# Patient Record
Sex: FEMALE | Race: WHITE | NOT HISPANIC OR LATINO | Employment: OTHER | URBAN - METROPOLITAN AREA
[De-identification: names, ages, dates, MRNs, and addresses within clinical notes are randomized per-mention and may not be internally consistent; named-entity substitution may affect disease eponyms.]

---

## 2018-08-12 PROBLEM — I10 HTN (HYPERTENSION): Status: ACTIVE | Noted: 2018-08-12

## 2018-08-12 PROBLEM — R55 SYNCOPE: Status: ACTIVE | Noted: 2018-08-12

## 2018-08-12 PROBLEM — E78.5 HYPERLIPIDEMIA: Status: ACTIVE | Noted: 2018-08-12

## 2019-04-05 ENCOUNTER — OFFICE VISIT (OUTPATIENT)
Dept: INTERNAL MEDICINE | Facility: CLINIC | Age: 84
End: 2019-04-05
Payer: MEDICARE

## 2019-04-05 VITALS
HEIGHT: 60 IN | RESPIRATION RATE: 16 BRPM | HEART RATE: 58 BPM | BODY MASS INDEX: 23.85 KG/M2 | SYSTOLIC BLOOD PRESSURE: 100 MMHG | DIASTOLIC BLOOD PRESSURE: 70 MMHG | WEIGHT: 121.5 LBS

## 2019-04-05 DIAGNOSIS — Z23 NEED FOR VACCINATION: ICD-10-CM

## 2019-04-05 DIAGNOSIS — F32.0 CURRENT MILD EPISODE OF MAJOR DEPRESSIVE DISORDER WITHOUT PRIOR EPISODE: ICD-10-CM

## 2019-04-05 DIAGNOSIS — E78.5 HYPERLIPIDEMIA, UNSPECIFIED HYPERLIPIDEMIA TYPE: ICD-10-CM

## 2019-04-05 DIAGNOSIS — I10 ESSENTIAL HYPERTENSION: Primary | ICD-10-CM

## 2019-04-05 DIAGNOSIS — F41.9 ANXIETY: ICD-10-CM

## 2019-04-05 DIAGNOSIS — I25.10 CORONARY ARTERY DISEASE INVOLVING NATIVE CORONARY ARTERY OF NATIVE HEART WITHOUT ANGINA PECTORIS: ICD-10-CM

## 2019-04-05 PROBLEM — R55 SYNCOPE: Status: RESOLVED | Noted: 2018-08-12 | Resolved: 2019-04-05

## 2019-04-05 PROCEDURE — 99213 OFFICE O/P EST LOW 20 MIN: CPT | Mod: PBBFAC | Performed by: INTERNAL MEDICINE

## 2019-04-05 PROCEDURE — 90714 TD VACC NO PRESV 7 YRS+ IM: CPT | Mod: PBBFAC

## 2019-04-05 PROCEDURE — 99999 TD VACCINE GREATER THAN OR EQUAL TO 7YO PRESERVATIVE FREE IM: CPT | Mod: PBBFAC,,,

## 2019-04-05 PROCEDURE — 99999 PR STA SHADOW: CPT | Mod: PBBFAC,,, | Performed by: INTERNAL MEDICINE

## 2019-04-05 PROCEDURE — 99999 PR PBB SHADOW E&M-EST. PATIENT-LVL III: ICD-10-PCS | Mod: PBBFAC,,, | Performed by: INTERNAL MEDICINE

## 2019-04-05 PROCEDURE — 99999 PNEUMOCOCCAL CONJUGATE VACCINE 13-VALENT LESS THAN 5YO & GREATER THAN: ICD-10-PCS | Mod: PBBFAC,,,

## 2019-04-05 PROCEDURE — G0009 ADMIN PNEUMOCOCCAL VACCINE: HCPCS | Mod: PBBFAC

## 2019-04-05 PROCEDURE — 99203 OFFICE O/P NEW LOW 30 MIN: CPT | Mod: S$PBB | Performed by: INTERNAL MEDICINE

## 2019-04-05 PROCEDURE — 99999 PNEUMOCOCCAL CONJUGATE VACCINE 13-VALENT LESS THAN 5YO & GREATER THAN: CPT | Mod: PBBFAC,,,

## 2019-04-05 PROCEDURE — 99999 PR PBB SHADOW E&M-EST. PATIENT-LVL III: CPT | Mod: PBBFAC,,, | Performed by: INTERNAL MEDICINE

## 2019-04-05 RX ORDER — FLUOXETINE 10 MG/1
10 CAPSULE ORAL DAILY
Qty: 30 CAPSULE | Refills: 11 | Status: SHIPPED | OUTPATIENT
Start: 2019-04-05 | End: 2019-05-06

## 2019-04-05 RX ORDER — LISINOPRIL AND HYDROCHLOROTHIAZIDE 10; 12.5 MG/1; MG/1
1 TABLET ORAL EVERY MORNING
Refills: 2 | COMMUNITY
Start: 2019-03-08 | End: 2023-05-16

## 2019-04-05 NOTE — PROGRESS NOTES
"`Subjective:       Patient ID: Alexandra Gleason is a 84 y.o. female.    Chief Complaint: Anxiety and Establish Care      HPI:  Patient is new to clinic and presents to establish care. She has CAD, HTN, HLD.     HTN: on lisinopril-HCTZ. On for years. Does not check BP At home. Denies chest pains, SOB, LE edema.     CAD: follows with Dr. Bhakta. No stents or h/o MI, found on angiogram. Denies angian sx. On statin. HR 58 today--not on BB.    HLD: on atorvastatin 20mg. Denies medication side effects. Does blood work with Dr. Bhakta every 6 months--due in August.     Today she reports issues with anxiety. She feels nervous. + crying spells " for now reason". Has had sx for several months, maybe years. Dealing with her sick  (dementia) and caring for him. Has never been on medicine for her mood. Does not sleep well, sleeps may 4 hours a night. Difficulty getting to sleep. Granddaughter in on Prozac and interested in trying this.       Past Medical History:   Diagnosis Date    High cholesterol     Hypertension        History reviewed. No pertinent family history.    Social History     Socioeconomic History    Marital status:      Spouse name: Not on file    Number of children: Not on file    Years of education: Not on file    Highest education level: Not on file   Occupational History    Not on file   Social Needs    Financial resource strain: Not on file    Food insecurity:     Worry: Not on file     Inability: Not on file    Transportation needs:     Medical: Not on file     Non-medical: Not on file   Tobacco Use    Smoking status: Never Smoker   Substance and Sexual Activity    Alcohol use: No    Drug use: No    Sexual activity: Not on file   Lifestyle    Physical activity:     Days per week: Not on file     Minutes per session: Not on file    Stress: Not on file   Relationships    Social connections:     Talks on phone: Not on file     Gets together: Not on file     Attends Christian service: " Not on file     Active member of club or organization: Not on file     Attends meetings of clubs or organizations: Not on file     Relationship status: Not on file   Other Topics Concern    Not on file   Social History Narrative    Not on file       Review of Systems   Constitutional: Negative for activity change, fatigue, fever and unexpected weight change.   HENT: Negative for congestion, ear pain, hearing loss, rhinorrhea and sore throat.    Eyes: Negative for redness and visual disturbance.   Respiratory: Negative for cough, shortness of breath and wheezing.    Cardiovascular: Negative for chest pain, palpitations and leg swelling.   Gastrointestinal: Negative for abdominal pain, constipation, diarrhea, nausea and vomiting.   Genitourinary: Negative for dysuria, frequency and urgency.   Musculoskeletal: Negative for back pain, joint swelling and neck pain.   Skin: Negative for color change, rash and wound.   Neurological: Negative for dizziness, tremors, weakness, light-headedness and headaches.   Psychiatric/Behavioral: Positive for dysphoric mood and sleep disturbance. The patient is nervous/anxious.          Objective:      Physical Exam   Constitutional: She is oriented to person, place, and time. She appears well-developed and well-nourished. No distress.   HENT:   Head: Normocephalic and atraumatic.   Right Ear: External ear normal.   Left Ear: External ear normal.   Eyes: Pupils are equal, round, and reactive to light. Conjunctivae and EOM are normal. Right eye exhibits no discharge. Left eye exhibits no discharge.   Neck: Neck supple. No tracheal deviation present.   Cardiovascular: Normal rate and regular rhythm.   No murmur heard.  Pulmonary/Chest: Effort normal and breath sounds normal. No respiratory distress. She has no wheezes. She has no rales.   Abdominal: Soft. Bowel sounds are normal. She exhibits no distension. There is no tenderness.   Neurological: She is alert and oriented to person,  place, and time. No cranial nerve deficit.   Skin: Skin is warm and dry.   Psychiatric: She has a normal mood and affect. Her behavior is normal.   Vitals reviewed.      Assessment:       1. Essential hypertension    2. Hyperlipidemia, unspecified hyperlipidemia type    3. Need for vaccination    4. Anxiety    5. Current mild episode of major depressive disorder without prior episode        Plan:       Alexandra was seen today for anxiety and establish care.    Diagnoses and all orders for this visit:    Essential hypertension  Chronic controlled  Continue medications at same dose  Low Na diet  Exercise, weight loss  Check BP and keep log for next visit    Hyperlipidemia, unspecified hyperlipidemia type  Chronic controlled  Cont meds same dose  Labs done with Dr. Bhakta  Will get records    Need for vaccination  -     (In Office Administered) Pneumococcal Conjugate Vaccine (13 Valent) (IM)  -     (In Office Administered) Td Vaccine - Preservative Free    Anxiety  -     FLUoxetine 10 MG capsule; Take 1 capsule (10 mg total) by mouth once daily.  New problem  Worsening over several months  Trial of Prozac (family member on this with good results)  Side effects of SSRI discussed today in detail including risk of SI. Voiced understanding  Understands may take 4-8 weeks for full effect    Current mild episode of major depressive disorder without prior episode  -     FLUoxetine 10 MG capsule; Take 1 capsule (10 mg total) by mouth once daily.  New problem  Worsening over several months  Trial of Prozac (family member on this with good results)  Side effects of SSRI discussed today in detail including risk of SI. Voiced understanding  Understands may take 4-8 weeks for full effect    Coronary artery disease involving native coronary artery of native heart without angina pectoris  Follows with Dr. Bhakta  Cont statin therapy  Not on BB--HR 58 today  Labs with Dr. Bhakta    Health Maintenance:  -CRC: over age for screening  -PAP/MMG:   Over age for screening  -Bone Density:  Discuss more next visit  -tobacco: does not smoke  -Vaccines  Flu- did not get 2018 vaccine  Zoster- update next visit  Pneumonia-  prevnar 13 done 4/2019; pneumovax due 4/2010  Tetanus- done 4/2019       RTC 4 weeks and PRN

## 2019-04-05 NOTE — PROGRESS NOTES
No record of TD or Pneumococcal 13 in LINKS. Patient denies receiving vaccines in the past. TD given right deltoid per order. Pneumococcal 13 given left deltoid per order. No reaction noted. Patient instructed to wait 15 minutes after injection administration. Patient verbalized understanding.

## 2019-04-16 ENCOUNTER — TELEPHONE (OUTPATIENT)
Dept: INTERNAL MEDICINE | Facility: CLINIC | Age: 84
End: 2019-04-16

## 2019-04-16 NOTE — TELEPHONE ENCOUNTER
It can be but also may not be. Can be from anxiousness. Stop prozac for 1 week then can try and restart

## 2019-04-16 NOTE — TELEPHONE ENCOUNTER
----- Message from Trisha Fitch sent at 2019 10:29 AM CDT -----  Contact: Ana (grand mother)  Alexandra Gleason  MRN: [id number]  : 1934  PCP: Carla Butler  Home Phone      [phone]  Work Phone      Not on file.  Mobile          [phone]    MESSAGE:   The pt has questions regarding her medication. - Prozac 10 mg 1 otero   Pt c/o shaky more than usual to body, weakness to both legs.     Phone # [phone]    [facility] [place] [address]

## 2019-04-16 NOTE — TELEPHONE ENCOUNTER
"Dr Butler pt. Pt was started on prozac 10mg on 4/5. Pt is c/o increased "shakiness" and weakened legs. Is this a side effect of prozac?  "

## 2019-04-22 ENCOUNTER — HOSPITAL ENCOUNTER (EMERGENCY)
Facility: HOSPITAL | Age: 84
Discharge: HOME | End: 2019-04-22
Attending: SURGERY
Payer: MEDICARE

## 2019-04-22 VITALS
TEMPERATURE: 98 F | HEIGHT: 60 IN | DIASTOLIC BLOOD PRESSURE: 64 MMHG | SYSTOLIC BLOOD PRESSURE: 134 MMHG | RESPIRATION RATE: 18 BRPM | HEART RATE: 61 BPM | OXYGEN SATURATION: 100 % | WEIGHT: 120 LBS | BODY MASS INDEX: 23.56 KG/M2

## 2019-04-22 DIAGNOSIS — M54.50 MIDLINE LOW BACK PAIN WITHOUT SCIATICA, UNSPECIFIED CHRONICITY: Primary | ICD-10-CM

## 2019-04-22 PROCEDURE — 96372 THER/PROPH/DIAG INJ SC/IM: CPT | Mod: 59

## 2019-04-22 PROCEDURE — 63600175 PHARM REV CODE 636 W HCPCS: Performed by: SURGERY

## 2019-04-22 PROCEDURE — 99284 EMERGENCY DEPT VISIT MOD MDM: CPT | Mod: 25

## 2019-04-22 RX ORDER — CYCLOBENZAPRINE HCL 10 MG
10 TABLET ORAL 3 TIMES DAILY PRN
Qty: 10 TABLET | Refills: 0 | Status: SHIPPED | OUTPATIENT
Start: 2019-04-22 | End: 2019-04-22 | Stop reason: SDUPTHER

## 2019-04-22 RX ORDER — HYDROCODONE BITARTRATE AND ACETAMINOPHEN 5; 325 MG/1; MG/1
1 TABLET ORAL EVERY 4 HOURS PRN
Qty: 10 TABLET | Refills: 0 | Status: SHIPPED | OUTPATIENT
Start: 2019-04-22 | End: 2019-04-22 | Stop reason: SDUPTHER

## 2019-04-22 RX ORDER — MORPHINE SULFATE 2 MG/ML
1 INJECTION, SOLUTION INTRAMUSCULAR; INTRAVENOUS
Status: COMPLETED | OUTPATIENT
Start: 2019-04-22 | End: 2019-04-22

## 2019-04-22 RX ORDER — ONDANSETRON 2 MG/ML
4 INJECTION INTRAMUSCULAR; INTRAVENOUS
Status: COMPLETED | OUTPATIENT
Start: 2019-04-22 | End: 2019-04-22

## 2019-04-22 RX ORDER — CYCLOBENZAPRINE HCL 10 MG
10 TABLET ORAL 3 TIMES DAILY PRN
Qty: 10 TABLET | Refills: 0 | Status: SHIPPED | OUTPATIENT
Start: 2019-04-22 | End: 2019-04-27

## 2019-04-22 RX ORDER — HYDROCODONE BITARTRATE AND ACETAMINOPHEN 5; 325 MG/1; MG/1
1 TABLET ORAL EVERY 4 HOURS PRN
Qty: 10 TABLET | Refills: 0 | Status: SHIPPED | OUTPATIENT
Start: 2019-04-22 | End: 2019-05-02

## 2019-04-22 RX ADMIN — ONDANSETRON 4 MG: 2 INJECTION INTRAMUSCULAR; INTRAVENOUS at 01:04

## 2019-04-22 RX ADMIN — MORPHINE SULFATE 1 MG: 2 INJECTION, SOLUTION INTRAMUSCULAR; INTRAVENOUS at 01:04

## 2019-04-22 NOTE — ED TRIAGE NOTES
Pt presents with C/O lower back pain and feeling a pop in her back when she tried to pick her  up off the floor about 1030 this morning

## 2019-04-22 NOTE — ED NOTES
The patient is awake, alert and cooperative with a calm affect, patient is aware of environment, family members at bedside. Normal respiratory effort and rate noted, no apparent distress noted, resting comfortably. No change from previous assessment. Bed in low, locked position, SR x1. Will continue to monitor.

## 2019-04-22 NOTE — ED PROVIDER NOTES
[facility]                                                 Chief Complaint  84 y.o. female with Back Pain (felt a pop in lower back)    History of Present Illness  Alexandra Gleason presents to the emergency room with low back pain  Patient has low back pain, was lifting up on her  yesterday  Patient felt a pop in her back after lifting up on her  at home  Patient states she has any pain with any walking, no leg weakness  Pt has no signs of cauda equina syndrome, denies previous trauma  Pt on exam has a normal lumbar spine without step-off or deformity    The history is provided by the patient   device was not used during this ER visit  Medical history: Hypertension and high cholesterol  Surgeries: Back surgery, cholecystectomy and hysterectomy, neck surgery  Allergies: Iodine    Review of Systems and Physical Exam      Review of Systems  -- Constitution - no fever, denies fatigue, no weakness, no chills  -- Eyes - no tearing or redness, no visual disturbance  -- Ear, Nose - no tinnitus or earache, no nasal congestion or discharge  -- Mouth,Throat - no sore throat, no toothache, normal voice, normal swallowing  -- Respiratory - denies cough and congestion, no shortness of breath, no CARRERO  -- Cardiovascular - denies chest pain, no palpitations, denies claudication  -- Gastrointestinal - denies abdominal pain, nausea, vomiting, or diarrhea  -- Genitourinary - no dysuria, no hematuria, no flank pain, no bladder pain  -- Musculoskeletal - back pain  -- Neurological - no headache, denies weakness or seizure; no LOC  -- Skin - denies pallor, rash, or changes in skin. no hives or welts noted    Vital Signs  Her tympanic temperature is 97.6 °F (36.4 °C).   Her blood pressure is 134/64 and her pulse is 61.   Her respiration is 18 and oxygen saturation is 100%.     Physical Exam  -- Nursing note and vitals reviewed  -- Constitutional: Appears well-developed and  well-nourished  -- Head: Atraumatic. Normocephalic. No obvious abnormality  -- Eyes: Pupils are equal and reactive to light. Normal conjunctiva and lids  -- Cardiac: Normal rate, regular rhythm and normal heart sounds  -- Pulmonary: Normal respiratory effort, breath sounds clear to auscultation  -- Abdominal: Soft, no tenderness. Normal bowel sounds. Normal liver edge  -- Musculoskeletal: Normal range of motion, no effusions. Joints stable   -- Neurological: No focal deficits. Showed good interaction with staff  -- Skin: Warm and dry. No evidence of rash or cellulitis    Emergency Room Course      Treatment and Evaluation  -- L-spine x-rays showed no evidence of acute fracture or dislocation   -- IM 1 mg Morphine given in the ER  -- IM 4 mg Zofran given today in the ER     Diagnosis  -- Midline low back pain without sciatica    Disposition and Plan  -- Disposition: home  -- Condition: stable  -- Follow-up: Patient to follow up with Carla Butler MD in 1-2 days.  -- I advised the patient that we have found no life threatening condition today  -- At this time, I believe the patient is clinically stable for discharge.   -- The patient acknowledges that close follow up with a MD is required   -- Patient agrees to comply with all instruction and direction given in the ER    This note is dictated on M*Modal word recognition program.  There are word recognition mistakes that are occasionally missed on review.          Ar Shrestha MD  04/22/19 0139

## 2019-04-29 ENCOUNTER — TELEPHONE (OUTPATIENT)
Dept: INTERNAL MEDICINE | Facility: CLINIC | Age: 84
End: 2019-04-29

## 2019-04-29 RX ORDER — CYCLOBENZAPRINE HCL 5 MG
5 TABLET ORAL 3 TIMES DAILY PRN
Qty: 30 TABLET | Refills: 0 | Status: SHIPPED | OUTPATIENT
Start: 2019-04-29 | End: 2019-05-09

## 2019-05-06 ENCOUNTER — OFFICE VISIT (OUTPATIENT)
Dept: INTERNAL MEDICINE | Facility: CLINIC | Age: 84
End: 2019-05-06
Payer: MEDICARE

## 2019-05-06 VITALS
BODY MASS INDEX: 23.46 KG/M2 | WEIGHT: 119.5 LBS | DIASTOLIC BLOOD PRESSURE: 86 MMHG | HEIGHT: 60 IN | SYSTOLIC BLOOD PRESSURE: 140 MMHG | HEART RATE: 72 BPM | RESPIRATION RATE: 16 BRPM

## 2019-05-06 DIAGNOSIS — I10 ESSENTIAL HYPERTENSION: ICD-10-CM

## 2019-05-06 DIAGNOSIS — M54.50 ACUTE RIGHT-SIDED LOW BACK PAIN WITHOUT SCIATICA: Primary | ICD-10-CM

## 2019-05-06 DIAGNOSIS — F41.9 ANXIETY: ICD-10-CM

## 2019-05-06 DIAGNOSIS — E78.5 HYPERLIPIDEMIA, UNSPECIFIED HYPERLIPIDEMIA TYPE: ICD-10-CM

## 2019-05-06 DIAGNOSIS — M81.0 AGE-RELATED OSTEOPOROSIS WITHOUT CURRENT PATHOLOGICAL FRACTURE: ICD-10-CM

## 2019-05-06 DIAGNOSIS — I25.10 CORONARY ARTERY DISEASE INVOLVING NATIVE CORONARY ARTERY OF NATIVE HEART WITHOUT ANGINA PECTORIS: ICD-10-CM

## 2019-05-06 DIAGNOSIS — M85.80 OSTEOPENIA, UNSPECIFIED LOCATION: ICD-10-CM

## 2019-05-06 PROCEDURE — 99999 PR STA SHADOW: ICD-10-PCS | Mod: PBBFAC,,, | Performed by: INTERNAL MEDICINE

## 2019-05-06 PROCEDURE — 99999 PR PBB SHADOW E&M-EST. PATIENT-LVL III: CPT | Mod: PBBFAC,,, | Performed by: INTERNAL MEDICINE

## 2019-05-06 PROCEDURE — 99214 OFFICE O/P EST MOD 30 MIN: CPT | Mod: S$PBB | Performed by: INTERNAL MEDICINE

## 2019-05-06 PROCEDURE — 99999 PR STA SHADOW: CPT | Mod: PBBFAC,,, | Performed by: INTERNAL MEDICINE

## 2019-05-06 PROCEDURE — 99213 OFFICE O/P EST LOW 20 MIN: CPT | Mod: PBBFAC | Performed by: INTERNAL MEDICINE

## 2019-05-06 NOTE — PROGRESS NOTES
"Subjective:       Patient ID: Alexandra Gleason is a 85 y.o. female.    Chief Complaint: Follow-up and Back Pain      HPI:  Patient is known to me and presents for follow up CAD, HTN, HLD and anxiety.      HTN: on lisinopril-HCTZ. On for years. Does not check BP at home. Denies chest pains, SOB, LE edema.      CAD: follows with Dr. Bhakta. No stents or h/o MI, found on angiogram. Denies angian sx. On statin.      HLD: on atorvastatin 20mg. Denies medication side effects. Does blood work with Dr. Bhakta every 6 months--due in August.      Anxiety. She feels nervous. + crying spells " for no reason". Has had sx for several months, maybe years. Dealing with her sick  (dementia) and caring for him. Has never been on medicine for her mood. We did a trial of Prozac last visit but she thinks the medications caused a panic attack. Family states she has been having panic attacks even prior to Prozac but she does not want to take anymore. Today she does not feel like she needs medication and will let me know if her sx get worse.     Back pain: she has lifted her elderly  and tried to catch him and prevent him from falling. She was in ER. On flexeril and tylenol. Her pain is worsening and she has lifted on her  several more times since her xray in ER--x ray was normal. She is having difficulty lifting her right leg and bending forward. Pain is right sided low back.       Past Medical History:   Diagnosis Date    High cholesterol     Hypertension        History reviewed. No pertinent family history.    Social History     Socioeconomic History    Marital status:      Spouse name: Not on file    Number of children: Not on file    Years of education: Not on file    Highest education level: Not on file   Occupational History    Not on file   Social Needs    Financial resource strain: Not on file    Food insecurity:     Worry: Not on file     Inability: Not on file    Transportation needs:     " Medical: Not on file     Non-medical: Not on file   Tobacco Use    Smoking status: Never Smoker   Substance and Sexual Activity    Alcohol use: No    Drug use: No    Sexual activity: Not on file   Lifestyle    Physical activity:     Days per week: Not on file     Minutes per session: Not on file    Stress: Not on file   Relationships    Social connections:     Talks on phone: Not on file     Gets together: Not on file     Attends Jehovah's witness service: Not on file     Active member of club or organization: Not on file     Attends meetings of clubs or organizations: Not on file     Relationship status: Not on file   Other Topics Concern    Not on file   Social History Narrative    Not on file       Review of Systems   Constitutional: Negative for activity change, fatigue, fever and unexpected weight change.   HENT: Negative for congestion, ear pain, hearing loss, rhinorrhea and sore throat.    Eyes: Negative for pain, redness and visual disturbance.   Respiratory: Negative for cough, shortness of breath and wheezing.    Cardiovascular: Negative for chest pain, palpitations and leg swelling.   Gastrointestinal: Negative for abdominal pain, constipation, diarrhea, nausea and vomiting.   Genitourinary: Negative for dysuria, frequency, pelvic pain and urgency.   Musculoskeletal: Positive for back pain (b/l but worse right sided). Negative for joint swelling and neck pain.   Skin: Negative for color change, rash and wound.   Neurological: Negative for dizziness, tremors, weakness, light-headedness and headaches.         Objective:      Physical Exam   Constitutional: She is oriented to person, place, and time. She appears well-developed and well-nourished. No distress.   HENT:   Head: Normocephalic and atraumatic.   Right Ear: External ear normal.   Left Ear: External ear normal.   Eyes: Pupils are equal, round, and reactive to light. Conjunctivae and EOM are normal. Right eye exhibits no discharge. Left eye exhibits  no discharge.   Neck: Neck supple. No tracheal deviation present.   Cardiovascular: Normal rate and regular rhythm.   No murmur heard.  Pulmonary/Chest: Effort normal and breath sounds normal. No respiratory distress. She has no wheezes.   Abdominal: Soft. Bowel sounds are normal. She exhibits no distension. There is no tenderness.   Musculoskeletal: She exhibits tenderness (vertebral point tenderness around L4).   Neurological: She is alert and oriented to person, place, and time. No cranial nerve deficit.   Skin: Skin is warm and dry.   Psychiatric: She has a normal mood and affect. Her behavior is normal.   Vitals reviewed.      Assessment:       1. Acute right-sided low back pain without sciatica    2. Anxiety    3. Coronary artery disease involving native coronary artery of native heart without angina pectoris    4. Essential hypertension    5. Hyperlipidemia, unspecified hyperlipidemia type    6. Osteopenia, unspecified location    7. Age-related osteoporosis without current pathological fracture         Plan:       Alexandra was seen today for follow-up and back pain.    Diagnoses and all orders for this visit:    Acute right-sided low back pain without sciatica  -     CT Lumbar Spine Without Contrast; Future  S/p lifting her elerly   Some point tenderness around L4  Osteopenia on imaging  Concern for compression fracture  Cont tyelnol and flexeril pending CT results    Anxiety  Chronic stable  Denies need for SSRI  Did not like Prozac    Coronary artery disease involving native coronary artery of native heart without angina pectoris  Chronic controlled  Cont meds same dose    Essential hypertension  Chronic controlled  Continue medications at same dose  Low Na diet  Exercise, weight loss  Check BP and keep log for next visit    Hyperlipidemia, unspecified hyperlipidemia type  Chronic controlled  Cont meds same dose    Osteopenia, unspecified location  -     DXA Bone Density Spine And Hip;  Future    Age-related osteoporosis without current pathological fracture   -     DXA Bone Density Spine And Hip; Future         Health Maintenance:  -CRC: over age for screening  -PAP/MMG:  Over age for screening  -Bone Density: orderedvisit  -tobacco: does not smoke  -Vaccines  Flu- did not get 2018 vaccine  Zoster- update next visit  Pneumonia-  prevnar 13 done 4/2019; pneumovax due 4/2010  Tetanus- done 4/2019    RTC as scheudled and PRN pending CT results

## 2019-05-09 ENCOUNTER — HOSPITAL ENCOUNTER (OUTPATIENT)
Dept: RADIOLOGY | Facility: HOSPITAL | Age: 84
Discharge: HOME | End: 2019-05-09
Attending: INTERNAL MEDICINE
Payer: MEDICARE

## 2019-05-09 DIAGNOSIS — M85.80 OSTEOPENIA, UNSPECIFIED LOCATION: ICD-10-CM

## 2019-05-09 DIAGNOSIS — M81.0 AGE-RELATED OSTEOPOROSIS WITHOUT CURRENT PATHOLOGICAL FRACTURE: ICD-10-CM

## 2019-05-09 DIAGNOSIS — M54.50 ACUTE RIGHT-SIDED LOW BACK PAIN WITHOUT SCIATICA: ICD-10-CM

## 2019-05-09 PROCEDURE — 72131 CT LUMBAR SPINE W/O DYE: CPT | Mod: TC

## 2019-05-09 PROCEDURE — 77080 DXA BONE DENSITY AXIAL: CPT | Mod: TC

## 2019-05-14 ENCOUNTER — PATIENT MESSAGE (OUTPATIENT)
Dept: INTERNAL MEDICINE | Facility: CLINIC | Age: 84
End: 2019-05-14

## 2019-05-14 RX ORDER — ALENDRONATE SODIUM 70 MG/1
70 TABLET ORAL
Qty: 4 TABLET | Refills: 11 | Status: SHIPPED | OUTPATIENT
Start: 2019-05-14 | End: 2019-12-06 | Stop reason: SDUPTHER

## 2019-10-22 ENCOUNTER — PATIENT OUTREACH (OUTPATIENT)
Dept: ADMINISTRATIVE | Facility: HOSPITAL | Age: 84
End: 2019-10-22

## 2019-11-05 ENCOUNTER — OFFICE VISIT (OUTPATIENT)
Dept: INTERNAL MEDICINE | Facility: CLINIC | Age: 84
End: 2019-11-05
Payer: MEDICARE

## 2019-11-05 VITALS
DIASTOLIC BLOOD PRESSURE: 72 MMHG | OXYGEN SATURATION: 99 % | HEIGHT: 60 IN | RESPIRATION RATE: 18 BRPM | WEIGHT: 118.63 LBS | SYSTOLIC BLOOD PRESSURE: 128 MMHG | HEART RATE: 56 BPM | BODY MASS INDEX: 23.29 KG/M2

## 2019-11-05 DIAGNOSIS — I25.10 CORONARY ARTERY DISEASE INVOLVING NATIVE CORONARY ARTERY OF NATIVE HEART WITHOUT ANGINA PECTORIS: ICD-10-CM

## 2019-11-05 DIAGNOSIS — E78.5 HYPERLIPIDEMIA, UNSPECIFIED HYPERLIPIDEMIA TYPE: ICD-10-CM

## 2019-11-05 DIAGNOSIS — F41.9 ANXIETY: ICD-10-CM

## 2019-11-05 DIAGNOSIS — I10 ESSENTIAL HYPERTENSION: Primary | ICD-10-CM

## 2019-11-05 DIAGNOSIS — R25.1 TREMOR: ICD-10-CM

## 2019-11-05 DIAGNOSIS — M81.0 AGE-RELATED OSTEOPOROSIS WITHOUT CURRENT PATHOLOGICAL FRACTURE: ICD-10-CM

## 2019-11-05 PROCEDURE — 99214 OFFICE O/P EST MOD 30 MIN: CPT | Mod: S$PBB | Performed by: INTERNAL MEDICINE

## 2019-11-05 PROCEDURE — 99999 PR STA SHADOW: CPT | Mod: PBBFAC,,, | Performed by: INTERNAL MEDICINE

## 2019-11-05 PROCEDURE — 99999 PR PBB SHADOW E&M-EST. PATIENT-LVL IV: CPT | Mod: PBBFAC,,, | Performed by: INTERNAL MEDICINE

## 2019-11-05 PROCEDURE — 99999 FLU VACCINE - HIGH DOSE (65+) PRESERVATIVE FREE IM: CPT | Mod: PBBFAC,,,

## 2019-11-05 PROCEDURE — 99999 FLU VACCINE - HIGH DOSE (65+) PRESERVATIVE FREE IM: ICD-10-PCS | Mod: PBBFAC,,,

## 2019-11-05 PROCEDURE — 99999 PR STA SHADOW: ICD-10-PCS | Mod: PBBFAC,,, | Performed by: INTERNAL MEDICINE

## 2019-11-05 PROCEDURE — 99214 OFFICE O/P EST MOD 30 MIN: CPT | Mod: PBBFAC,25 | Performed by: INTERNAL MEDICINE

## 2019-11-05 PROCEDURE — 90662 IIV NO PRSV INCREASED AG IM: CPT | Mod: PBBFAC

## 2019-11-05 NOTE — PROGRESS NOTES
"Subjective:       Patient ID: Alexandra Gleason is a 85 y.o. female.    Chief Complaint: Follow-up (6 mo)      HPI:  Patient is known to me and presents for follow up CAD, HTN, HLD and anxiety. No new labs prior to today visit. Will do labs with [facility] soon and sen me results.     She is still caring for her  with dementia. He is bedridden. On [facility]. She is caring for him. She reports worsening tremor of b/l hands. She has had "for years" but difficult caring for  now. She can't write her name. She can't feed him (or herself) without spilling food. Denies difficulty walking or feeling like she "gets stuck" when she walks.     HTN: on lisinopril-HCTZ. On for years. Does not check BP at home. Denies chest pains, SOB, LE edema.      CAD: follows with Dr. Bhakta. No stents or h/o MI, found on angiogram. Denies angian sx. On statin. Tells me will be having stress test 12/2019.      HLD: on atorvastatin 20mg. Denies medication side effects. Does blood work with Dr. Bhakta every 6 months--due in August.      Anxiety. She feels nervous. + crying spells " for no reason". Has had sx for several months, maybe years. Dealing with her sick  (dementia) and caring for him. Has never been on medicine for her mood. We did a trial of Prozac last visit but she thinks the medications caused a panic attack. Family states she has been having panic attacks even prior to Prozac but she does not want to take anymore. Today she does not feel like she needs medication and will let me know if her sx get worse.      Osteoporosis: DEXA 5/2019. On fosamax, Ca and Vit D.     Back pain: chronic. CT shows chronic degenerative changes from 5/2019. She is lifting on her elderly  which is not helping. OTC tyelnol and Ibuprofen not helpful. Discussed PT nad pain management but she is not interested right now "I don't have time for that. I can't leave him alone"       Past Medical History:   Diagnosis Date    High " cholesterol     Hypertension        History reviewed. No pertinent family history.    Social History     Socioeconomic History    Marital status:      Spouse name: Not on file    Number of children: Not on file    Years of education: Not on file    Highest education level: Not on file   Occupational History    Not on file   Social Needs    Financial resource strain: Not on file    Food insecurity:     Worry: Not on file     Inability: Not on file    Transportation needs:     Medical: Not on file     Non-medical: Not on file   Tobacco Use    Smoking status: Never Smoker   Substance and Sexual Activity    Alcohol use: No    Drug use: No    Sexual activity: Not on file   Lifestyle    Physical activity:     Days per week: Not on file     Minutes per session: Not on file    Stress: Not on file   Relationships    Social connections:     Talks on phone: Not on file     Gets together: Not on file     Attends [employer] service: Not on file     Active member of club or organization: Not on file     Attends meetings of clubs or organizations: Not on file     Relationship status: Not on file   Other Topics Concern    Not on file   Social History Narrative    Not on file       Review of Systems   Constitutional: Negative for activity change, fatigue, fever and unexpected weight change.   HENT: Negative for congestion, ear pain, hearing loss, rhinorrhea and sore throat.    Eyes: Negative for pain, redness and visual disturbance.   Respiratory: Negative for cough, shortness of breath and wheezing.    Cardiovascular: Negative for chest pain, palpitations and leg swelling.   Gastrointestinal: Negative for abdominal pain, constipation, diarrhea, nausea and vomiting.   Genitourinary: Negative for dysuria, frequency, pelvic pain and urgency.   Musculoskeletal: Positive for back pain. Negative for joint swelling and neck pain.   Skin: Negative for color change, rash and wound.   Neurological: Positive for  tremors. Negative for dizziness, weakness, light-headedness and headaches.         Objective:      Physical Exam   Constitutional: She is oriented to person, place, and time. She appears well-developed and well-nourished. No distress.   HENT:   Head: Normocephalic and atraumatic.   Right Ear: External ear normal.   Left Ear: External ear normal.   Eyes: Pupils are equal, round, and reactive to light. Conjunctivae and EOM are normal. Right eye exhibits no discharge. Left eye exhibits no discharge.   Neck: Neck supple. No tracheal deviation present.   Cardiovascular: Normal rate and regular rhythm.   No murmur heard.  Pulmonary/Chest: Effort normal and breath sounds normal. No respiratory distress. She has no wheezes. She has no rales.   Abdominal: Soft. Bowel sounds are normal. She exhibits no distension. There is no tenderness.   Neurological: She is alert and oriented to person, place, and time. No cranial nerve deficit.   + tremor b/l hands with finger to nose testing  ? cogwheeling b/l but no rigidity appreciated  Gait normal   Skin: Skin is warm and dry.   Psychiatric: She has a normal mood and affect. Her behavior is normal.   Vitals reviewed.      Assessment:       1. Essential hypertension    2. Hyperlipidemia, unspecified hyperlipidemia type    3. Coronary artery disease involving native coronary artery of native heart without angina pectoris    4. Anxiety    5. Age-related osteoporosis without current pathological fracture    6. Tremor        Plan:       Alexandra was seen today for follow-up.    Diagnoses and all orders for this visit:    Essential hypertension  -     CBC auto differential; Future  -     Comprehensive metabolic panel; Future  -     TSH; Future  -     Lipid panel; Future  -     T4, free; Future  -     Vitamin D; Future  Chronic stable  Continue medications at same dose  Low Na diet  Exercise, weight loss  Check BP and keep log for next visit    Hyperlipidemia, unspecified hyperlipidemia type  -  "    CBC auto differential; Future  -     Comprehensive metabolic panel; Future  -     TSH; Future  -     Lipid panel; Future  -     T4, free; Future  -     Vitamin D; Future  Chronic controlled  Cont statin same dose pending labs    Coronary artery disease involving native coronary artery of native heart without angina pectoris  -     CBC auto differential; Future  -     Comprehensive metabolic panel; Future  -     TSH; Future  -     Lipid panel; Future  -     T4, free; Future  -     Vitamin D; Future  Chronic stable  Denies angina sx  Sees [facility]; tells me planning repeat stress test soon    Anxiety  -     CBC auto differential; Future  -     Comprehensive metabolic panel; Future  -     TSH; Future  -     Lipid panel; Future  -     T4, free; Future  -     Vitamin D; Future  Chronic controlled  Not needs meds right now  Offered support     Age-related osteoporosis without current pathological fracture  -     CBC auto differential; Future  -     Comprehensive metabolic panel; Future  -     TSH; Future  -     Lipid panel; Future  -     T4, free; Future  -     Vitamin D; Future  Chronic controlled  Cont fosamax, Ca and Vit D    Tremor  -     Ambulatory Referral to Neurology  New problem  Has had "for years" but worsening and just mentioning to me today  I don't appreciate any resting tremor but I think there is some cogwheeling on exam. Otherwise I don't see sx or PD  I think this is intention tremor  No BB due to HR 50s  Consider mirapex  She will see Dr. Medina to discuss diagnosis and further treatment. She was hesitant for appointment but encouraged her to keep to discuss since it is now affecting her daily life and ability to care for her , Mr. Hewitt       Health Maintenance:  -CRC: over age for screening  -PAP/MMG:  Over age for screening  -Bone Density: orderedvisit  -tobacco: does not smoke  -Vaccines  Flu- 11/2019  Zoster- discussed  Pneumonia-  prevnar 13 done 4/2019; pneumovax due 4/2010  Tetanus- " done 4/2019    RTC 6 months and PRN

## 2019-12-06 RX ORDER — ALENDRONATE SODIUM 70 MG/1
70 TABLET ORAL
Qty: 12 TABLET | Refills: 3 | Status: SHIPPED | OUTPATIENT
Start: 2019-12-06 | End: 2020-09-28

## 2020-05-26 ENCOUNTER — OFFICE VISIT (OUTPATIENT)
Dept: INTERNAL MEDICINE | Facility: CLINIC | Age: 85
End: 2020-05-26
Payer: MEDICARE

## 2020-05-26 VITALS
OXYGEN SATURATION: 97 % | WEIGHT: 119.25 LBS | RESPIRATION RATE: 17 BRPM | TEMPERATURE: 98 F | SYSTOLIC BLOOD PRESSURE: 142 MMHG | BODY MASS INDEX: 23.41 KG/M2 | DIASTOLIC BLOOD PRESSURE: 80 MMHG | HEIGHT: 60 IN | HEART RATE: 65 BPM

## 2020-05-26 DIAGNOSIS — I25.10 CORONARY ARTERY DISEASE INVOLVING NATIVE CORONARY ARTERY OF NATIVE HEART WITHOUT ANGINA PECTORIS: Primary | ICD-10-CM

## 2020-05-26 DIAGNOSIS — E78.5 HYPERLIPIDEMIA, UNSPECIFIED HYPERLIPIDEMIA TYPE: ICD-10-CM

## 2020-05-26 DIAGNOSIS — I10 ESSENTIAL HYPERTENSION: ICD-10-CM

## 2020-05-26 PROCEDURE — 99999 PR PBB SHADOW E&M-EST. PATIENT-LVL IV: ICD-10-PCS | Mod: PBBFAC,,, | Performed by: INTERNAL MEDICINE

## 2020-05-26 PROCEDURE — 99999 PR PBB SHADOW E&M-EST. PATIENT-LVL IV: CPT | Mod: PBBFAC,,, | Performed by: INTERNAL MEDICINE

## 2020-05-26 PROCEDURE — 99999 PR STA SHADOW: CPT | Mod: PBBFAC,,, | Performed by: INTERNAL MEDICINE

## 2020-05-26 PROCEDURE — 99214 OFFICE O/P EST MOD 30 MIN: CPT | Mod: S$PBB | Performed by: INTERNAL MEDICINE

## 2020-05-26 PROCEDURE — 99214 OFFICE O/P EST MOD 30 MIN: CPT | Mod: PBBFAC | Performed by: INTERNAL MEDICINE

## 2020-05-26 NOTE — PROGRESS NOTES
"Subjective:       Patient ID: Alexandra Gleason is a 86 y.o. female.    Chief Complaint: Follow-up (6 mo)      HPI:  Patient is known to me and presents for follow up CAD, HTN, HLD and anxiety. No new labs prior to today visit. Done with [facility] 12/2019, will review     Mr. Hewitt () passed away recently from advanced dementia. On [facility]. Her mood if "good". She misses him but she is much less "stressed" with not having to care for him everyday.      HTN: on lisinopril-HCTZ. On for years. Does not check BP at home. Denies chest pains, SOB, LE edema. A bit elevated today.     CAD: follows with Dr. Bhakta. No stents or h/o MI, found on angiogram. Denies angian sx. On statin. Tells me had stress test 12/2019.      HLD: on atorvastatin 20mg. Denies medication side effects. Does blood work with Dr. Bhakta every 6 months--done 12/2019, will review       Osteoporosis: DEXA 5/2019. On fosamax, Ca and Vit D.      Back pain: chronic. CT shows chronic degenerative changes from 5/2019. She is feeling better as she is not lifting her  everyday. No complaints today.     Past Medical History:   Diagnosis Date    High cholesterol     Hypertension        History reviewed. No pertinent family history.    Social History     Socioeconomic History    Marital status:      Spouse name: Not on file    Number of children: Not on file    Years of education: Not on file    Highest education level: Not on file   Occupational History    Not on file   Social Needs    Financial resource strain: Not on file    Food insecurity:     Worry: Not on file     Inability: Not on file    Transportation needs:     Medical: Not on file     Non-medical: Not on file   Tobacco Use    Smoking status: Never Smoker   Substance and Sexual Activity    Alcohol use: No    Drug use: No    Sexual activity: Not on file   Lifestyle    Physical activity:     Days per week: Not on file     Minutes per session: Not on file    Stress: " Not on file   Relationships    Social connections:     Talks on phone: Not on file     Gets together: Not on file     Attends [employer] service: Not on file     Active member of club or organization: Not on file     Attends meetings of clubs or organizations: Not on file     Relationship status: Not on file   Other Topics Concern    Not on file   Social History Narrative    Not on file       Review of Systems      Objective:      Physical Exam    Assessment:       1. Coronary artery disease involving native coronary artery of native heart without angina pectoris    2. Essential hypertension    3. Hyperlipidemia, unspecified hyperlipidemia type        Plan:       Alexandra was seen today for follow-up.    Diagnoses and all orders for this visit:    Coronary artery disease involving native coronary artery of native heart without angina pectoris  -     CBC auto differential; Future  -     Comprehensive metabolic panel; Future  -     TSH; Future  -     Lipid Panel; Future  Chronic stable  Follows with Dr. Bhakta  Cont meds same dose    Essential hypertension  -     CBC auto differential; Future  -     Comprehensive metabolic panel; Future  -     TSH; Future  -     Lipid Panel; Future  Chronic, borderline control  For her age this is not bad control  Continue medications at same dose  Low Na diet  Exercise, weight loss  Check BP and keep log for next visit    Hyperlipidemia, unspecified hyperlipidemia type  -     CBC auto differential; Future  -     Comprehensive metabolic panel; Future  -     TSH; Future  -     Lipid Panel; Future  Chronic controlled  Cont statin same dose       Health Maintenance:  -CRC: over age for screening  -PAP/MMG:  Over age for screening  -Bone Density: ordered  -tobacco: does not smoke  -Vaccines  Flu- 11/2019  Zoster- discussed  Pneumonia-  prevnar 13 done 4/2019; pneumovax due 4/2010  Tetanus- done 4/2019    RTC 6 months with labs and PRN. If BP trending up again adjust meds

## 2020-07-17 DIAGNOSIS — Z71.89 COMPLEX CARE COORDINATION: ICD-10-CM

## 2020-10-01 ENCOUNTER — PATIENT MESSAGE (OUTPATIENT)
Dept: OTHER | Facility: OTHER | Age: 85
End: 2020-10-01

## 2020-11-11 ENCOUNTER — LAB VISIT (OUTPATIENT)
Dept: LAB | Facility: HOSPITAL | Age: 85
End: 2020-11-11
Attending: INTERNAL MEDICINE
Payer: MEDICARE

## 2020-11-11 DIAGNOSIS — I25.10 CORONARY ARTERY DISEASE INVOLVING NATIVE CORONARY ARTERY OF NATIVE HEART WITHOUT ANGINA PECTORIS: ICD-10-CM

## 2020-11-11 DIAGNOSIS — I10 ESSENTIAL HYPERTENSION: ICD-10-CM

## 2020-11-11 DIAGNOSIS — E78.5 HYPERLIPIDEMIA, UNSPECIFIED HYPERLIPIDEMIA TYPE: ICD-10-CM

## 2020-11-11 DIAGNOSIS — F41.9 ANXIETY: ICD-10-CM

## 2020-11-11 DIAGNOSIS — M81.0 AGE-RELATED OSTEOPOROSIS WITHOUT CURRENT PATHOLOGICAL FRACTURE: ICD-10-CM

## 2020-11-11 LAB
25(OH)D3+25(OH)D2 SERPL-MCNC: 26 NG/ML (ref 30–96)
ALBUMIN SERPL BCP-MCNC: 4.2 G/DL (ref 3.5–5.2)
ALP SERPL-CCNC: 48 U/L (ref 55–135)
ALT SERPL W/O P-5'-P-CCNC: 19 U/L (ref 10–44)
ANION GAP SERPL CALC-SCNC: 10 MMOL/L (ref 8–16)
AST SERPL-CCNC: 21 U/L (ref 10–40)
BASOPHILS # BLD AUTO: 0.09 K/UL (ref 0–0.2)
BASOPHILS NFR BLD: 1.2 % (ref 0–1.9)
BILIRUB SERPL-MCNC: 0.6 MG/DL (ref 0.1–1)
BUN SERPL-MCNC: 15 MG/DL (ref 8–23)
CALCIUM SERPL-MCNC: 10 MG/DL (ref 8.7–10.5)
CHLORIDE SERPL-SCNC: 105 MMOL/L (ref 95–110)
CHOLEST SERPL-MCNC: 151 MG/DL (ref 120–199)
CHOLEST/HDLC SERPL: 2.5 {RATIO} (ref 2–5)
CO2 SERPL-SCNC: 26 MMOL/L (ref 23–29)
CREAT SERPL-MCNC: 1 MG/DL (ref 0.5–1.4)
DIFFERENTIAL METHOD: NORMAL
EOSINOPHIL # BLD AUTO: 0.3 K/UL (ref 0–0.5)
EOSINOPHIL NFR BLD: 3.7 % (ref 0–8)
ERYTHROCYTE [DISTWIDTH] IN BLOOD BY AUTOMATED COUNT: 13 % (ref 11.5–14.5)
EST. GFR  (AFRICAN AMERICAN): 59 ML/MIN/1.73 M^2
EST. GFR  (NON AFRICAN AMERICAN): 51 ML/MIN/1.73 M^2
GLUCOSE SERPL-MCNC: 97 MG/DL (ref 70–110)
HCT VFR BLD AUTO: 42.2 % (ref 37–48.5)
HDLC SERPL-MCNC: 61 MG/DL (ref 40–75)
HDLC SERPL: 40.4 % (ref 20–50)
HGB BLD-MCNC: 13.7 G/DL (ref 12–16)
IMM GRANULOCYTES # BLD AUTO: 0.02 K/UL (ref 0–0.04)
IMM GRANULOCYTES NFR BLD AUTO: 0.3 % (ref 0–0.5)
LDLC SERPL CALC-MCNC: 74 MG/DL (ref 63–159)
LYMPHOCYTES # BLD AUTO: 2 K/UL (ref 1–4.8)
LYMPHOCYTES NFR BLD: 26.9 % (ref 18–48)
MCH RBC QN AUTO: 29.3 PG (ref 27–31)
MCHC RBC AUTO-ENTMCNC: 32.5 G/DL (ref 32–36)
MCV RBC AUTO: 90 FL (ref 82–98)
MONOCYTES # BLD AUTO: 0.6 K/UL (ref 0.3–1)
MONOCYTES NFR BLD: 8.1 % (ref 4–15)
NEUTROPHILS # BLD AUTO: 4.4 K/UL (ref 1.8–7.7)
NEUTROPHILS NFR BLD: 59.8 % (ref 38–73)
NONHDLC SERPL-MCNC: 90 MG/DL
NRBC BLD-RTO: 0 /100 WBC
PLATELET # BLD AUTO: 275 K/UL (ref 150–350)
PMV BLD AUTO: 10.4 FL (ref 9.2–12.9)
POTASSIUM SERPL-SCNC: 5.1 MMOL/L (ref 3.5–5.1)
PROT SERPL-MCNC: 7 G/DL (ref 6–8.4)
RBC # BLD AUTO: 4.68 M/UL (ref 4–5.4)
SODIUM SERPL-SCNC: 141 MMOL/L (ref 136–145)
TRIGL SERPL-MCNC: 80 MG/DL (ref 30–150)
TSH SERPL DL<=0.005 MIU/L-ACNC: 2.47 UIU/ML (ref 0.4–4)
WBC # BLD AUTO: 7.37 K/UL (ref 3.9–12.7)

## 2020-11-11 PROCEDURE — 80061 LIPID PANEL: CPT

## 2020-11-11 PROCEDURE — 80053 COMPREHEN METABOLIC PANEL: CPT

## 2020-11-11 PROCEDURE — 84443 ASSAY THYROID STIM HORMONE: CPT

## 2020-11-11 PROCEDURE — 82306 VITAMIN D 25 HYDROXY: CPT

## 2020-11-11 PROCEDURE — 36415 COLL VENOUS BLD VENIPUNCTURE: CPT

## 2020-11-11 PROCEDURE — 85025 COMPLETE CBC W/AUTO DIFF WBC: CPT

## 2020-11-18 ENCOUNTER — OFFICE VISIT (OUTPATIENT)
Dept: INTERNAL MEDICINE | Facility: CLINIC | Age: 85
End: 2020-11-18
Payer: MEDICARE

## 2020-11-18 VITALS
DIASTOLIC BLOOD PRESSURE: 64 MMHG | TEMPERATURE: 96 F | BODY MASS INDEX: 23.37 KG/M2 | SYSTOLIC BLOOD PRESSURE: 124 MMHG | HEIGHT: 60 IN | HEART RATE: 63 BPM | RESPIRATION RATE: 16 BRPM | WEIGHT: 119.06 LBS | OXYGEN SATURATION: 99 %

## 2020-11-18 DIAGNOSIS — E78.5 HYPERLIPIDEMIA, UNSPECIFIED HYPERLIPIDEMIA TYPE: ICD-10-CM

## 2020-11-18 DIAGNOSIS — G47.00 INSOMNIA, UNSPECIFIED TYPE: ICD-10-CM

## 2020-11-18 DIAGNOSIS — I25.10 CORONARY ARTERY DISEASE INVOLVING NATIVE CORONARY ARTERY OF NATIVE HEART WITHOUT ANGINA PECTORIS: ICD-10-CM

## 2020-11-18 DIAGNOSIS — I10 ESSENTIAL HYPERTENSION: Primary | ICD-10-CM

## 2020-11-18 DIAGNOSIS — Z23 NEED FOR VACCINATION: ICD-10-CM

## 2020-11-18 DIAGNOSIS — M81.0 AGE-RELATED OSTEOPOROSIS WITHOUT CURRENT PATHOLOGICAL FRACTURE: ICD-10-CM

## 2020-11-18 PROCEDURE — 90694 VACC AIIV4 NO PRSRV 0.5ML IM: CPT | Mod: PBBFAC

## 2020-11-18 PROCEDURE — 99999 PNEUMOCOCCAL POLYSACCHARIDE VACCINE 23-VALENT =>2YO SQ IM: CPT | Mod: PBBFAC,,,

## 2020-11-18 PROCEDURE — 99999 PNEUMOCOCCAL POLYSACCHARIDE VACCINE 23-VALENT =>2YO SQ IM: ICD-10-PCS | Mod: PBBFAC,,,

## 2020-11-18 PROCEDURE — G0009 ADMIN PNEUMOCOCCAL VACCINE: HCPCS | Mod: PBBFAC

## 2020-11-18 PROCEDURE — 99999 FLU VACCINE - QUADRIVALENT - ADJUVANTED: CPT | Mod: PBBFAC,,,

## 2020-11-18 PROCEDURE — 99999 PR STA SHADOW: CPT | Mod: PBBFAC,,, | Performed by: INTERNAL MEDICINE

## 2020-11-18 PROCEDURE — 99214 OFFICE O/P EST MOD 30 MIN: CPT | Mod: PBBFAC | Performed by: INTERNAL MEDICINE

## 2020-11-18 PROCEDURE — 99999 PR PBB SHADOW E&M-EST. PATIENT-LVL IV: ICD-10-PCS | Mod: PBBFAC,,, | Performed by: INTERNAL MEDICINE

## 2020-11-18 PROCEDURE — G0008 ADMIN INFLUENZA VIRUS VAC: HCPCS | Mod: PBBFAC

## 2020-11-18 PROCEDURE — 99999 PR PBB SHADOW E&M-EST. PATIENT-LVL IV: CPT | Mod: PBBFAC,,, | Performed by: INTERNAL MEDICINE

## 2020-11-18 PROCEDURE — 99214 OFFICE O/P EST MOD 30 MIN: CPT | Mod: S$PBB | Performed by: INTERNAL MEDICINE

## 2020-11-18 RX ORDER — VIT C/E/ZN/COPPR/LUTEIN/ZEAXAN 250MG-90MG
1000 CAPSULE ORAL DAILY
Qty: 90 CAPSULE | Refills: 3 | Status: SHIPPED | OUTPATIENT
Start: 2020-11-18 | End: 2021-02-03 | Stop reason: SDUPTHER

## 2020-11-18 RX ORDER — TRAZODONE HYDROCHLORIDE 50 MG/1
50 TABLET ORAL NIGHTLY
Qty: 90 TABLET | Refills: 3 | Status: SHIPPED | OUTPATIENT
Start: 2020-11-18 | End: 2023-05-16

## 2020-11-18 NOTE — PROGRESS NOTES
"Subjective:       Patient ID: Alexandra Gleason is a 86 y.o. female.    Chief Complaint: Follow-up (6 mo)      HPI:  Patient is known to me and presents for follow up CAD, HTN, HLD and anxiety. Labs from 11/11/2020 personally reviewed and discussed with the patient today.      Mr. Hewitt () passed away recently from advanced dementia. On [facility]. Her mood if "good". She misses him but she is much less "stressed" with not having to care for him everyday.   She is not sleeping well however, she is falling asleep but not staying asleep longer than 2-4 hours. She doesn't feel very tired during the day and not taking daytime naps but in bed by 7pm. Melatonin not helpful.     HTN: on lisinopril-HCTZ. On for years. Does not check BP at home. Denies chest pains, SOB, LE edema. A bit elevated today.     CAD: follows with Dr. Bhakta. No stents or h/o MI, found on angiogram. Denies angian sx. On statin. Tells me had stress test 12/2019.      HLD: on atorvastatin 20mg. Denies medication side effects. LDl at goal      Osteoporosis: DEXA 5/2019. On fosamax but not taking Ca and vit D right now; vit D level < 30.      Back pain: chronic. CT shows chronic degenerative changes from 5/2019. She is feeling better as she is not lifting her  everyday. No complaints today.      Past Medical History:   Diagnosis Date    High cholesterol     Hypertension        History reviewed. No pertinent family history.    Social History     Socioeconomic History    Marital status:      Spouse name: Not on file    Number of children: Not on file    Years of education: Not on file    Highest education level: Not on file   Occupational History    Not on file   Social Needs    Financial resource strain: Not on file    Food insecurity     Worry: Not on file     Inability: Not on file    Transportation needs     Medical: Not on file     Non-medical: Not on file   Tobacco Use    Smoking status: Never Smoker "   Substance and Sexual Activity    Alcohol use: No    Drug use: No    Sexual activity: Not on file   Lifestyle    Physical activity     Days per week: Not on file     Minutes per session: Not on file    Stress: Not on file   Relationships    Social connections     Talks on phone: Not on file     Gets together: Not on file     Attends [employer] service: Not on file     Active member of club or organization: Not on file     Attends meetings of clubs or organizations: Not on file     Relationship status: Not on file   Other Topics Concern    Not on file   Social History Narrative    Not on file       Review of Systems   Constitutional: Negative for activity change, fatigue, fever and unexpected weight change.   HENT: Negative for congestion, ear pain, hearing loss, rhinorrhea, sore throat and tinnitus.    Eyes: Negative for pain, redness and visual disturbance.   Respiratory: Negative for cough, shortness of breath and wheezing.    Cardiovascular: Negative for chest pain, palpitations and leg swelling.   Gastrointestinal: Negative for abdominal pain, blood in stool, constipation, diarrhea, nausea and vomiting.   Genitourinary: Negative for dysuria, frequency, pelvic pain and urgency.   Musculoskeletal: Negative for back pain, joint swelling and neck pain.   Skin: Negative for color change, rash and wound.   Neurological: Negative for dizziness, tremors, weakness, light-headedness and headaches.   Psychiatric/Behavioral: Positive for sleep disturbance.         Objective:      Physical Exam  Vitals signs reviewed.   Constitutional:       General: She is not in acute distress.     Appearance: She is well-developed.   HENT:      Head: Normocephalic and atraumatic.      Right Ear: External ear normal.      Left Ear: External ear normal.      Nose: Nose normal.      Mouth/Throat:      Mouth: Mucous membranes are moist.      Pharynx: Oropharynx is clear.   Eyes:      General:         Right eye: No discharge.          Left eye: No discharge.      Extraocular Movements: Extraocular movements intact.      Conjunctiva/sclera: Conjunctivae normal.      Pupils: Pupils are equal, round, and reactive to light.   Neck:      Musculoskeletal: Neck supple.      Thyroid: No thyromegaly.   Cardiovascular:      Rate and Rhythm: Normal rate and regular rhythm.      Heart sounds: No murmur.   Pulmonary:      Effort: Pulmonary effort is normal. No respiratory distress.      Breath sounds: Normal breath sounds. No wheezing or rales.   Abdominal:      General: Bowel sounds are normal. There is no distension.      Palpations: Abdomen is soft.      Tenderness: There is no abdominal tenderness.   Lymphadenopathy:      Cervical: No cervical adenopathy.   Skin:     General: Skin is warm and dry.   Neurological:      Mental Status: She is alert and oriented to person, place, and time.      Cranial Nerves: No cranial nerve deficit.   Psychiatric:         Behavior: Behavior normal.         Assessment:       1. Essential hypertension    2. Hyperlipidemia, unspecified hyperlipidemia type    3. Coronary artery disease involving native coronary artery of native heart without angina pectoris    4. Age-related osteoporosis without current pathological fracture    5. Insomnia, unspecified type    6. Need for vaccination        Plan:       Alexandra was seen today for follow-up.    Diagnoses and all orders for this visit:    Essential hypertension  -     CBC Auto Differential; Future  -     Comprehensive Metabolic Panel; Future  -     TSH; Future  -     Lipid Panel; Future  -     Vitamin D; Future  Chronic controlled  Continue medications at same dose  Low Na diet  Exercise, weight loss  Check BP and keep log for next visit    Hyperlipidemia, unspecified hyperlipidemia type  -     CBC Auto Differential; Future  -     Comprehensive Metabolic Panel; Future  -     TSH; Future  -     Lipid Panel; Future  -     Vitamin D; Future  Chronic controlled  Cont statin same  dose    Coronary artery disease involving native coronary artery of native heart without angina pectoris  -     CBC Auto Differential; Future  -     Comprehensive Metabolic Panel; Future  -     TSH; Future  -     Lipid Panel; Future  -     Vitamin D; Future  Chronic stable  Denies angina sx, remains very active  Cont meds same dose  Cont cardiology follow up    Age-related osteoporosis without current pathological fracture  -     cholecalciferol, vitamin D3, (VITAMIN D3) 25 mcg (1,000 unit) capsule; Take 1 capsule (1,000 Units total) by mouth once daily.  -     CBC Auto Differential; Future  -     Comprehensive Metabolic Panel; Future  -     TSH; Future  -     Lipid Panel; Future  -     Vitamin D; Future  Chronic stable  Vit D < 30, resume Vit D replacement  Cont fosamax and Ca supplementations  Exercise, walking    Insomnia, unspecified type  -     traZODone (DESYREL) 50 MG tablet; Take 1 tablet (50 mg total) by mouth every evening.  New problem  Melatonin not helpful  Add trazodone, side effects discussed  Call in 2-4 weeks if not effective    Need for vaccination  -     (In Office Administered) Pneumococcal Polysaccharide Vaccine (23 Valent) (SQ/IM)  Flu vaccine today       Health Maintenance:  -CRC: over age for screening  -PAP/MMG:  Over age for screening  -Bone Density: ordered  -tobacco: does not smoke  -Vaccines  Flu- 11/2020  Zoster- discussed  Pneumonia-  prevnar 13 done 4/2019; pneumovax 11/2020  Tetanus- done 4/2019    RTC 6 months with labs and PRN

## 2020-12-11 ENCOUNTER — PATIENT MESSAGE (OUTPATIENT)
Dept: OTHER | Facility: OTHER | Age: 85
End: 2020-12-11

## 2021-01-31 ENCOUNTER — EXTERNAL CHRONIC CARE MANAGEMENT (OUTPATIENT)
Dept: PRIMARY CARE CLINIC | Facility: CLINIC | Age: 86
End: 2021-01-31
Payer: MEDICARE

## 2021-01-31 PROCEDURE — 99490 CHRNC CARE MGMT STAFF 1ST 20: CPT | Mod: PBBFAC | Performed by: INTERNAL MEDICINE

## 2021-01-31 PROCEDURE — 99999 PR STA SHADOW: ICD-10-PCS | Mod: PBBFAC,,, | Performed by: INTERNAL MEDICINE

## 2021-01-31 PROCEDURE — 99490 CHRNC CARE MGMT STAFF 1ST 20: CPT | Mod: S$PBB,,, | Performed by: INTERNAL MEDICINE

## 2021-01-31 PROCEDURE — 99490 PR CHRONIC CARE MGMT, 1ST 20 MIN: ICD-10-PCS | Mod: S$PBB,,, | Performed by: INTERNAL MEDICINE

## 2021-01-31 PROCEDURE — 99999 PR STA SHADOW: CPT | Mod: PBBFAC,,, | Performed by: INTERNAL MEDICINE

## 2021-02-03 DIAGNOSIS — M81.0 AGE-RELATED OSTEOPOROSIS WITHOUT CURRENT PATHOLOGICAL FRACTURE: ICD-10-CM

## 2021-02-03 RX ORDER — VIT C/E/ZN/COPPR/LUTEIN/ZEAXAN 250MG-90MG
1000 CAPSULE ORAL DAILY
Qty: 90 CAPSULE | Refills: 3 | Status: SHIPPED | OUTPATIENT
Start: 2021-02-03 | End: 2021-05-18 | Stop reason: SDUPTHER

## 2021-02-28 ENCOUNTER — EXTERNAL CHRONIC CARE MANAGEMENT (OUTPATIENT)
Dept: PRIMARY CARE CLINIC | Facility: CLINIC | Age: 86
End: 2021-02-28
Payer: MEDICARE

## 2021-02-28 PROCEDURE — 99490 CHRNC CARE MGMT STAFF 1ST 20: CPT | Mod: PBBFAC | Performed by: INTERNAL MEDICINE

## 2021-02-28 PROCEDURE — 99490 CHRNC CARE MGMT STAFF 1ST 20: CPT | Mod: S$PBB,,, | Performed by: INTERNAL MEDICINE

## 2021-02-28 PROCEDURE — 99999 PR STA SHADOW: CPT | Mod: PBBFAC,,, | Performed by: INTERNAL MEDICINE

## 2021-02-28 PROCEDURE — 99490 PR CHRONIC CARE MGMT, 1ST 20 MIN: ICD-10-PCS | Mod: S$PBB,,, | Performed by: INTERNAL MEDICINE

## 2021-02-28 PROCEDURE — 99999 PR STA SHADOW: ICD-10-PCS | Mod: PBBFAC,,, | Performed by: INTERNAL MEDICINE

## 2021-03-31 ENCOUNTER — EXTERNAL CHRONIC CARE MANAGEMENT (OUTPATIENT)
Dept: PRIMARY CARE CLINIC | Facility: CLINIC | Age: 86
End: 2021-03-31
Payer: MEDICARE

## 2021-03-31 PROCEDURE — 99999 PR STA SHADOW: ICD-10-PCS | Mod: PBBFAC,,, | Performed by: INTERNAL MEDICINE

## 2021-03-31 PROCEDURE — 99490 CHRNC CARE MGMT STAFF 1ST 20: CPT | Mod: S$PBB,,, | Performed by: INTERNAL MEDICINE

## 2021-03-31 PROCEDURE — 99490 PR CHRONIC CARE MGMT, 1ST 20 MIN: ICD-10-PCS | Mod: S$PBB,,, | Performed by: INTERNAL MEDICINE

## 2021-03-31 PROCEDURE — 99999 PR STA SHADOW: CPT | Mod: PBBFAC,,, | Performed by: INTERNAL MEDICINE

## 2021-03-31 PROCEDURE — 99490 CHRNC CARE MGMT STAFF 1ST 20: CPT | Mod: PBBFAC | Performed by: INTERNAL MEDICINE

## 2021-04-19 ENCOUNTER — PES CALL (OUTPATIENT)
Dept: ADMINISTRATIVE | Facility: CLINIC | Age: 86
End: 2021-04-19

## 2021-04-30 ENCOUNTER — EXTERNAL CHRONIC CARE MANAGEMENT (OUTPATIENT)
Dept: PRIMARY CARE CLINIC | Facility: CLINIC | Age: 86
End: 2021-04-30
Payer: MEDICARE

## 2021-04-30 PROCEDURE — 99999 PR STA SHADOW: ICD-10-PCS | Mod: PBBFAC,,, | Performed by: INTERNAL MEDICINE

## 2021-04-30 PROCEDURE — 99490 CHRNC CARE MGMT STAFF 1ST 20: CPT | Mod: S$PBB,,, | Performed by: INTERNAL MEDICINE

## 2021-04-30 PROCEDURE — 99999 PR STA SHADOW: CPT | Mod: PBBFAC,,, | Performed by: INTERNAL MEDICINE

## 2021-04-30 PROCEDURE — 99490 CHRNC CARE MGMT STAFF 1ST 20: CPT | Mod: PBBFAC | Performed by: INTERNAL MEDICINE

## 2021-04-30 PROCEDURE — 99490 PR CHRONIC CARE MGMT, 1ST 20 MIN: ICD-10-PCS | Mod: S$PBB,,, | Performed by: INTERNAL MEDICINE

## 2021-05-06 ENCOUNTER — PATIENT MESSAGE (OUTPATIENT)
Dept: RESEARCH | Facility: HOSPITAL | Age: 86
End: 2021-05-06

## 2021-05-17 ENCOUNTER — LAB VISIT (OUTPATIENT)
Dept: INTERNAL MEDICINE | Facility: CLINIC | Age: 86
End: 2021-05-17
Payer: MEDICARE

## 2021-05-17 DIAGNOSIS — M81.0 AGE-RELATED OSTEOPOROSIS WITHOUT CURRENT PATHOLOGICAL FRACTURE: ICD-10-CM

## 2021-05-17 DIAGNOSIS — I10 ESSENTIAL HYPERTENSION: ICD-10-CM

## 2021-05-17 DIAGNOSIS — E78.5 HYPERLIPIDEMIA, UNSPECIFIED HYPERLIPIDEMIA TYPE: ICD-10-CM

## 2021-05-17 DIAGNOSIS — I25.10 CORONARY ARTERY DISEASE INVOLVING NATIVE CORONARY ARTERY OF NATIVE HEART WITHOUT ANGINA PECTORIS: ICD-10-CM

## 2021-05-17 LAB
25(OH)D3+25(OH)D2 SERPL-MCNC: 34 NG/ML (ref 30–96)
ALBUMIN SERPL BCP-MCNC: 4.1 G/DL (ref 3.5–5.2)
ALP SERPL-CCNC: 51 U/L (ref 55–135)
ALT SERPL W/O P-5'-P-CCNC: 20 U/L (ref 10–44)
ANION GAP SERPL CALC-SCNC: 9 MMOL/L (ref 8–16)
AST SERPL-CCNC: 21 U/L (ref 10–40)
BASOPHILS # BLD AUTO: 0.1 K/UL (ref 0–0.2)
BASOPHILS NFR BLD: 1.2 % (ref 0–1.9)
BILIRUB SERPL-MCNC: 0.6 MG/DL (ref 0.1–1)
BUN SERPL-MCNC: 16 MG/DL (ref 8–23)
CALCIUM SERPL-MCNC: 9.8 MG/DL (ref 8.7–10.5)
CHLORIDE SERPL-SCNC: 104 MMOL/L (ref 95–110)
CHOLEST SERPL-MCNC: 158 MG/DL (ref 120–199)
CHOLEST/HDLC SERPL: 2.3 {RATIO} (ref 2–5)
CO2 SERPL-SCNC: 25 MMOL/L (ref 23–29)
CREAT SERPL-MCNC: 0.9 MG/DL (ref 0.5–1.4)
DIFFERENTIAL METHOD: NORMAL
EOSINOPHIL # BLD AUTO: 0.2 K/UL (ref 0–0.5)
EOSINOPHIL NFR BLD: 2.1 % (ref 0–8)
ERYTHROCYTE [DISTWIDTH] IN BLOOD BY AUTOMATED COUNT: 13.2 % (ref 11.5–14.5)
EST. GFR  (AFRICAN AMERICAN): >60 ML/MIN/1.73 M^2
EST. GFR  (NON AFRICAN AMERICAN): 58 ML/MIN/1.73 M^2
GLUCOSE SERPL-MCNC: 96 MG/DL (ref 70–110)
HCT VFR BLD AUTO: 41.9 % (ref 37–48.5)
HDLC SERPL-MCNC: 68 MG/DL (ref 40–75)
HDLC SERPL: 43 % (ref 20–50)
HGB BLD-MCNC: 13.8 G/DL (ref 12–16)
IMM GRANULOCYTES # BLD AUTO: 0.02 K/UL (ref 0–0.04)
IMM GRANULOCYTES NFR BLD AUTO: 0.2 % (ref 0–0.5)
LDLC SERPL CALC-MCNC: 75.8 MG/DL (ref 63–159)
LYMPHOCYTES # BLD AUTO: 2 K/UL (ref 1–4.8)
LYMPHOCYTES NFR BLD: 23.1 % (ref 18–48)
MCH RBC QN AUTO: 29.4 PG (ref 27–31)
MCHC RBC AUTO-ENTMCNC: 32.9 G/DL (ref 32–36)
MCV RBC AUTO: 89 FL (ref 82–98)
MONOCYTES # BLD AUTO: 0.7 K/UL (ref 0.3–1)
MONOCYTES NFR BLD: 7.6 % (ref 4–15)
NEUTROPHILS # BLD AUTO: 5.7 K/UL (ref 1.8–7.7)
NEUTROPHILS NFR BLD: 65.8 % (ref 38–73)
NONHDLC SERPL-MCNC: 90 MG/DL
NRBC BLD-RTO: 0 /100 WBC
PLATELET # BLD AUTO: 280 K/UL (ref 150–450)
PMV BLD AUTO: 10.9 FL (ref 9.2–12.9)
POTASSIUM SERPL-SCNC: 4.3 MMOL/L (ref 3.5–5.1)
PROT SERPL-MCNC: 7.3 G/DL (ref 6–8.4)
RBC # BLD AUTO: 4.69 M/UL (ref 4–5.4)
SODIUM SERPL-SCNC: 138 MMOL/L (ref 136–145)
TRIGL SERPL-MCNC: 71 MG/DL (ref 30–150)
TSH SERPL DL<=0.005 MIU/L-ACNC: 1.69 UIU/ML (ref 0.4–4)
WBC # BLD AUTO: 8.65 K/UL (ref 3.9–12.7)

## 2021-05-17 PROCEDURE — 84443 ASSAY THYROID STIM HORMONE: CPT | Performed by: INTERNAL MEDICINE

## 2021-05-17 PROCEDURE — 36415 COLL VENOUS BLD VENIPUNCTURE: CPT | Mod: PBBFAC

## 2021-05-17 PROCEDURE — 99999 PR STA SHADOW: CPT | Mod: PBBFAC,,,

## 2021-05-17 PROCEDURE — 99999 PR STA SHADOW: ICD-10-PCS | Mod: PBBFAC,,,

## 2021-05-17 PROCEDURE — 80061 LIPID PANEL: CPT | Performed by: INTERNAL MEDICINE

## 2021-05-17 PROCEDURE — 85025 COMPLETE CBC W/AUTO DIFF WBC: CPT | Performed by: INTERNAL MEDICINE

## 2021-05-17 PROCEDURE — 82306 VITAMIN D 25 HYDROXY: CPT | Performed by: INTERNAL MEDICINE

## 2021-05-17 PROCEDURE — 80053 COMPREHEN METABOLIC PANEL: CPT | Performed by: INTERNAL MEDICINE

## 2021-05-18 ENCOUNTER — OFFICE VISIT (OUTPATIENT)
Dept: INTERNAL MEDICINE | Facility: CLINIC | Age: 86
End: 2021-05-18
Payer: MEDICARE

## 2021-05-18 VITALS
HEIGHT: 60 IN | WEIGHT: 120.81 LBS | BODY MASS INDEX: 23.72 KG/M2 | OXYGEN SATURATION: 98 % | HEART RATE: 57 BPM | DIASTOLIC BLOOD PRESSURE: 61 MMHG | SYSTOLIC BLOOD PRESSURE: 125 MMHG | RESPIRATION RATE: 18 BRPM

## 2021-05-18 DIAGNOSIS — I25.10 CORONARY ARTERY DISEASE INVOLVING NATIVE CORONARY ARTERY OF NATIVE HEART WITHOUT ANGINA PECTORIS: ICD-10-CM

## 2021-05-18 DIAGNOSIS — E78.5 HYPERLIPIDEMIA, UNSPECIFIED HYPERLIPIDEMIA TYPE: ICD-10-CM

## 2021-05-18 DIAGNOSIS — N18.31 STAGE 3A CHRONIC KIDNEY DISEASE: ICD-10-CM

## 2021-05-18 DIAGNOSIS — M81.0 AGE-RELATED OSTEOPOROSIS WITHOUT CURRENT PATHOLOGICAL FRACTURE: ICD-10-CM

## 2021-05-18 DIAGNOSIS — I10 ESSENTIAL HYPERTENSION: Primary | ICD-10-CM

## 2021-05-18 DIAGNOSIS — F32.0 CURRENT MILD EPISODE OF MAJOR DEPRESSIVE DISORDER WITHOUT PRIOR EPISODE: ICD-10-CM

## 2021-05-18 PROCEDURE — 99999 PR PBB SHADOW E&M-EST. PATIENT-LVL IV: CPT | Mod: PBBFAC,,, | Performed by: INTERNAL MEDICINE

## 2021-05-18 PROCEDURE — 99999 PR PBB SHADOW E&M-EST. PATIENT-LVL IV: ICD-10-PCS | Mod: PBBFAC,,, | Performed by: INTERNAL MEDICINE

## 2021-05-18 PROCEDURE — 99999 PR STA SHADOW: CPT | Mod: PBBFAC,,, | Performed by: INTERNAL MEDICINE

## 2021-05-18 PROCEDURE — 99214 OFFICE O/P EST MOD 30 MIN: CPT | Mod: S$PBB | Performed by: INTERNAL MEDICINE

## 2021-05-18 PROCEDURE — 99214 OFFICE O/P EST MOD 30 MIN: CPT | Mod: PBBFAC | Performed by: INTERNAL MEDICINE

## 2021-05-18 RX ORDER — VIT C/E/ZN/COPPR/LUTEIN/ZEAXAN 250MG-90MG
1000 CAPSULE ORAL DAILY
Qty: 90 CAPSULE | Refills: 3 | Status: SHIPPED | OUTPATIENT
Start: 2021-05-18

## 2021-05-31 ENCOUNTER — EXTERNAL CHRONIC CARE MANAGEMENT (OUTPATIENT)
Dept: PRIMARY CARE CLINIC | Facility: CLINIC | Age: 86
End: 2021-05-31
Payer: MEDICARE

## 2021-05-31 PROCEDURE — 99490 CHRNC CARE MGMT STAFF 1ST 20: CPT | Mod: S$PBB,,, | Performed by: INTERNAL MEDICINE

## 2021-05-31 PROCEDURE — 99490 PR CHRONIC CARE MGMT, 1ST 20 MIN: ICD-10-PCS | Mod: S$PBB,,, | Performed by: INTERNAL MEDICINE

## 2021-05-31 PROCEDURE — 99999 PR STA SHADOW: ICD-10-PCS | Mod: PBBFAC,,, | Performed by: INTERNAL MEDICINE

## 2021-05-31 PROCEDURE — 99490 CHRNC CARE MGMT STAFF 1ST 20: CPT | Mod: PBBFAC | Performed by: INTERNAL MEDICINE

## 2021-05-31 PROCEDURE — 99999 PR STA SHADOW: CPT | Mod: PBBFAC,,, | Performed by: INTERNAL MEDICINE

## 2021-06-30 ENCOUNTER — EXTERNAL CHRONIC CARE MANAGEMENT (OUTPATIENT)
Dept: PRIMARY CARE CLINIC | Facility: CLINIC | Age: 86
End: 2021-06-30
Payer: MEDICARE

## 2021-06-30 PROCEDURE — 99490 CHRNC CARE MGMT STAFF 1ST 20: CPT | Mod: S$PBB,,, | Performed by: INTERNAL MEDICINE

## 2021-06-30 PROCEDURE — 99999 PR STA SHADOW: ICD-10-PCS | Mod: PBBFAC,,, | Performed by: INTERNAL MEDICINE

## 2021-06-30 PROCEDURE — 99490 PR CHRONIC CARE MGMT, 1ST 20 MIN: ICD-10-PCS | Mod: S$PBB,,, | Performed by: INTERNAL MEDICINE

## 2021-06-30 PROCEDURE — 99490 CHRNC CARE MGMT STAFF 1ST 20: CPT | Mod: PBBFAC | Performed by: INTERNAL MEDICINE

## 2021-06-30 PROCEDURE — 99999 PR STA SHADOW: CPT | Mod: PBBFAC,,, | Performed by: INTERNAL MEDICINE

## 2021-07-02 ENCOUNTER — PES CALL (OUTPATIENT)
Dept: ADMINISTRATIVE | Facility: CLINIC | Age: 86
End: 2021-07-02

## 2021-07-31 ENCOUNTER — EXTERNAL CHRONIC CARE MANAGEMENT (OUTPATIENT)
Dept: PRIMARY CARE CLINIC | Facility: CLINIC | Age: 86
End: 2021-07-31
Payer: MEDICARE

## 2021-07-31 PROCEDURE — 99490 CHRNC CARE MGMT STAFF 1ST 20: CPT | Mod: S$PBB,,, | Performed by: INTERNAL MEDICINE

## 2021-07-31 PROCEDURE — 99999 PR STA SHADOW: ICD-10-PCS | Mod: PBBFAC,,, | Performed by: INTERNAL MEDICINE

## 2021-07-31 PROCEDURE — 99490 PR CHRONIC CARE MGMT, 1ST 20 MIN: ICD-10-PCS | Mod: S$PBB,,, | Performed by: INTERNAL MEDICINE

## 2021-07-31 PROCEDURE — 99999 PR STA SHADOW: CPT | Mod: PBBFAC,,, | Performed by: INTERNAL MEDICINE

## 2021-07-31 PROCEDURE — 99490 CHRNC CARE MGMT STAFF 1ST 20: CPT | Mod: PBBFAC | Performed by: INTERNAL MEDICINE

## 2021-08-31 ENCOUNTER — EXTERNAL CHRONIC CARE MANAGEMENT (OUTPATIENT)
Dept: PRIMARY CARE CLINIC | Facility: CLINIC | Age: 86
End: 2021-08-31
Payer: MEDICARE

## 2021-08-31 PROCEDURE — 99999 PR STA SHADOW: ICD-10-PCS | Mod: PBBFAC,,, | Performed by: INTERNAL MEDICINE

## 2021-08-31 PROCEDURE — 99999 PR STA SHADOW: CPT | Mod: PBBFAC,,, | Performed by: INTERNAL MEDICINE

## 2021-08-31 PROCEDURE — 99490 CHRNC CARE MGMT STAFF 1ST 20: CPT | Mod: S$PBB,,, | Performed by: INTERNAL MEDICINE

## 2021-08-31 PROCEDURE — 99490 CHRNC CARE MGMT STAFF 1ST 20: CPT | Mod: PBBFAC | Performed by: INTERNAL MEDICINE

## 2021-08-31 PROCEDURE — 99490 PR CHRONIC CARE MGMT, 1ST 20 MIN: ICD-10-PCS | Mod: S$PBB,,, | Performed by: INTERNAL MEDICINE

## 2021-09-30 ENCOUNTER — EXTERNAL CHRONIC CARE MANAGEMENT (OUTPATIENT)
Dept: PRIMARY CARE CLINIC | Facility: CLINIC | Age: 86
End: 2021-09-30
Payer: MEDICARE

## 2021-09-30 PROCEDURE — 99999 PR STA SHADOW: CPT | Mod: PBBFAC,,, | Performed by: INTERNAL MEDICINE

## 2021-09-30 PROCEDURE — 99490 PR CHRONIC CARE MGMT, 1ST 20 MIN: ICD-10-PCS | Mod: S$PBB,,, | Performed by: INTERNAL MEDICINE

## 2021-09-30 PROCEDURE — 99490 CHRNC CARE MGMT STAFF 1ST 20: CPT | Mod: S$PBB,,, | Performed by: INTERNAL MEDICINE

## 2021-09-30 PROCEDURE — 99490 CHRNC CARE MGMT STAFF 1ST 20: CPT | Mod: PBBFAC | Performed by: INTERNAL MEDICINE

## 2021-09-30 PROCEDURE — 99999 PR STA SHADOW: ICD-10-PCS | Mod: PBBFAC,,, | Performed by: INTERNAL MEDICINE

## 2021-10-31 ENCOUNTER — EXTERNAL CHRONIC CARE MANAGEMENT (OUTPATIENT)
Dept: PRIMARY CARE CLINIC | Facility: CLINIC | Age: 86
End: 2021-10-31
Payer: MEDICARE

## 2021-10-31 PROCEDURE — 99490 PR CHRONIC CARE MGMT, 1ST 20 MIN: ICD-10-PCS | Mod: S$PBB,,, | Performed by: INTERNAL MEDICINE

## 2021-10-31 PROCEDURE — 99999 PR STA SHADOW: ICD-10-PCS | Mod: PBBFAC,,, | Performed by: INTERNAL MEDICINE

## 2021-10-31 PROCEDURE — 99490 CHRNC CARE MGMT STAFF 1ST 20: CPT | Mod: PBBFAC | Performed by: INTERNAL MEDICINE

## 2021-10-31 PROCEDURE — 99490 CHRNC CARE MGMT STAFF 1ST 20: CPT | Mod: S$PBB,,, | Performed by: INTERNAL MEDICINE

## 2021-10-31 PROCEDURE — 99999 PR STA SHADOW: CPT | Mod: PBBFAC,,, | Performed by: INTERNAL MEDICINE

## 2021-11-01 RX ORDER — ALENDRONATE SODIUM 70 MG/1
70 TABLET ORAL
Qty: 12 TABLET | Refills: 3 | Status: SHIPPED | OUTPATIENT
Start: 2021-11-01 | End: 2022-04-12 | Stop reason: SDUPTHER

## 2021-11-30 ENCOUNTER — EXTERNAL CHRONIC CARE MANAGEMENT (OUTPATIENT)
Dept: PRIMARY CARE CLINIC | Facility: CLINIC | Age: 86
End: 2021-11-30
Payer: MEDICARE

## 2021-11-30 PROCEDURE — 99439 PR CHRONIC CARE MGMT, EA ADDTL 20 MIN: ICD-10-PCS | Mod: S$PBB,,, | Performed by: INTERNAL MEDICINE

## 2021-11-30 PROCEDURE — 99490 CHRNC CARE MGMT STAFF 1ST 20: CPT | Mod: PBBFAC | Performed by: INTERNAL MEDICINE

## 2021-11-30 PROCEDURE — 99439 CHRNC CARE MGMT STAF EA ADDL: CPT | Mod: S$PBB,,, | Performed by: INTERNAL MEDICINE

## 2021-11-30 PROCEDURE — 99999 PR STA SHADOW: CPT | Mod: PBBFAC,,, | Performed by: INTERNAL MEDICINE

## 2021-11-30 PROCEDURE — 99439 CHRNC CARE MGMT STAF EA ADDL: CPT | Mod: PBBFAC | Performed by: INTERNAL MEDICINE

## 2021-11-30 PROCEDURE — 99490 PR CHRONIC CARE MGMT, 1ST 20 MIN: ICD-10-PCS | Mod: S$PBB,,, | Performed by: INTERNAL MEDICINE

## 2021-11-30 PROCEDURE — 99999 PR STA SHADOW: ICD-10-PCS | Mod: PBBFAC,,, | Performed by: INTERNAL MEDICINE

## 2021-11-30 PROCEDURE — 99490 CHRNC CARE MGMT STAFF 1ST 20: CPT | Mod: S$PBB,,, | Performed by: INTERNAL MEDICINE

## 2021-12-13 ENCOUNTER — LAB VISIT (OUTPATIENT)
Dept: INTERNAL MEDICINE | Facility: CLINIC | Age: 86
End: 2021-12-13
Payer: MEDICARE

## 2021-12-13 DIAGNOSIS — E78.5 HYPERLIPIDEMIA, UNSPECIFIED HYPERLIPIDEMIA TYPE: ICD-10-CM

## 2021-12-13 DIAGNOSIS — F32.0 CURRENT MILD EPISODE OF MAJOR DEPRESSIVE DISORDER WITHOUT PRIOR EPISODE: ICD-10-CM

## 2021-12-13 DIAGNOSIS — M81.0 AGE-RELATED OSTEOPOROSIS WITHOUT CURRENT PATHOLOGICAL FRACTURE: ICD-10-CM

## 2021-12-13 DIAGNOSIS — I10 ESSENTIAL HYPERTENSION: ICD-10-CM

## 2021-12-13 DIAGNOSIS — N18.31 STAGE 3A CHRONIC KIDNEY DISEASE: ICD-10-CM

## 2021-12-13 DIAGNOSIS — I25.10 CORONARY ARTERY DISEASE INVOLVING NATIVE CORONARY ARTERY OF NATIVE HEART WITHOUT ANGINA PECTORIS: ICD-10-CM

## 2021-12-13 LAB
25(OH)D3+25(OH)D2 SERPL-MCNC: 33 NG/ML (ref 30–96)
ALBUMIN SERPL BCP-MCNC: 4.3 G/DL (ref 3.5–5.2)
ALP SERPL-CCNC: 68 U/L (ref 55–135)
ALT SERPL W/O P-5'-P-CCNC: 15 U/L (ref 10–44)
ANION GAP SERPL CALC-SCNC: 9 MMOL/L (ref 8–16)
AST SERPL-CCNC: 21 U/L (ref 10–40)
BASOPHILS # BLD AUTO: 0.08 K/UL (ref 0–0.2)
BASOPHILS NFR BLD: 1.1 % (ref 0–1.9)
BILIRUB SERPL-MCNC: 1 MG/DL (ref 0.1–1)
BUN SERPL-MCNC: 18 MG/DL (ref 8–23)
CALCIUM SERPL-MCNC: 9.7 MG/DL (ref 8.7–10.5)
CHLORIDE SERPL-SCNC: 104 MMOL/L (ref 95–110)
CHOLEST SERPL-MCNC: 156 MG/DL (ref 120–199)
CHOLEST/HDLC SERPL: 2.8 {RATIO} (ref 2–5)
CO2 SERPL-SCNC: 26 MMOL/L (ref 23–29)
CREAT SERPL-MCNC: 1 MG/DL (ref 0.5–1.4)
DIFFERENTIAL METHOD: NORMAL
EOSINOPHIL # BLD AUTO: 0.2 K/UL (ref 0–0.5)
EOSINOPHIL NFR BLD: 3.1 % (ref 0–8)
ERYTHROCYTE [DISTWIDTH] IN BLOOD BY AUTOMATED COUNT: 13.3 % (ref 11.5–14.5)
EST. GFR  (AFRICAN AMERICAN): 59 ML/MIN/1.73 M^2
EST. GFR  (NON AFRICAN AMERICAN): 51 ML/MIN/1.73 M^2
GLUCOSE SERPL-MCNC: 101 MG/DL (ref 70–110)
HCT VFR BLD AUTO: 42.3 % (ref 37–48.5)
HDLC SERPL-MCNC: 56 MG/DL (ref 40–75)
HDLC SERPL: 35.9 % (ref 20–50)
HGB BLD-MCNC: 14 G/DL (ref 12–16)
IMM GRANULOCYTES # BLD AUTO: 0.03 K/UL (ref 0–0.04)
IMM GRANULOCYTES NFR BLD AUTO: 0.4 % (ref 0–0.5)
LDLC SERPL CALC-MCNC: 85.6 MG/DL (ref 63–159)
LYMPHOCYTES # BLD AUTO: 1.7 K/UL (ref 1–4.8)
LYMPHOCYTES NFR BLD: 23.2 % (ref 18–48)
MCH RBC QN AUTO: 29.5 PG (ref 27–31)
MCHC RBC AUTO-ENTMCNC: 33.1 G/DL (ref 32–36)
MCV RBC AUTO: 89 FL (ref 82–98)
MONOCYTES # BLD AUTO: 0.7 K/UL (ref 0.3–1)
MONOCYTES NFR BLD: 9.4 % (ref 4–15)
NEUTROPHILS # BLD AUTO: 4.7 K/UL (ref 1.8–7.7)
NEUTROPHILS NFR BLD: 62.8 % (ref 38–73)
NONHDLC SERPL-MCNC: 100 MG/DL
NRBC BLD-RTO: 0 /100 WBC
PLATELET # BLD AUTO: 284 K/UL (ref 150–450)
PMV BLD AUTO: 10.9 FL (ref 9.2–12.9)
POTASSIUM SERPL-SCNC: 4.6 MMOL/L (ref 3.5–5.1)
PROT SERPL-MCNC: 6.9 G/DL (ref 6–8.4)
RBC # BLD AUTO: 4.75 M/UL (ref 4–5.4)
SODIUM SERPL-SCNC: 139 MMOL/L (ref 136–145)
TRIGL SERPL-MCNC: 72 MG/DL (ref 30–150)
TSH SERPL DL<=0.005 MIU/L-ACNC: 2.71 UIU/ML (ref 0.4–4)
WBC # BLD AUTO: 7.46 K/UL (ref 3.9–12.7)

## 2021-12-13 PROCEDURE — 80061 LIPID PANEL: CPT | Performed by: INTERNAL MEDICINE

## 2021-12-13 PROCEDURE — 82306 VITAMIN D 25 HYDROXY: CPT | Performed by: INTERNAL MEDICINE

## 2021-12-13 PROCEDURE — 85025 COMPLETE CBC W/AUTO DIFF WBC: CPT | Performed by: INTERNAL MEDICINE

## 2021-12-13 PROCEDURE — 36415 COLL VENOUS BLD VENIPUNCTURE: CPT | Mod: PBBFAC

## 2021-12-13 PROCEDURE — 80053 COMPREHEN METABOLIC PANEL: CPT | Performed by: INTERNAL MEDICINE

## 2021-12-13 PROCEDURE — 84443 ASSAY THYROID STIM HORMONE: CPT | Performed by: INTERNAL MEDICINE

## 2021-12-13 PROCEDURE — 99999 PR STA SHADOW: CPT | Mod: PBBFAC,,,

## 2021-12-13 PROCEDURE — 99999 PR STA SHADOW: ICD-10-PCS | Mod: PBBFAC,,,

## 2021-12-17 ENCOUNTER — OFFICE VISIT (OUTPATIENT)
Dept: INTERNAL MEDICINE | Facility: CLINIC | Age: 86
End: 2021-12-17
Payer: MEDICARE

## 2021-12-17 VITALS
WEIGHT: 116.63 LBS | HEIGHT: 60 IN | SYSTOLIC BLOOD PRESSURE: 132 MMHG | BODY MASS INDEX: 22.9 KG/M2 | HEART RATE: 57 BPM | DIASTOLIC BLOOD PRESSURE: 68 MMHG | OXYGEN SATURATION: 99 % | RESPIRATION RATE: 16 BRPM

## 2021-12-17 DIAGNOSIS — E78.5 HYPERLIPIDEMIA, UNSPECIFIED HYPERLIPIDEMIA TYPE: ICD-10-CM

## 2021-12-17 DIAGNOSIS — M81.0 AGE-RELATED OSTEOPOROSIS WITHOUT CURRENT PATHOLOGICAL FRACTURE: ICD-10-CM

## 2021-12-17 DIAGNOSIS — Z23 NEED FOR VACCINATION: ICD-10-CM

## 2021-12-17 DIAGNOSIS — I10 PRIMARY HYPERTENSION: ICD-10-CM

## 2021-12-17 DIAGNOSIS — I25.10 CORONARY ARTERY DISEASE INVOLVING NATIVE CORONARY ARTERY OF NATIVE HEART WITHOUT ANGINA PECTORIS: Primary | ICD-10-CM

## 2021-12-17 PROCEDURE — 99999 PR PBB SHADOW E&M-EST. PATIENT-LVL IV: ICD-10-PCS | Mod: PBBFAC,,, | Performed by: INTERNAL MEDICINE

## 2021-12-17 PROCEDURE — 99214 OFFICE O/P EST MOD 30 MIN: CPT | Mod: PBBFAC,25 | Performed by: INTERNAL MEDICINE

## 2021-12-17 PROCEDURE — 99999 PR STA SHADOW: CPT | Mod: PBBFAC,,, | Performed by: INTERNAL MEDICINE

## 2021-12-17 PROCEDURE — 99999 FLU VACCINE - QUADRIVALENT - ADJUVANTED: ICD-10-PCS | Mod: PBBFAC,,,

## 2021-12-17 PROCEDURE — G0008 ADMIN INFLUENZA VIRUS VAC: HCPCS | Mod: PBBFAC

## 2021-12-17 PROCEDURE — 99214 OFFICE O/P EST MOD 30 MIN: CPT | Mod: S$PBB | Performed by: INTERNAL MEDICINE

## 2021-12-17 PROCEDURE — 90694 VACC AIIV4 NO PRSRV 0.5ML IM: CPT | Mod: PBBFAC

## 2021-12-17 PROCEDURE — 99999 FLU VACCINE - QUADRIVALENT - ADJUVANTED: CPT | Mod: PBBFAC,,,

## 2021-12-17 PROCEDURE — 99999 PR PBB SHADOW E&M-EST. PATIENT-LVL IV: CPT | Mod: PBBFAC,,, | Performed by: INTERNAL MEDICINE

## 2022-04-12 RX ORDER — ALENDRONATE SODIUM 70 MG/1
70 TABLET ORAL
Qty: 12 TABLET | Refills: 3 | Status: SHIPPED | OUTPATIENT
Start: 2022-04-12 | End: 2023-05-16

## 2022-04-12 NOTE — TELEPHONE ENCOUNTER
LOV 12/17/2021  Scheduled visit 6/17/2022    Requested Prescriptions     Pending Prescriptions Disp Refills    alendronate (FOSAMAX) 70 MG tablet 12 tablet 3     Sig: Take 1 tablet (70 mg total) by mouth every 7 days.

## 2022-05-31 ENCOUNTER — EXTERNAL CHRONIC CARE MANAGEMENT (OUTPATIENT)
Dept: PRIMARY CARE CLINIC | Facility: CLINIC | Age: 87
End: 2022-05-31
Payer: MEDICARE

## 2022-05-31 PROCEDURE — 99999 PR STA SHADOW: ICD-10-PCS | Mod: PBBFAC,,, | Performed by: INTERNAL MEDICINE

## 2022-05-31 PROCEDURE — 99490 CHRNC CARE MGMT STAFF 1ST 20: CPT | Mod: PBBFAC | Performed by: INTERNAL MEDICINE

## 2022-05-31 PROCEDURE — 99490 CHRNC CARE MGMT STAFF 1ST 20: CPT | Mod: S$PBB,,, | Performed by: INTERNAL MEDICINE

## 2022-05-31 PROCEDURE — 99490 PR CHRONIC CARE MGMT, 1ST 20 MIN: ICD-10-PCS | Mod: S$PBB,,, | Performed by: INTERNAL MEDICINE

## 2022-05-31 PROCEDURE — 99999 PR STA SHADOW: CPT | Mod: PBBFAC,,, | Performed by: INTERNAL MEDICINE

## 2022-06-10 ENCOUNTER — LAB VISIT (OUTPATIENT)
Dept: LAB | Facility: HOSPITAL | Age: 87
End: 2022-06-10
Attending: INTERNAL MEDICINE
Payer: MEDICARE

## 2022-06-10 DIAGNOSIS — M81.0 AGE-RELATED OSTEOPOROSIS WITHOUT CURRENT PATHOLOGICAL FRACTURE: ICD-10-CM

## 2022-06-10 DIAGNOSIS — I10 PRIMARY HYPERTENSION: ICD-10-CM

## 2022-06-10 DIAGNOSIS — E78.5 HYPERLIPIDEMIA, UNSPECIFIED HYPERLIPIDEMIA TYPE: ICD-10-CM

## 2022-06-10 DIAGNOSIS — I25.10 CORONARY ARTERY DISEASE INVOLVING NATIVE CORONARY ARTERY OF NATIVE HEART WITHOUT ANGINA PECTORIS: ICD-10-CM

## 2022-06-10 LAB
25(OH)D3+25(OH)D2 SERPL-MCNC: 31 NG/ML (ref 30–96)
ALBUMIN SERPL BCP-MCNC: 3.9 G/DL (ref 3.5–5.2)
ALP SERPL-CCNC: 51 U/L (ref 55–135)
ALT SERPL W/O P-5'-P-CCNC: 17 U/L (ref 10–44)
ANION GAP SERPL CALC-SCNC: 11 MMOL/L (ref 8–16)
AST SERPL-CCNC: 22 U/L (ref 10–40)
BASOPHILS # BLD AUTO: 0.08 K/UL (ref 0–0.2)
BASOPHILS NFR BLD: 1.1 % (ref 0–1.9)
BILIRUB SERPL-MCNC: 0.8 MG/DL (ref 0.1–1)
BUN SERPL-MCNC: 16 MG/DL (ref 8–23)
CALCIUM SERPL-MCNC: 9.6 MG/DL (ref 8.7–10.5)
CHLORIDE SERPL-SCNC: 105 MMOL/L (ref 95–110)
CHOLEST SERPL-MCNC: 148 MG/DL (ref 120–199)
CHOLEST/HDLC SERPL: 2.4 {RATIO} (ref 2–5)
CO2 SERPL-SCNC: 24 MMOL/L (ref 23–29)
CREAT SERPL-MCNC: 0.9 MG/DL (ref 0.5–1.4)
DIFFERENTIAL METHOD: NORMAL
EOSINOPHIL # BLD AUTO: 0.2 K/UL (ref 0–0.5)
EOSINOPHIL NFR BLD: 2.6 % (ref 0–8)
ERYTHROCYTE [DISTWIDTH] IN BLOOD BY AUTOMATED COUNT: 13.3 % (ref 11.5–14.5)
EST. GFR  (AFRICAN AMERICAN): >60 ML/MIN/1.73 M^2
EST. GFR  (NON AFRICAN AMERICAN): 57 ML/MIN/1.73 M^2
GLUCOSE SERPL-MCNC: 98 MG/DL (ref 70–110)
HCT VFR BLD AUTO: 38.1 % (ref 37–48.5)
HDLC SERPL-MCNC: 61 MG/DL (ref 40–75)
HDLC SERPL: 41.2 % (ref 20–50)
HGB BLD-MCNC: 12.7 G/DL (ref 12–16)
IMM GRANULOCYTES # BLD AUTO: 0.01 K/UL (ref 0–0.04)
IMM GRANULOCYTES NFR BLD AUTO: 0.1 % (ref 0–0.5)
LDLC SERPL CALC-MCNC: 74.2 MG/DL (ref 63–159)
LYMPHOCYTES # BLD AUTO: 2 K/UL (ref 1–4.8)
LYMPHOCYTES NFR BLD: 26.6 % (ref 18–48)
MCH RBC QN AUTO: 29.2 PG (ref 27–31)
MCHC RBC AUTO-ENTMCNC: 33.3 G/DL (ref 32–36)
MCV RBC AUTO: 88 FL (ref 82–98)
MONOCYTES # BLD AUTO: 0.7 K/UL (ref 0.3–1)
MONOCYTES NFR BLD: 9.4 % (ref 4–15)
NEUTROPHILS # BLD AUTO: 4.4 K/UL (ref 1.8–7.7)
NEUTROPHILS NFR BLD: 60.2 % (ref 38–73)
NONHDLC SERPL-MCNC: 87 MG/DL
NRBC BLD-RTO: 0 /100 WBC
PLATELET # BLD AUTO: 260 K/UL (ref 150–450)
PMV BLD AUTO: 10 FL (ref 9.2–12.9)
POTASSIUM SERPL-SCNC: 4.5 MMOL/L (ref 3.5–5.1)
PROT SERPL-MCNC: 6.7 G/DL (ref 6–8.4)
RBC # BLD AUTO: 4.35 M/UL (ref 4–5.4)
SODIUM SERPL-SCNC: 140 MMOL/L (ref 136–145)
TRIGL SERPL-MCNC: 64 MG/DL (ref 30–150)
WBC # BLD AUTO: 7.34 K/UL (ref 3.9–12.7)

## 2022-06-10 PROCEDURE — 80053 COMPREHEN METABOLIC PANEL: CPT | Performed by: INTERNAL MEDICINE

## 2022-06-10 PROCEDURE — 36415 COLL VENOUS BLD VENIPUNCTURE: CPT | Performed by: INTERNAL MEDICINE

## 2022-06-10 PROCEDURE — 80061 LIPID PANEL: CPT | Performed by: INTERNAL MEDICINE

## 2022-06-10 PROCEDURE — 82306 VITAMIN D 25 HYDROXY: CPT | Performed by: INTERNAL MEDICINE

## 2022-06-10 PROCEDURE — 85025 COMPLETE CBC W/AUTO DIFF WBC: CPT | Performed by: INTERNAL MEDICINE

## 2022-06-17 ENCOUNTER — OFFICE VISIT (OUTPATIENT)
Dept: INTERNAL MEDICINE | Facility: CLINIC | Age: 87
End: 2022-06-17
Payer: MEDICARE

## 2022-06-17 VITALS
HEART RATE: 57 BPM | HEIGHT: 60 IN | WEIGHT: 116.19 LBS | DIASTOLIC BLOOD PRESSURE: 76 MMHG | BODY MASS INDEX: 22.81 KG/M2 | OXYGEN SATURATION: 99 % | RESPIRATION RATE: 16 BRPM | SYSTOLIC BLOOD PRESSURE: 136 MMHG

## 2022-06-17 DIAGNOSIS — E78.5 HYPERLIPIDEMIA, UNSPECIFIED HYPERLIPIDEMIA TYPE: ICD-10-CM

## 2022-06-17 DIAGNOSIS — N18.31 STAGE 3A CHRONIC KIDNEY DISEASE: ICD-10-CM

## 2022-06-17 DIAGNOSIS — I10 PRIMARY HYPERTENSION: Primary | ICD-10-CM

## 2022-06-17 DIAGNOSIS — I25.10 CORONARY ARTERY DISEASE INVOLVING NATIVE CORONARY ARTERY OF NATIVE HEART WITHOUT ANGINA PECTORIS: ICD-10-CM

## 2022-06-17 DIAGNOSIS — G89.29 CHRONIC BILATERAL LOW BACK PAIN WITHOUT SCIATICA: ICD-10-CM

## 2022-06-17 DIAGNOSIS — M54.50 CHRONIC BILATERAL LOW BACK PAIN WITHOUT SCIATICA: ICD-10-CM

## 2022-06-17 DIAGNOSIS — M81.0 AGE-RELATED OSTEOPOROSIS WITHOUT CURRENT PATHOLOGICAL FRACTURE: ICD-10-CM

## 2022-06-17 PROBLEM — F32.0 CURRENT MILD EPISODE OF MAJOR DEPRESSIVE DISORDER WITHOUT PRIOR EPISODE: Status: RESOLVED | Noted: 2019-04-05 | Resolved: 2022-06-17

## 2022-06-17 PROCEDURE — 99214 OFFICE O/P EST MOD 30 MIN: CPT | Mod: PBBFAC | Performed by: INTERNAL MEDICINE

## 2022-06-17 PROCEDURE — 99999 PR STA SHADOW: ICD-10-PCS | Mod: PBBFAC,,, | Performed by: INTERNAL MEDICINE

## 2022-06-17 PROCEDURE — 99999 PR STA SHADOW: CPT | Mod: PBBFAC,,, | Performed by: INTERNAL MEDICINE

## 2022-06-17 PROCEDURE — 99214 OFFICE O/P EST MOD 30 MIN: CPT | Mod: S$PBB | Performed by: INTERNAL MEDICINE

## 2022-06-17 PROCEDURE — 99999 PR PBB SHADOW E&M-EST. PATIENT-LVL IV: CPT | Mod: PBBFAC,,, | Performed by: INTERNAL MEDICINE

## 2022-06-17 RX ORDER — DICLOFENAC SODIUM 10 MG/G
2 GEL TOPICAL 2 TIMES DAILY
Qty: 100 G | Refills: 5 | Status: SHIPPED | OUTPATIENT
Start: 2022-06-17 | End: 2023-05-16

## 2022-06-17 NOTE — PROGRESS NOTES
"  Subjective:       Patient ID: Alexandra Gleason is a 88 y.o. female.    Chief Complaint: Follow-up and Back Pain      HPI:    Patient is known to me and presents for follow up CAD, HTN, HLD and anxiety. Labs from 12/13/21 personally reviewed and discussed with the patient today.      Mr. Hewitt () passed away from advanced dementia.  She misses him but she is much less "stressed" with not having to care for him everyday.   She was not sleeping well after his death. Melatonin not helpful. Added trazodone and working well. Denies med side effects. Mood is good today.   No sx depression.      HTN: on lisinopril-HCTZ. On for years. Does not check BP at home. Denies chest pains, SOB, LE edema. A bit elevated today.     CAD: follows with Dr. Bhakta. No stents or h/o MI, found on angiogram. Denies angian sx. On statin. Tells me had stress test 12/2019.      HLD: on atorvastatin 20mg. Denies medication side effects. LDl at goal      Osteoporosis: DEXA 5/2019. On fosamax, Ca and vit D right, Vit D normalized     Back pain: chronic. CT shows chronic degenerative changes from 5/2019. She is reporting worsening back pain for last few monts. She is not sleeping well due to pain. Has trired OTC NSAIDS. Tylneol, topical patches without relief. No falls/injuries. No numbness or tingling sensation in extremities.        Past Medical History:   Diagnosis Date    High cholesterol     Hypertension        History reviewed. No pertinent family history.    Social History     Socioeconomic History    Marital status:    Tobacco Use    Smoking status: Never Smoker   Substance and Sexual Activity    Alcohol use: No    Drug use: No       Review of Systems   Constitutional: Negative for activity change, fatigue, fever and unexpected weight change.   HENT: Negative for congestion, ear pain, hearing loss, rhinorrhea and sore throat.    Eyes: Negative for pain, redness and visual disturbance.   Respiratory: Negative for " cough, shortness of breath and wheezing.    Cardiovascular: Negative for chest pain, palpitations and leg swelling.   Gastrointestinal: Negative for abdominal pain, constipation, diarrhea, nausea and vomiting.   Genitourinary: Negative for dysuria, frequency, pelvic pain and urgency.   Musculoskeletal: Positive for back pain. Negative for joint swelling and neck pain.   Skin: Negative for color change, rash and wound.   Neurological: Negative for dizziness, tremors, weakness, light-headedness and headaches.         Objective:      Physical Exam  Vitals reviewed.   Constitutional:       General: She is not in acute distress.     Appearance: She is well-developed.   HENT:      Head: Normocephalic and atraumatic.      Right Ear: External ear normal.      Left Ear: External ear normal.      Nose: Nose normal.   Eyes:      General:         Right eye: No discharge.         Left eye: No discharge.      Extraocular Movements: Extraocular movements intact.      Conjunctiva/sclera: Conjunctivae normal.      Pupils: Pupils are equal, round, and reactive to light.   Neck:      Thyroid: No thyromegaly.   Cardiovascular:      Rate and Rhythm: Normal rate and regular rhythm.      Heart sounds: No murmur heard.  Pulmonary:      Effort: Pulmonary effort is normal. No respiratory distress.      Breath sounds: Normal breath sounds. No wheezing or rales.   Abdominal:      General: Bowel sounds are normal. There is no distension.      Palpations: Abdomen is soft.      Tenderness: There is no abdominal tenderness.   Musculoskeletal:         General: Tenderness (mild lumbar paraspinal; no vertebral point tenderness) present.   Skin:     General: Skin is warm and dry.   Neurological:      Mental Status: She is alert and oriented to person, place, and time.      Cranial Nerves: No cranial nerve deficit.   Psychiatric:         Behavior: Behavior normal.         Thought Content: Thought content normal.         Assessment:       1. Primary  hypertension    2. Hyperlipidemia, unspecified hyperlipidemia type    3. Coronary artery disease involving native coronary artery of native heart without angina pectoris    4. Stage 3a chronic kidney disease    5. Age-related osteoporosis without current pathological fracture    6. Chronic bilateral low back pain without sciatica        Plan:       Alexandra was seen today for follow-up and back pain.    Diagnoses and all orders for this visit:    Primary hypertension  -     CBC Auto Differential; Future  -     Comprehensive Metabolic Panel; Future  -     Lipid Panel; Future  Chronic stable  Continue medications at same dose  Low Na diet  Exercise, weight loss  Check BP and keep log for next visit    Hyperlipidemia, unspecified hyperlipidemia type  -     CBC Auto Differential; Future  -     Comprehensive Metabolic Panel; Future  -     Lipid Panel; Future  Chronic controlled  Cont statin same dose    Coronary artery disease involving native coronary artery of native heart without angina pectoris  -     CBC Auto Differential; Future  -     Comprehensive Metabolic Panel; Future  -     Lipid Panel; Future  Chronic stable  No angina sx  Cont statin  Cont BP control  Follow up cards as scheduled    Stage 3a chronic kidney disease  -     CBC Auto Differential; Future  -     Comprehensive Metabolic Panel; Future  -     Lipid Panel; Future  Chronic stable  Follow labs  Avoid nephrotoxic agents    Age-related osteoporosis without current pathological fracture  Chronic stable  Cont fosamax, Ca and Vit D (normalized)  Exercise    Chronic bilateral low back pain without sciatica  -     Ambulatory referral/consult to Physical/Occupational Therapy; Future  -     diclofenac sodium (VOLTAREN) 1 % Gel; Apply 2 g topically 2 (two) times daily.  Chronic, worsening  Add voltaren gel due to renal fxn  PRN tylenol  Start PT for pain control and mobility    Health Maintenance:  -CRC: over age for screening  -PAP/MMG:  Over age for  screening  -Bone Density: ordered  -tobacco: does not smoke  -Vaccines  Flu- 12/2021  Zoster- discussed  Pneumonia-  prevnar 13 done 4/2019; pneumovax 11/2020  Tetanus- done 4/2019  COVID 19 completed      RTC 6 months and PRN

## 2022-06-30 ENCOUNTER — EXTERNAL CHRONIC CARE MANAGEMENT (OUTPATIENT)
Dept: PRIMARY CARE CLINIC | Facility: CLINIC | Age: 87
End: 2022-06-30
Payer: MEDICARE

## 2022-06-30 PROCEDURE — 99999 PR STA SHADOW: ICD-10-PCS | Mod: PBBFAC,,, | Performed by: INTERNAL MEDICINE

## 2022-06-30 PROCEDURE — 99490 PR CHRONIC CARE MGMT, 1ST 20 MIN: ICD-10-PCS | Mod: S$PBB,,, | Performed by: INTERNAL MEDICINE

## 2022-06-30 PROCEDURE — 99490 CHRNC CARE MGMT STAFF 1ST 20: CPT | Mod: S$PBB,,, | Performed by: INTERNAL MEDICINE

## 2022-06-30 PROCEDURE — 99999 PR STA SHADOW: CPT | Mod: PBBFAC,,, | Performed by: INTERNAL MEDICINE

## 2022-06-30 PROCEDURE — 99490 CHRNC CARE MGMT STAFF 1ST 20: CPT | Mod: PBBFAC | Performed by: INTERNAL MEDICINE

## 2022-07-31 ENCOUNTER — EXTERNAL CHRONIC CARE MANAGEMENT (OUTPATIENT)
Dept: PRIMARY CARE CLINIC | Facility: CLINIC | Age: 87
End: 2022-07-31
Payer: MEDICARE

## 2022-07-31 PROCEDURE — 99999 PR STA SHADOW: CPT | Mod: PBBFAC,,, | Performed by: INTERNAL MEDICINE

## 2022-07-31 PROCEDURE — 99490 PR CHRONIC CARE MGMT, 1ST 20 MIN: ICD-10-PCS | Mod: S$PBB,,, | Performed by: INTERNAL MEDICINE

## 2022-07-31 PROCEDURE — 99490 CHRNC CARE MGMT STAFF 1ST 20: CPT | Mod: PBBFAC | Performed by: INTERNAL MEDICINE

## 2022-07-31 PROCEDURE — 99490 CHRNC CARE MGMT STAFF 1ST 20: CPT | Mod: S$PBB,,, | Performed by: INTERNAL MEDICINE

## 2022-07-31 PROCEDURE — 99999 PR STA SHADOW: ICD-10-PCS | Mod: PBBFAC,,, | Performed by: INTERNAL MEDICINE

## 2022-08-22 ENCOUNTER — TELEPHONE (OUTPATIENT)
Dept: INTERNAL MEDICINE | Facility: CLINIC | Age: 87
End: 2022-08-22
Payer: MEDICARE

## 2022-08-22 NOTE — TELEPHONE ENCOUNTER
Can take OTC imodium and if not improved in a few days needs appointment to evaluate further. Drink plenty of fluids to stay hydrated.

## 2022-08-24 NOTE — TELEPHONE ENCOUNTER
Attempted to contact pt. Unable to LVM. Due to several failed attempted to contact pt, encounter will be closed.

## 2022-08-31 ENCOUNTER — EXTERNAL CHRONIC CARE MANAGEMENT (OUTPATIENT)
Dept: PRIMARY CARE CLINIC | Facility: CLINIC | Age: 87
End: 2022-08-31
Payer: MEDICARE

## 2022-08-31 PROCEDURE — 99439 CHRNC CARE MGMT STAF EA ADDL: CPT | Mod: PBBFAC,27 | Performed by: INTERNAL MEDICINE

## 2022-08-31 PROCEDURE — 99490 PR CHRONIC CARE MGMT, 1ST 20 MIN: ICD-10-PCS | Mod: S$PBB,,, | Performed by: INTERNAL MEDICINE

## 2022-08-31 PROCEDURE — 99439 PR CHRONIC CARE MGMT, EA ADDTL 20 MIN: ICD-10-PCS | Mod: S$PBB,,, | Performed by: INTERNAL MEDICINE

## 2022-08-31 PROCEDURE — 99439 CHRNC CARE MGMT STAF EA ADDL: CPT | Mod: S$PBB,,, | Performed by: INTERNAL MEDICINE

## 2022-08-31 PROCEDURE — 99490 CHRNC CARE MGMT STAFF 1ST 20: CPT | Mod: S$PBB,,, | Performed by: INTERNAL MEDICINE

## 2022-08-31 PROCEDURE — 99490 CHRNC CARE MGMT STAFF 1ST 20: CPT | Mod: PBBFAC,25 | Performed by: INTERNAL MEDICINE

## 2022-09-30 ENCOUNTER — EXTERNAL CHRONIC CARE MANAGEMENT (OUTPATIENT)
Dept: PRIMARY CARE CLINIC | Facility: CLINIC | Age: 87
End: 2022-09-30
Payer: MEDICARE

## 2022-09-30 PROCEDURE — 99490 PR CHRONIC CARE MGMT, 1ST 20 MIN: ICD-10-PCS | Mod: S$PBB,,, | Performed by: INTERNAL MEDICINE

## 2022-09-30 PROCEDURE — 99490 CHRNC CARE MGMT STAFF 1ST 20: CPT | Mod: PBBFAC | Performed by: INTERNAL MEDICINE

## 2022-09-30 PROCEDURE — 99490 CHRNC CARE MGMT STAFF 1ST 20: CPT | Mod: S$PBB,,, | Performed by: INTERNAL MEDICINE

## 2022-10-18 ENCOUNTER — TELEPHONE (OUTPATIENT)
Dept: INTERNAL MEDICINE | Facility: CLINIC | Age: 87
End: 2022-10-18
Payer: MEDICARE

## 2022-10-18 NOTE — TELEPHONE ENCOUNTER
Suzanne CALHOUN Carrie DUNCAN Staff  Phone Number: [phone]     Good afternoon,     CC spoke with pt today for monthly health check. pt is still c/o diarrhea that is not being controlled with Imodium or Pepto-Bismol. pt would like to get a prescription strength medication to help with diarrhea. CC found pt had gallbladder out in pt history. CC educated pt on diet with gallbladder removal. pt unaware of diet being cause for diarrhea. CC educated pt on keeping a food diary and a BM diary to see which foods are triggers for pt's diarrhea and advised pt to avoid greasy, fried, and spicy food. pt does not eat or drink anything when travelling as pt is always in need of a bathroom. Would you like me to schedule pt or are you able to call a medication in for pt? Thank you and if I can assist any further please let me know., CLARK SHIELDS LPN   [facility]   Care Coordinator   [phone]

## 2022-10-31 ENCOUNTER — EXTERNAL CHRONIC CARE MANAGEMENT (OUTPATIENT)
Dept: PRIMARY CARE CLINIC | Facility: CLINIC | Age: 87
End: 2022-10-31
Payer: MEDICARE

## 2022-10-31 PROCEDURE — 99489 CPLX CHRNC CARE EA ADDL 30: CPT | Mod: PBBFAC,27 | Performed by: INTERNAL MEDICINE

## 2022-10-31 PROCEDURE — 99487 PR COMPLX CHRON CARE MGMT, 1ST HR, PER MONTH: ICD-10-PCS | Mod: S$PBB,,, | Performed by: INTERNAL MEDICINE

## 2022-10-31 PROCEDURE — 99489 CPLX CHRNC CARE EA ADDL 30: CPT | Mod: S$PBB,,, | Performed by: INTERNAL MEDICINE

## 2022-10-31 PROCEDURE — 99487 CPLX CHRNC CARE 1ST 60 MIN: CPT | Mod: S$PBB,,, | Performed by: INTERNAL MEDICINE

## 2022-10-31 PROCEDURE — 99487 CPLX CHRNC CARE 1ST 60 MIN: CPT | Mod: PBBFAC,25 | Performed by: INTERNAL MEDICINE

## 2022-10-31 PROCEDURE — 99489 PR COMPLX CHRON CARE MGMT, EA ADDTL 30 MIN, PER MONTH: ICD-10-PCS | Mod: S$PBB,,, | Performed by: INTERNAL MEDICINE

## 2022-11-30 ENCOUNTER — EXTERNAL CHRONIC CARE MANAGEMENT (OUTPATIENT)
Dept: PRIMARY CARE CLINIC | Facility: CLINIC | Age: 87
End: 2022-11-30
Payer: MEDICARE

## 2022-11-30 PROCEDURE — 99439 CHRNC CARE MGMT STAF EA ADDL: CPT | Mod: PBBFAC | Performed by: INTERNAL MEDICINE

## 2022-11-30 PROCEDURE — 99490 PR CHRONIC CARE MGMT, 1ST 20 MIN: ICD-10-PCS | Mod: S$PBB,,, | Performed by: INTERNAL MEDICINE

## 2022-11-30 PROCEDURE — 99439 CHRNC CARE MGMT STAF EA ADDL: CPT | Mod: S$PBB,,, | Performed by: INTERNAL MEDICINE

## 2022-11-30 PROCEDURE — 99490 CHRNC CARE MGMT STAFF 1ST 20: CPT | Mod: PBBFAC,25 | Performed by: INTERNAL MEDICINE

## 2022-11-30 PROCEDURE — 99439 PR CHRONIC CARE MGMT, EA ADDTL 20 MIN: ICD-10-PCS | Mod: S$PBB,,, | Performed by: INTERNAL MEDICINE

## 2022-11-30 PROCEDURE — 99490 CHRNC CARE MGMT STAFF 1ST 20: CPT | Mod: S$PBB,,, | Performed by: INTERNAL MEDICINE

## 2022-12-09 ENCOUNTER — LAB VISIT (OUTPATIENT)
Dept: LAB | Facility: HOSPITAL | Age: 87
End: 2022-12-09
Attending: INTERNAL MEDICINE
Payer: MEDICARE

## 2022-12-09 DIAGNOSIS — E78.5 HYPERLIPIDEMIA, UNSPECIFIED HYPERLIPIDEMIA TYPE: ICD-10-CM

## 2022-12-09 DIAGNOSIS — I10 PRIMARY HYPERTENSION: ICD-10-CM

## 2022-12-09 DIAGNOSIS — I25.10 CORONARY ARTERY DISEASE INVOLVING NATIVE CORONARY ARTERY OF NATIVE HEART WITHOUT ANGINA PECTORIS: ICD-10-CM

## 2022-12-09 DIAGNOSIS — N18.31 STAGE 3A CHRONIC KIDNEY DISEASE: ICD-10-CM

## 2022-12-09 LAB
ALBUMIN SERPL BCP-MCNC: 4 G/DL (ref 3.5–5.2)
ALP SERPL-CCNC: 53 U/L (ref 55–135)
ALT SERPL W/O P-5'-P-CCNC: 21 U/L (ref 10–44)
ANION GAP SERPL CALC-SCNC: 10 MMOL/L (ref 8–16)
AST SERPL-CCNC: 20 U/L (ref 10–40)
BASOPHILS # BLD AUTO: 0.09 K/UL (ref 0–0.2)
BASOPHILS NFR BLD: 1.1 % (ref 0–1.9)
BILIRUB SERPL-MCNC: 0.6 MG/DL (ref 0.1–1)
BUN SERPL-MCNC: 24 MG/DL (ref 8–23)
CALCIUM SERPL-MCNC: 9.7 MG/DL (ref 8.7–10.5)
CHLORIDE SERPL-SCNC: 105 MMOL/L (ref 95–110)
CHOLEST SERPL-MCNC: 151 MG/DL (ref 120–199)
CHOLEST/HDLC SERPL: 2.6 {RATIO} (ref 2–5)
CO2 SERPL-SCNC: 24 MMOL/L (ref 23–29)
CREAT SERPL-MCNC: 1.1 MG/DL (ref 0.5–1.4)
DIFFERENTIAL METHOD: NORMAL
EOSINOPHIL # BLD AUTO: 0.4 K/UL (ref 0–0.5)
EOSINOPHIL NFR BLD: 4.3 % (ref 0–8)
ERYTHROCYTE [DISTWIDTH] IN BLOOD BY AUTOMATED COUNT: 13.1 % (ref 11.5–14.5)
EST. GFR  (NO RACE VARIABLE): 48 ML/MIN/1.73 M^2
GLUCOSE SERPL-MCNC: 97 MG/DL (ref 70–110)
HCT VFR BLD AUTO: 40.4 % (ref 37–48.5)
HDLC SERPL-MCNC: 57 MG/DL (ref 40–75)
HDLC SERPL: 37.7 % (ref 20–50)
HGB BLD-MCNC: 13.7 G/DL (ref 12–16)
IMM GRANULOCYTES # BLD AUTO: 0.03 K/UL (ref 0–0.04)
IMM GRANULOCYTES NFR BLD AUTO: 0.4 % (ref 0–0.5)
LDLC SERPL CALC-MCNC: 78.4 MG/DL (ref 63–159)
LYMPHOCYTES # BLD AUTO: 2.1 K/UL (ref 1–4.8)
LYMPHOCYTES NFR BLD: 26 % (ref 18–48)
MCH RBC QN AUTO: 28.7 PG (ref 27–31)
MCHC RBC AUTO-ENTMCNC: 33.9 G/DL (ref 32–36)
MCV RBC AUTO: 85 FL (ref 82–98)
MONOCYTES # BLD AUTO: 0.8 K/UL (ref 0.3–1)
MONOCYTES NFR BLD: 10 % (ref 4–15)
NEUTROPHILS # BLD AUTO: 4.8 K/UL (ref 1.8–7.7)
NEUTROPHILS NFR BLD: 58.2 % (ref 38–73)
NONHDLC SERPL-MCNC: 94 MG/DL
NRBC BLD-RTO: 0 /100 WBC
PLATELET # BLD AUTO: 293 K/UL (ref 150–450)
PMV BLD AUTO: 10.2 FL (ref 9.2–12.9)
POTASSIUM SERPL-SCNC: 4.8 MMOL/L (ref 3.5–5.1)
PROT SERPL-MCNC: 7 G/DL (ref 6–8.4)
RBC # BLD AUTO: 4.77 M/UL (ref 4–5.4)
SODIUM SERPL-SCNC: 139 MMOL/L (ref 136–145)
TRIGL SERPL-MCNC: 78 MG/DL (ref 30–150)
WBC # BLD AUTO: 8.16 K/UL (ref 3.9–12.7)

## 2022-12-09 PROCEDURE — 80053 COMPREHEN METABOLIC PANEL: CPT | Performed by: INTERNAL MEDICINE

## 2022-12-09 PROCEDURE — 85025 COMPLETE CBC W/AUTO DIFF WBC: CPT | Performed by: INTERNAL MEDICINE

## 2022-12-09 PROCEDURE — 80061 LIPID PANEL: CPT | Performed by: INTERNAL MEDICINE

## 2022-12-09 PROCEDURE — 36415 COLL VENOUS BLD VENIPUNCTURE: CPT | Performed by: INTERNAL MEDICINE

## 2022-12-13 ENCOUNTER — OFFICE VISIT (OUTPATIENT)
Dept: INTERNAL MEDICINE | Facility: CLINIC | Age: 87
End: 2022-12-13
Payer: MEDICARE

## 2022-12-13 VITALS
HEIGHT: 60 IN | RESPIRATION RATE: 16 BRPM | SYSTOLIC BLOOD PRESSURE: 118 MMHG | DIASTOLIC BLOOD PRESSURE: 70 MMHG | OXYGEN SATURATION: 97 % | HEART RATE: 61 BPM | BODY MASS INDEX: 23.67 KG/M2 | WEIGHT: 120.56 LBS

## 2022-12-13 DIAGNOSIS — I25.10 CORONARY ARTERY DISEASE INVOLVING NATIVE CORONARY ARTERY OF NATIVE HEART WITHOUT ANGINA PECTORIS: ICD-10-CM

## 2022-12-13 DIAGNOSIS — K58.0 IRRITABLE BOWEL SYNDROME WITH DIARRHEA: ICD-10-CM

## 2022-12-13 DIAGNOSIS — Z23 NEED FOR VACCINATION: ICD-10-CM

## 2022-12-13 DIAGNOSIS — I10 PRIMARY HYPERTENSION: Primary | ICD-10-CM

## 2022-12-13 DIAGNOSIS — E78.5 HYPERLIPIDEMIA, UNSPECIFIED HYPERLIPIDEMIA TYPE: ICD-10-CM

## 2022-12-13 DIAGNOSIS — M81.0 AGE-RELATED OSTEOPOROSIS WITHOUT CURRENT PATHOLOGICAL FRACTURE: ICD-10-CM

## 2022-12-13 DIAGNOSIS — N18.31 STAGE 3A CHRONIC KIDNEY DISEASE: ICD-10-CM

## 2022-12-13 PROCEDURE — 99999 PR STA SHADOW: CPT | Mod: PBBFAC,,, | Performed by: INTERNAL MEDICINE

## 2022-12-13 PROCEDURE — 99999 FLU VACCINE - QUADRIVALENT - ADJUVANTED: ICD-10-PCS | Mod: PBBFAC,,,

## 2022-12-13 PROCEDURE — 90694 VACC AIIV4 NO PRSRV 0.5ML IM: CPT | Mod: PBBFAC

## 2022-12-13 PROCEDURE — 99999 PR PBB SHADOW E&M-EST. PATIENT-LVL III: CPT | Mod: PBBFAC,,, | Performed by: INTERNAL MEDICINE

## 2022-12-13 PROCEDURE — 99999 PR PBB SHADOW E&M-EST. PATIENT-LVL III: ICD-10-PCS | Mod: PBBFAC,,, | Performed by: INTERNAL MEDICINE

## 2022-12-13 PROCEDURE — 99213 OFFICE O/P EST LOW 20 MIN: CPT | Mod: PBBFAC,25 | Performed by: INTERNAL MEDICINE

## 2022-12-13 PROCEDURE — 99999 FLU VACCINE - QUADRIVALENT - ADJUVANTED: CPT | Mod: PBBFAC,,,

## 2022-12-13 PROCEDURE — 99214 OFFICE O/P EST MOD 30 MIN: CPT | Mod: S$PBB | Performed by: INTERNAL MEDICINE

## 2022-12-13 PROCEDURE — G0008 ADMIN INFLUENZA VIRUS VAC: HCPCS | Mod: PBBFAC

## 2022-12-13 NOTE — PROGRESS NOTES
"Subjective:       Patient ID: Alexandra Gleason is a 88 y.o. female.    Chief Complaint: Follow-up      HPI:  Patient is known to me and presents for follow up CAD, HTN, HLD and anxiety. Labs from 12/9/22 personally reviewed and discussed with the patient today.      Mr. Hewitt () passed away from advanced dementia in 2020.  She misses him but she is much less "stressed" with not having to care for him everyday.   She was not sleeping well after his death. Melatonin not helpful. Added trazodone and working well. Denies med side effects. Mood is good today.   No sx depression.      HTN: on lisinopril-HCTZ. On for years. Does not check BP at home. Denies chest pains, SOB, LE edema. A bit elevated today.     CAD: follows with Dr. Bhakta. No stents or h/o MI, found on angiogram. Denies angian sx. On statin. Tells me had stress test 12/2019.      HLD: on atorvastatin 20mg. Denies medication side effects. LDl at goal      Osteoporosis: DEXA 5/2019. On fosamax, Ca and vit D right, Vit D normalized     Back pain: chronic. CT shows chronic degenerative changes from 5/2019. She is reporting worsening back pain for last few monts. She is not sleeping well due to pain. Has trired OTC NSAIDS. Tylneol, topical patches without relief. No falls/injuries. No numbness or tingling sensation in extremities.      Today she is reporting intermittent diarrhea sx. She has had issues with "loose stools" for years. Notices it is happening more frequently. She is "OCD" by her own description and does not if feeling anxious GI sx are a bit worse. No food triggers identified. No vomiting, nausea. Taking immodium and just started probiotic.     Past Medical History:   Diagnosis Date    High cholesterol     Hypertension        History reviewed. No pertinent family history.    Social History     Socioeconomic History    Marital status:    Tobacco Use    Smoking status: Never   Substance and Sexual Activity    Alcohol use: No    Drug " use: No       Review of Systems   Constitutional:  Negative for activity change, fatigue, fever and unexpected weight change.   HENT:  Negative for congestion, ear pain, hearing loss, rhinorrhea and sore throat.    Eyes:  Negative for pain, redness and visual disturbance.   Respiratory:  Negative for cough, shortness of breath and wheezing.    Cardiovascular:  Negative for chest pain, palpitations and leg swelling.   Gastrointestinal:  Positive for diarrhea. Negative for abdominal pain, blood in stool, constipation, nausea and vomiting.   Genitourinary:  Negative for dysuria, frequency, pelvic pain and urgency.   Musculoskeletal:  Negative for back pain, joint swelling and neck pain.   Skin:  Negative for color change, rash and wound.   Neurological:  Negative for dizziness, tremors, weakness, light-headedness and headaches.       Objective:      Physical Exam  Vitals reviewed.   Constitutional:       General: She is not in acute distress.     Appearance: She is well-developed.   HENT:      Head: Normocephalic and atraumatic.      Right Ear: External ear normal.      Left Ear: External ear normal.      Nose: Nose normal.   Eyes:      General:         Right eye: No discharge.         Left eye: No discharge.      Extraocular Movements: Extraocular movements intact.      Conjunctiva/sclera: Conjunctivae normal.      Pupils: Pupils are equal, round, and reactive to light.   Neck:      Thyroid: No thyromegaly.   Cardiovascular:      Rate and Rhythm: Normal rate and regular rhythm.      Heart sounds: No murmur heard.  Pulmonary:      Effort: Pulmonary effort is normal. No respiratory distress.      Breath sounds: Normal breath sounds. No wheezing or rales.   Abdominal:      General: Bowel sounds are normal. There is no distension.      Palpations: Abdomen is soft.      Tenderness: There is no abdominal tenderness.   Skin:     General: Skin is warm and dry.   Neurological:      Mental Status: She is alert and oriented to  person, place, and time.      Cranial Nerves: No cranial nerve deficit.   Psychiatric:         Behavior: Behavior normal.         Thought Content: Thought content normal.       Assessment:       1. Primary hypertension    2. Hyperlipidemia, unspecified hyperlipidemia type    3. Coronary artery disease involving native coronary artery of native heart without angina pectoris    4. Stage 3a chronic kidney disease    5. Age-related osteoporosis without current pathological fracture    6. Irritable bowel syndrome with diarrhea    7. Need for vaccination        Plan:       Alexandra was seen today for follow-up.    Diagnoses and all orders for this visit:    Primary hypertension  -     CBC Auto Differential; Future  -     Comprehensive Metabolic Panel; Future  -     TSH; Future  -     Lipid Panel; Future  Chronic stable  Continue medications at same dose  Low Na diet  Exercise, weight loss  Check BP and keep log for next visit    Hyperlipidemia, unspecified hyperlipidemia type  -     CBC Auto Differential; Future  -     Comprehensive Metabolic Panel; Future  -     TSH; Future  -     Lipid Panel; Future  Chronic stable  Cont meds same dose    Coronary artery disease involving native coronary artery of native heart without angina pectoris  -     CBC Auto Differential; Future  -     Comprehensive Metabolic Panel; Future  -     TSH; Future  -     Lipid Panel; Future  Chronic stable  Cont statin, ASA    Stage 3a chronic kidney disease  -     CBC Auto Differential; Future  -     Comprehensive Metabolic Panel; Future  -     TSH; Future  -     Lipid Panel; Future  Chronic stable  Follow labs  Avoid nephrotoxic agents  Stay hydrated    Age-related osteoporosis without current pathological fracture  -     Comprehensive Metabolic Panel; Future  Chronic stable  Cont meds same dose  Ca and Vit D daily    Irritable bowel syndrome with diarrhea  New problem  Long standing GI issues, maybe occurring more frequently now  Discussed bentyl for  cramping but does not feel necessary at this point  Cont PRN imodium  Increase fiber for stool bulking  Ok to trial probiotic as well  Food diary to identify triggers  Need for vaccination  -     Influenza - Quadrivalent (Adjuvanted)        Health Maintenance:  -CRC: over age for screening  -PAP/MMG:  Over age for screening  -Bone Density: ordered  -tobacco: does not smoke  -Vaccines  Flu- 12/2022  Zoster- discussed  Pneumonia-  prevnar 13 done 4/2019; pneumovax 11/2020  Tetanus- done 4/2019  COVID 19 completed    RTC 6 months with labs and pRN

## 2022-12-31 ENCOUNTER — EXTERNAL CHRONIC CARE MANAGEMENT (OUTPATIENT)
Dept: PRIMARY CARE CLINIC | Facility: CLINIC | Age: 87
End: 2022-12-31
Payer: MEDICARE

## 2022-12-31 PROCEDURE — 99999 PR STA SHADOW: CPT | Mod: PBBFAC,,, | Performed by: INTERNAL MEDICINE

## 2022-12-31 PROCEDURE — 99490 CHRNC CARE MGMT STAFF 1ST 20: CPT | Mod: PBBFAC | Performed by: INTERNAL MEDICINE

## 2022-12-31 PROCEDURE — 99490 PR CHRONIC CARE MGMT, 1ST 20 MIN: ICD-10-PCS | Mod: S$PBB,,, | Performed by: INTERNAL MEDICINE

## 2022-12-31 PROCEDURE — 99999 PR STA SHADOW: ICD-10-PCS | Mod: PBBFAC,,, | Performed by: INTERNAL MEDICINE

## 2022-12-31 PROCEDURE — 99490 CHRNC CARE MGMT STAFF 1ST 20: CPT | Mod: S$PBB,,, | Performed by: INTERNAL MEDICINE

## 2023-01-04 ENCOUNTER — OFFICE VISIT (OUTPATIENT)
Dept: INTERNAL MEDICINE | Facility: CLINIC | Age: 88
End: 2023-01-04
Payer: MEDICARE

## 2023-01-04 VITALS
RESPIRATION RATE: 16 BRPM | SYSTOLIC BLOOD PRESSURE: 124 MMHG | BODY MASS INDEX: 22.68 KG/M2 | DIASTOLIC BLOOD PRESSURE: 72 MMHG | WEIGHT: 115.5 LBS | OXYGEN SATURATION: 99 % | HEART RATE: 75 BPM | HEIGHT: 60 IN

## 2023-01-04 DIAGNOSIS — R05.9 COUGH, UNSPECIFIED TYPE: Primary | ICD-10-CM

## 2023-01-04 DIAGNOSIS — J20.9 ACUTE BRONCHITIS, UNSPECIFIED ORGANISM: ICD-10-CM

## 2023-01-04 LAB
CTP QC/QA: YES
CTP QC/QA: YES
POC MOLECULAR INFLUENZA A AGN: NEGATIVE
POC MOLECULAR INFLUENZA B AGN: NEGATIVE
SARS-COV-2 AG RESP QL IA.RAPID: NEGATIVE

## 2023-01-04 PROCEDURE — 99999 SARS CORONAVIRUS 2 ANTIGEN POCT, MANUAL READ: CPT | Mod: PBBFAC,,, | Performed by: INTERNAL MEDICINE

## 2023-01-04 PROCEDURE — 99999 PR STA SHADOW: CPT | Mod: PBBFAC,,, | Performed by: INTERNAL MEDICINE

## 2023-01-04 PROCEDURE — 99999 PR PBB SHADOW E&M-EST. PATIENT-LVL III: CPT | Mod: PBBFAC,,, | Performed by: INTERNAL MEDICINE

## 2023-01-04 PROCEDURE — 99213 OFFICE O/P EST LOW 20 MIN: CPT | Mod: PBBFAC | Performed by: INTERNAL MEDICINE

## 2023-01-04 PROCEDURE — 99999 POCT INFLUENZA A/B MOLECULAR: CPT | Mod: PBBFAC,,, | Performed by: INTERNAL MEDICINE

## 2023-01-04 PROCEDURE — 87502 INFLUENZA DNA AMP PROBE: CPT | Mod: PBBFAC | Performed by: INTERNAL MEDICINE

## 2023-01-04 PROCEDURE — 87811 SARS-COV-2 COVID19 W/OPTIC: CPT | Mod: PBBFAC | Performed by: INTERNAL MEDICINE

## 2023-01-04 PROCEDURE — 99999 SARS CORONAVIRUS 2 ANTIGEN POCT, MANUAL READ: ICD-10-PCS | Mod: PBBFAC,,, | Performed by: INTERNAL MEDICINE

## 2023-01-04 PROCEDURE — 99213 OFFICE O/P EST LOW 20 MIN: CPT | Mod: S$PBB | Performed by: INTERNAL MEDICINE

## 2023-01-04 RX ORDER — BENZONATATE 200 MG/1
200 CAPSULE ORAL 3 TIMES DAILY PRN
Qty: 30 CAPSULE | Refills: 0 | Status: SHIPPED | OUTPATIENT
Start: 2023-01-04 | End: 2023-01-14

## 2023-01-04 RX ORDER — ALBUTEROL SULFATE 90 UG/1
2 AEROSOL, METERED RESPIRATORY (INHALATION) EVERY 6 HOURS PRN
Qty: 18 G | Refills: 1 | Status: SHIPPED | OUTPATIENT
Start: 2023-01-04 | End: 2023-05-16

## 2023-01-04 RX ORDER — DOXYCYCLINE 100 MG/1
100 CAPSULE ORAL EVERY 12 HOURS
Qty: 20 CAPSULE | Refills: 0 | Status: SHIPPED | OUTPATIENT
Start: 2023-01-04 | End: 2023-01-14

## 2023-01-04 NOTE — PROGRESS NOTES
HPI:  Alexandra Gleason is a 88 y.o. female here for Cough, Shortness of Breath, Sore Throat, Chest Congestion, and Nasal Congestion  Cough: Patient complains of dyspnea, productive cough, sneezing, and sore throat.  Symptoms began 5 days ago.  The cough is without wheezing, dyspnea or hemoptysis and is aggravated by cold air Associated symptoms include:night sweats, shortness of breath, and sputum production.    Review of Systems   Constitutional:  Positive for diaphoresis and fatigue. Negative for appetite change, chills and fever.   HENT:  Positive for congestion, postnasal drip, rhinorrhea, sinus pressure and sore throat. Negative for ear discharge, ear pain and hearing loss.    Eyes:  Negative for photophobia.   Respiratory:  Negative for cough, choking, chest tightness, shortness of breath and wheezing.         With coughing and increased activity   Cardiovascular:  Negative for chest pain and palpitations.   Gastrointestinal:  Negative for blood in stool, constipation, diarrhea, nausea and vomiting.   Genitourinary:  Negative for dysuria and hematuria.   Musculoskeletal:  Negative for arthralgias, joint swelling and myalgias.   Skin:  Negative for pallor, rash and wound.   Neurological:  Positive for headaches. Negative for dizziness, syncope, weakness, light-headedness and numbness.   Hematological:  Does not bruise/bleed easily.   Psychiatric/Behavioral:  Negative for confusion and suicidal ideas. The patient is not nervous/anxious.     A review of systems was performed and is negative except as noted above.    Objective:  Vitals:    01/04/23 0752   BP: 124/72   Pulse: 75   Resp: 16   SpO2: 99%   Weight: 52.4 kg (115 lb 8.3 oz)   Height: 5' (1.524 m)      Physical Exam  Vitals and nursing note reviewed.   Constitutional:       Appearance: She is well-developed.   HENT:      Head: Normocephalic and atraumatic.      Right Ear: External ear normal.      Left Ear: External ear normal.      Nose: Nose normal.    Cardiovascular:      Rate and Rhythm: Normal rate and regular rhythm.      Heart sounds: Normal heart sounds.   Pulmonary:      Effort: Pulmonary effort is normal.      Breath sounds: Wheezing present.   Abdominal:      General: Bowel sounds are normal.      Palpations: Abdomen is soft.   Skin:     General: Skin is warm and dry.   Neurological:      Mental Status: She is alert and oriented to person, place, and time.        Assessment/Plan:  1. Cough, unspecified type  -     SARS Coronavirus 2 Antigen, POCT Manual Read  -     POCT Influenza A/B Molecular  -     benzonatate (TESSALON) 200 MG capsule; Take 1 capsule (200 mg total) by mouth 3 (three) times daily as needed for Cough.  Dispense: 30 capsule; Refill: 0    2. Acute bronchitis, unspecified organism  -     benzonatate (TESSALON) 200 MG capsule; Take 1 capsule (200 mg total) by mouth 3 (three) times daily as needed for Cough.  Dispense: 30 capsule; Refill: 0  -     doxycycline (VIBRAMYCIN) 100 MG Cap; Take 1 capsule (100 mg total) by mouth every 12 (twelve) hours. for 10 days  Dispense: 20 capsule; Refill: 0  -     albuterol (VENTOLIN HFA) 90 mcg/actuation inhaler; Inhale 2 puffs into the lungs every 6 (six) hours as needed for Wheezing. Rescue  Dispense: 18 g; Refill: 1     If not better call us ; may need CXR   Office flu test and covid are negative

## 2023-01-31 ENCOUNTER — EXTERNAL CHRONIC CARE MANAGEMENT (OUTPATIENT)
Dept: PRIMARY CARE CLINIC | Facility: CLINIC | Age: 88
End: 2023-01-31
Payer: MEDICARE

## 2023-01-31 PROCEDURE — 99490 PR CHRONIC CARE MGMT, 1ST 20 MIN: ICD-10-PCS | Mod: S$PBB,,, | Performed by: INTERNAL MEDICINE

## 2023-01-31 PROCEDURE — 99999 PR STA SHADOW: CPT | Mod: PBBFAC,,, | Performed by: INTERNAL MEDICINE

## 2023-01-31 PROCEDURE — 99999 PR STA SHADOW: ICD-10-PCS | Mod: PBBFAC,,, | Performed by: INTERNAL MEDICINE

## 2023-01-31 PROCEDURE — 99490 CHRNC CARE MGMT STAFF 1ST 20: CPT | Mod: PBBFAC | Performed by: INTERNAL MEDICINE

## 2023-01-31 PROCEDURE — 99490 CHRNC CARE MGMT STAFF 1ST 20: CPT | Mod: S$PBB,,, | Performed by: INTERNAL MEDICINE

## 2023-02-28 ENCOUNTER — EXTERNAL CHRONIC CARE MANAGEMENT (OUTPATIENT)
Dept: PRIMARY CARE CLINIC | Facility: CLINIC | Age: 88
End: 2023-02-28
Payer: MEDICARE

## 2023-02-28 PROCEDURE — 99439 CHRNC CARE MGMT STAF EA ADDL: CPT | Mod: S$PBB,,, | Performed by: INTERNAL MEDICINE

## 2023-02-28 PROCEDURE — 99490 CHRNC CARE MGMT STAFF 1ST 20: CPT | Mod: PBBFAC | Performed by: INTERNAL MEDICINE

## 2023-02-28 PROCEDURE — 99439 PR CHRONIC CARE MGMT, EA ADDTL 20 MIN: ICD-10-PCS | Mod: S$PBB,,, | Performed by: INTERNAL MEDICINE

## 2023-02-28 PROCEDURE — 99490 CHRNC CARE MGMT STAFF 1ST 20: CPT | Mod: S$PBB,,, | Performed by: INTERNAL MEDICINE

## 2023-02-28 PROCEDURE — 99490 PR CHRONIC CARE MGMT, 1ST 20 MIN: ICD-10-PCS | Mod: S$PBB,,, | Performed by: INTERNAL MEDICINE

## 2023-02-28 PROCEDURE — 99439 CHRNC CARE MGMT STAF EA ADDL: CPT | Mod: PBBFAC,27 | Performed by: INTERNAL MEDICINE

## 2023-03-31 ENCOUNTER — EXTERNAL CHRONIC CARE MANAGEMENT (OUTPATIENT)
Dept: PRIMARY CARE CLINIC | Facility: CLINIC | Age: 88
End: 2023-03-31
Payer: MEDICARE

## 2023-03-31 PROCEDURE — 99490 CHRNC CARE MGMT STAFF 1ST 20: CPT | Mod: PBBFAC | Performed by: INTERNAL MEDICINE

## 2023-03-31 PROCEDURE — 99490 CHRNC CARE MGMT STAFF 1ST 20: CPT | Mod: S$PBB,,, | Performed by: INTERNAL MEDICINE

## 2023-03-31 PROCEDURE — 99490 PR CHRONIC CARE MGMT, 1ST 20 MIN: ICD-10-PCS | Mod: S$PBB,,, | Performed by: INTERNAL MEDICINE

## 2023-04-30 ENCOUNTER — EXTERNAL CHRONIC CARE MANAGEMENT (OUTPATIENT)
Dept: PRIMARY CARE CLINIC | Facility: CLINIC | Age: 88
End: 2023-04-30
Payer: MEDICARE

## 2023-04-30 PROCEDURE — 99490 CHRNC CARE MGMT STAFF 1ST 20: CPT | Mod: PBBFAC | Performed by: INTERNAL MEDICINE

## 2023-04-30 PROCEDURE — 99490 CHRNC CARE MGMT STAFF 1ST 20: CPT | Mod: S$PBB,,, | Performed by: INTERNAL MEDICINE

## 2023-04-30 PROCEDURE — 99490 PR CHRONIC CARE MGMT, 1ST 20 MIN: ICD-10-PCS | Mod: S$PBB,,, | Performed by: INTERNAL MEDICINE

## 2023-05-12 ENCOUNTER — TELEPHONE (OUTPATIENT)
Dept: INTERNAL MEDICINE | Facility: CLINIC | Age: 88
End: 2023-05-12
Payer: COMMERCIAL

## 2023-05-12 NOTE — TELEPHONE ENCOUNTER
Call received today:  Kamilla Butler MD; LJ DUNCAN Staff  Phone Number: [phone]     MR# [id number]   Pt: Alexandra Gleason   Phone # [phone]   FYI- Spoke to patient for monthly care coordination call and pt reports recently having SOB. Pt stated this is happening almost daily. Reports she has noticed SOB while up doing household chores or walking around. Pt reports SOB resolves when she sits and relaxes. Just wanted to let you know. Please advise.   Thanks   CLARK Kohli Care Coordinator   [facility] [phone]     Spoke to patient and offered appt with Dr. Butler for evaluation of SOB. Patient would like to be seen next week in the morning. Appt scheduled for 5/16/23 @ 10:45.

## 2023-05-16 ENCOUNTER — OFFICE VISIT (OUTPATIENT)
Dept: INTERNAL MEDICINE | Facility: CLINIC | Age: 88
End: 2023-05-16
Payer: MEDICARE

## 2023-05-16 ENCOUNTER — HOSPITAL ENCOUNTER (OUTPATIENT)
Dept: RADIOLOGY | Facility: HOSPITAL | Age: 88
Discharge: HOME | End: 2023-05-16
Attending: INTERNAL MEDICINE
Payer: MEDICARE

## 2023-05-16 VITALS
RESPIRATION RATE: 16 BRPM | WEIGHT: 119.69 LBS | SYSTOLIC BLOOD PRESSURE: 132 MMHG | HEART RATE: 64 BPM | BODY MASS INDEX: 23.5 KG/M2 | DIASTOLIC BLOOD PRESSURE: 70 MMHG | OXYGEN SATURATION: 99 % | HEIGHT: 60 IN

## 2023-05-16 DIAGNOSIS — F41.9 ANXIETY: ICD-10-CM

## 2023-05-16 DIAGNOSIS — R06.09 DOE (DYSPNEA ON EXERTION): ICD-10-CM

## 2023-05-16 DIAGNOSIS — R06.09 DOE (DYSPNEA ON EXERTION): Primary | ICD-10-CM

## 2023-05-16 PROCEDURE — 99213 OFFICE O/P EST LOW 20 MIN: CPT | Mod: PBBFAC,25 | Performed by: INTERNAL MEDICINE

## 2023-05-16 PROCEDURE — 71046 XR CHEST PA AND LATERAL: ICD-10-PCS | Mod: 26,,, | Performed by: RADIOLOGY

## 2023-05-16 PROCEDURE — 71046 X-RAY EXAM CHEST 2 VIEWS: CPT | Mod: 26,,, | Performed by: RADIOLOGY

## 2023-05-16 PROCEDURE — 99999 PR PBB SHADOW E&M-EST. PATIENT-LVL III: CPT | Mod: PBBFAC,,, | Performed by: INTERNAL MEDICINE

## 2023-05-16 PROCEDURE — 99999 PR PBB SHADOW E&M-EST. PATIENT-LVL III: ICD-10-PCS | Mod: PBBFAC,,, | Performed by: INTERNAL MEDICINE

## 2023-05-16 PROCEDURE — 99999 PR STA SHADOW: CPT | Mod: PBBFAC,,, | Performed by: INTERNAL MEDICINE

## 2023-05-16 PROCEDURE — 71046 X-RAY EXAM CHEST 2 VIEWS: CPT | Mod: TC

## 2023-05-16 PROCEDURE — 99214 OFFICE O/P EST MOD 30 MIN: CPT | Mod: S$PBB | Performed by: INTERNAL MEDICINE

## 2023-05-16 RX ORDER — LISINOPRIL 10 MG/1
TABLET ORAL
COMMUNITY
Start: 2023-02-10

## 2023-05-16 RX ORDER — SERTRALINE HYDROCHLORIDE 25 MG/1
25 TABLET, FILM COATED ORAL DAILY
Qty: 30 TABLET | Refills: 11 | Status: SHIPPED | OUTPATIENT
Start: 2023-05-16 | End: 2024-03-03

## 2023-05-16 NOTE — PROGRESS NOTES
"Subjective:       Patient ID: Alexandra Gleason is a 89 y.o. female.    Chief Complaint: Shortness of Breath and Dizziness (Pt here for sob and dizziness X 1 mth. )      HPI:  Patient is known to me and presents to discuss feeling SOB. She reports she thinks she is having panic attacks. She has retinal detachmenet and macular degeneration and worried about vision loss. She has h/o skin cancers and sees dermatology and "every time I have something removed it makes me worried" She does report excess worry. Generally feels anxious.     She also reports she feels SOB with exertion. Sx started about 1 month ago. Walks 20 steps to get into her house and over last 1 month she is short of breath and has to rest upon entering her home. Saw Dr. Bhakta since her sx started and was told her heart is normal. Not currently smoking; no h/o smoking.   No appetite changes, no weight loss  No cough, no hemoptysis  No chest pains, palpitations     Past Medical History:   Diagnosis Date    High cholesterol     Hypertension        History reviewed. No pertinent family history.    Social History     Socioeconomic History    Marital status:    Tobacco Use    Smoking status: Never   Substance and Sexual Activity    Alcohol use: No    Drug use: No       Review of Systems   Constitutional:  Negative for activity change, fatigue, fever and unexpected weight change.   HENT:  Negative for congestion, ear pain, hearing loss, rhinorrhea and sore throat.    Eyes:  Negative for pain, redness and visual disturbance.   Respiratory:  Positive for shortness of breath. Negative for cough and wheezing.    Cardiovascular:  Negative for chest pain, palpitations and leg swelling.   Gastrointestinal:  Negative for abdominal pain, constipation, diarrhea, nausea and vomiting.   Genitourinary:  Negative for dysuria, frequency, pelvic pain and urgency.   Musculoskeletal:  Negative for back pain, joint swelling and neck pain.   Skin:  Negative for color " change, rash and wound.   Neurological:  Negative for dizziness, tremors, weakness, light-headedness and headaches.   Psychiatric/Behavioral:  The patient is nervous/anxious.        Objective:      Physical Exam  Vitals reviewed.   Constitutional:       General: She is not in acute distress.     Appearance: She is well-developed.   HENT:      Head: Normocephalic and atraumatic.      Right Ear: External ear normal.      Left Ear: External ear normal.      Nose: Nose normal.   Eyes:      General:         Right eye: No discharge.         Left eye: No discharge.      Extraocular Movements: Extraocular movements intact.      Conjunctiva/sclera: Conjunctivae normal.   Neck:      Thyroid: No thyromegaly.   Cardiovascular:      Rate and Rhythm: Normal rate and regular rhythm.      Heart sounds: No murmur heard.  Pulmonary:      Effort: Pulmonary effort is normal. No respiratory distress.      Breath sounds: Normal breath sounds. No wheezing or rales.   Musculoskeletal:      Right lower leg: No edema.      Left lower leg: No edema.   Skin:     General: Skin is warm and dry.   Neurological:      Mental Status: She is alert and oriented to person, place, and time.      Cranial Nerves: No cranial nerve deficit.   Psychiatric:         Behavior: Behavior normal.         Thought Content: Thought content normal.       Assessment:       1. CARRERO (dyspnea on exertion)    2. Anxiety        Plan:       1. CARRERO (dyspnea on exertion)  New problem  Lungs sound clear on exam  Sats 99% after ambulating from waiting room to exam room  CXR today  Has seen her cardiologist and told heart function normal    -     X-Ray Chest PA And Lateral; Future; Expected date: 05/16/2023    2. Anxiety  New problem  Could be contributing to SOB  Discussed PRN meds but at 89 can cause over sedations/confusion (benzos, hydroxyzine) and she is still very active and doesn't want these side effects  Feels more comfortable trying SSRI  Will trial zoloft 25mg  Side  effects discussed today in detail  Understands may take 4-8 weeks for full effect  -     sertraline (ZOLOFT) 25 MG tablet; Take 1 tablet (25 mg total) by mouth once daily.  Dispense: 30 tablet; Refill: 11       RTC as maria teresa in June for routine and PRN

## 2023-05-31 ENCOUNTER — EXTERNAL CHRONIC CARE MANAGEMENT (OUTPATIENT)
Dept: PRIMARY CARE CLINIC | Facility: CLINIC | Age: 88
End: 2023-05-31
Payer: MEDICARE

## 2023-05-31 PROCEDURE — 99439 CHRNC CARE MGMT STAF EA ADDL: CPT | Mod: PBBFAC,27 | Performed by: INTERNAL MEDICINE

## 2023-05-31 PROCEDURE — 99490 PR CHRONIC CARE MGMT, 1ST 20 MIN: ICD-10-PCS | Mod: S$PBB,,, | Performed by: INTERNAL MEDICINE

## 2023-05-31 PROCEDURE — 99490 CHRNC CARE MGMT STAFF 1ST 20: CPT | Mod: S$PBB,,, | Performed by: INTERNAL MEDICINE

## 2023-05-31 PROCEDURE — 99999 PR STA SHADOW: ICD-10-PCS | Mod: PBBFAC,,, | Performed by: INTERNAL MEDICINE

## 2023-05-31 PROCEDURE — 99999 PR STA SHADOW: CPT | Mod: PBBFAC,,, | Performed by: INTERNAL MEDICINE

## 2023-05-31 PROCEDURE — 99439 PR CHRONIC CARE MGMT, EA ADDTL 20 MIN: ICD-10-PCS | Mod: S$PBB,,, | Performed by: INTERNAL MEDICINE

## 2023-05-31 PROCEDURE — 99490 CHRNC CARE MGMT STAFF 1ST 20: CPT | Mod: PBBFAC | Performed by: INTERNAL MEDICINE

## 2023-05-31 PROCEDURE — 99439 CHRNC CARE MGMT STAF EA ADDL: CPT | Mod: S$PBB,,, | Performed by: INTERNAL MEDICINE

## 2023-06-06 ENCOUNTER — LAB VISIT (OUTPATIENT)
Dept: LAB | Facility: HOSPITAL | Age: 88
End: 2023-06-06
Attending: INTERNAL MEDICINE
Payer: COMMERCIAL

## 2023-06-06 DIAGNOSIS — I25.10 CORONARY ARTERY DISEASE INVOLVING NATIVE CORONARY ARTERY OF NATIVE HEART WITHOUT ANGINA PECTORIS: ICD-10-CM

## 2023-06-06 DIAGNOSIS — I10 PRIMARY HYPERTENSION: ICD-10-CM

## 2023-06-06 DIAGNOSIS — M81.0 AGE-RELATED OSTEOPOROSIS WITHOUT CURRENT PATHOLOGICAL FRACTURE: ICD-10-CM

## 2023-06-06 DIAGNOSIS — N18.31 STAGE 3A CHRONIC KIDNEY DISEASE: ICD-10-CM

## 2023-06-06 DIAGNOSIS — E78.5 HYPERLIPIDEMIA, UNSPECIFIED HYPERLIPIDEMIA TYPE: ICD-10-CM

## 2023-06-06 LAB
ALBUMIN SERPL BCP-MCNC: 4 G/DL (ref 3.5–5.2)
ALP SERPL-CCNC: 63 U/L (ref 55–135)
ALT SERPL W/O P-5'-P-CCNC: 14 U/L (ref 10–44)
ANION GAP SERPL CALC-SCNC: 10 MMOL/L (ref 8–16)
AST SERPL-CCNC: 20 U/L (ref 10–40)
BASOPHILS # BLD AUTO: 0.11 K/UL (ref 0–0.2)
BASOPHILS NFR BLD: 1.4 % (ref 0–1.9)
BILIRUB SERPL-MCNC: 0.7 MG/DL (ref 0.1–1)
BUN SERPL-MCNC: 16 MG/DL (ref 8–23)
CALCIUM SERPL-MCNC: 9.6 MG/DL (ref 8.7–10.5)
CHLORIDE SERPL-SCNC: 106 MMOL/L (ref 95–110)
CHOLEST SERPL-MCNC: 144 MG/DL (ref 120–199)
CHOLEST/HDLC SERPL: 2.5 {RATIO} (ref 2–5)
CO2 SERPL-SCNC: 24 MMOL/L (ref 23–29)
CREAT SERPL-MCNC: 1 MG/DL (ref 0.5–1.4)
DIFFERENTIAL METHOD: NORMAL
EOSINOPHIL # BLD AUTO: 0.2 K/UL (ref 0–0.5)
EOSINOPHIL NFR BLD: 2.3 % (ref 0–8)
ERYTHROCYTE [DISTWIDTH] IN BLOOD BY AUTOMATED COUNT: 13.5 % (ref 11.5–14.5)
EST. GFR  (NO RACE VARIABLE): 54 ML/MIN/1.73 M^2
GLUCOSE SERPL-MCNC: 94 MG/DL (ref 70–110)
HCT VFR BLD AUTO: 39.9 % (ref 37–48.5)
HDLC SERPL-MCNC: 57 MG/DL (ref 40–75)
HDLC SERPL: 39.6 % (ref 20–50)
HGB BLD-MCNC: 13 G/DL (ref 12–16)
IMM GRANULOCYTES # BLD AUTO: 0.02 K/UL (ref 0–0.04)
IMM GRANULOCYTES NFR BLD AUTO: 0.3 % (ref 0–0.5)
LDLC SERPL CALC-MCNC: 75.2 MG/DL (ref 63–159)
LYMPHOCYTES # BLD AUTO: 1.9 K/UL (ref 1–4.8)
LYMPHOCYTES NFR BLD: 24 % (ref 18–48)
MCH RBC QN AUTO: 28.6 PG (ref 27–31)
MCHC RBC AUTO-ENTMCNC: 32.6 G/DL (ref 32–36)
MCV RBC AUTO: 88 FL (ref 82–98)
MONOCYTES # BLD AUTO: 0.7 K/UL (ref 0.3–1)
MONOCYTES NFR BLD: 9.2 % (ref 4–15)
NEUTROPHILS # BLD AUTO: 4.9 K/UL (ref 1.8–7.7)
NEUTROPHILS NFR BLD: 62.8 % (ref 38–73)
NONHDLC SERPL-MCNC: 87 MG/DL
NRBC BLD-RTO: 0 /100 WBC
PLATELET # BLD AUTO: 255 K/UL (ref 150–450)
PMV BLD AUTO: 10.7 FL (ref 9.2–12.9)
POTASSIUM SERPL-SCNC: 4.6 MMOL/L (ref 3.5–5.1)
PROT SERPL-MCNC: 6.8 G/DL (ref 6–8.4)
RBC # BLD AUTO: 4.55 M/UL (ref 4–5.4)
SODIUM SERPL-SCNC: 140 MMOL/L (ref 136–145)
TRIGL SERPL-MCNC: 59 MG/DL (ref 30–150)
TSH SERPL DL<=0.005 MIU/L-ACNC: 2.02 UIU/ML (ref 0.4–4)
WBC # BLD AUTO: 7.84 K/UL (ref 3.9–12.7)

## 2023-06-06 PROCEDURE — 84443 ASSAY THYROID STIM HORMONE: CPT | Performed by: INTERNAL MEDICINE

## 2023-06-06 PROCEDURE — 36415 COLL VENOUS BLD VENIPUNCTURE: CPT | Performed by: INTERNAL MEDICINE

## 2023-06-06 PROCEDURE — 80053 COMPREHEN METABOLIC PANEL: CPT | Performed by: INTERNAL MEDICINE

## 2023-06-06 PROCEDURE — 80061 LIPID PANEL: CPT | Performed by: INTERNAL MEDICINE

## 2023-06-06 PROCEDURE — 85025 COMPLETE CBC W/AUTO DIFF WBC: CPT | Performed by: INTERNAL MEDICINE

## 2023-06-14 ENCOUNTER — OFFICE VISIT (OUTPATIENT)
Dept: INTERNAL MEDICINE | Facility: CLINIC | Age: 88
End: 2023-06-14
Payer: MEDICARE

## 2023-06-14 VITALS
HEART RATE: 60 BPM | RESPIRATION RATE: 16 BRPM | OXYGEN SATURATION: 98 % | DIASTOLIC BLOOD PRESSURE: 56 MMHG | SYSTOLIC BLOOD PRESSURE: 110 MMHG | WEIGHT: 117.31 LBS | HEIGHT: 60 IN | BODY MASS INDEX: 23.03 KG/M2

## 2023-06-14 DIAGNOSIS — G25.2 INTENTION TREMOR: ICD-10-CM

## 2023-06-14 DIAGNOSIS — E78.5 HYPERLIPIDEMIA, UNSPECIFIED HYPERLIPIDEMIA TYPE: ICD-10-CM

## 2023-06-14 DIAGNOSIS — N18.31 STAGE 3A CHRONIC KIDNEY DISEASE: ICD-10-CM

## 2023-06-14 DIAGNOSIS — I25.10 CORONARY ARTERY DISEASE INVOLVING NATIVE CORONARY ARTERY OF NATIVE HEART WITHOUT ANGINA PECTORIS: Primary | ICD-10-CM

## 2023-06-14 DIAGNOSIS — I10 PRIMARY HYPERTENSION: ICD-10-CM

## 2023-06-14 DIAGNOSIS — F41.9 ANXIETY: ICD-10-CM

## 2023-06-14 PROCEDURE — 99213 OFFICE O/P EST LOW 20 MIN: CPT | Mod: PBBFAC | Performed by: INTERNAL MEDICINE

## 2023-06-14 PROCEDURE — 99999 PR STA SHADOW: CPT | Mod: PBBFAC,,, | Performed by: INTERNAL MEDICINE

## 2023-06-14 PROCEDURE — 99999 PR STA SHADOW: ICD-10-PCS | Mod: PBBFAC,,, | Performed by: INTERNAL MEDICINE

## 2023-06-14 PROCEDURE — 99214 OFFICE O/P EST MOD 30 MIN: CPT | Mod: S$PBB | Performed by: INTERNAL MEDICINE

## 2023-06-14 PROCEDURE — 99999 PR PBB SHADOW E&M-EST. PATIENT-LVL III: CPT | Mod: PBBFAC,,, | Performed by: INTERNAL MEDICINE

## 2023-06-14 RX ORDER — PRIMIDONE 50 MG/1
50 TABLET ORAL NIGHTLY
Qty: 90 TABLET | Refills: 1 | Status: SHIPPED | OUTPATIENT
Start: 2023-06-14 | End: 2023-07-10 | Stop reason: SDUPTHER

## 2023-06-14 NOTE — PROGRESS NOTES
"Subjective:       Patient ID: Alexandra Gleason is a 89 y.o. female.    Chief Complaint: Follow-up      HPI:  Patient is known to me and presents for follow up CAD, HTN, HLD and anxiety. Labs from 6/6/23 personally reviewed and discussed with the patient today.      Mr. Hewitt () passed away from advanced dementia in 2020.  She misses him but she is much less "stressed" with not having to care for him everyday.   She was not sleeping well after his death. Melatonin not helpful. Was feeling anxious. STarted zoloft 25mg and rpeorts working well. Denies med sdie effects.      HTN: on lisinopril. Does not check BP at home. Denies chest pains, SOB, LE edema. A bit normal today.     CAD: follows with Dr. Bhakta. No stents or h/o MI, found on angiogram. Denies angian sx. On statin. Tells me had stress test 12/2019.      HLD: on atorvastatin 20mg. Denies medication side effects. LDl at goal      Osteoporosis: DEXA 5/2019. Off fosamax; on Ca and vit D right, Vit D normalized     Back pain: chronic. CT shows chronic degenerative changes from 5/2019. Sx stable today      Today I noticed worsening tremor in hands. Had not noticed on prior exams. She does reports she had had a tremor and it is worsening. She had difficulty drinking from a cup, eating. She had to sip her soup from a cup and can't use a sppon. Handwriting is difficult due to tremor. Reports her mother had a hand tremor as well.     Past Medical History:   Diagnosis Date    High cholesterol     Hypertension        History reviewed. No pertinent family history.    Social History     Socioeconomic History    Marital status:    Tobacco Use    Smoking status: Never   Substance and Sexual Activity    Alcohol use: No    Drug use: No       Review of Systems   Constitutional:  Negative for activity change, fatigue, fever and unexpected weight change.   HENT:  Negative for congestion, ear pain, hearing loss, rhinorrhea and sore throat.    Eyes:  Negative for " pain, redness and visual disturbance.   Respiratory:  Negative for cough, shortness of breath and wheezing.    Cardiovascular:  Negative for chest pain, palpitations and leg swelling.   Gastrointestinal:  Negative for abdominal pain, constipation, diarrhea, nausea and vomiting.   Genitourinary:  Negative for dysuria, frequency, pelvic pain and urgency.   Musculoskeletal:  Positive for back pain (chronic, no acute complaints). Negative for joint swelling and neck pain.   Skin:  Negative for color change, rash and wound.   Neurological:  Positive for tremors. Negative for dizziness, weakness, light-headedness and headaches.       Objective:      Physical Exam  Vitals reviewed.   Constitutional:       General: She is not in acute distress.     Appearance: She is well-developed.   HENT:      Head: Normocephalic and atraumatic.      Right Ear: External ear normal.      Left Ear: External ear normal.      Nose: Nose normal.   Eyes:      General:         Right eye: No discharge.         Left eye: No discharge.      Extraocular Movements: Extraocular movements intact.      Conjunctiva/sclera: Conjunctivae normal.   Neck:      Thyroid: No thyromegaly.   Cardiovascular:      Rate and Rhythm: Normal rate and regular rhythm.      Heart sounds: No murmur heard.  Pulmonary:      Effort: Pulmonary effort is normal. No respiratory distress.      Breath sounds: Normal breath sounds.   Abdominal:      Palpations: Abdomen is soft.      Tenderness: There is no abdominal tenderness.   Skin:     General: Skin is warm and dry.   Neurological:      Mental Status: She is alert and oriented to person, place, and time.      Comments: Intention tremor noted b/l, worse on the left side   Psychiatric:         Behavior: Behavior normal.         Thought Content: Thought content normal.       Assessment:       1. Coronary artery disease involving native coronary artery of native heart without angina pectoris    2. Primary hypertension    3.  Hyperlipidemia, unspecified hyperlipidemia type    4. Stage 3a chronic kidney disease    5. Anxiety    6. Intention tremor        Plan:       1. Coronary artery disease involving native coronary artery of native heart without angina pectoris  Chronic ocntrolled  Cont statin  Sees cardiology who is also following    2. Primary hypertension  Chronic controlled  Continue medications at same dose  Low Na diet  Exercise, weight loss  Check BP and keep log for next visit      3. Hyperlipidemia, unspecified hyperlipidemia type  Chronic controlled  LDL at goal  Cotn statin therapy    4. Stage 3a chronic kidney disease  Chronic stable  Follow labs  Avoid nephrotoxic agents  Stay hydrated    5. Anxiety  Chronic controlled  Cont zoloft same dose    6. Intention tremor  New problem  Avoid propranolol as HR in 60s  Gabapentin a possible choice but with CKD would need to monitor and she is worried about sedation  Trial primidone at night. Side effects discussed  Call me in 2 weeks if not effective  -     primidone (MYSOLINE) 50 MG Tab; Take 1 tablet (50 mg total) by mouth every evening.  Dispense: 90 tablet; Refill: 1       RTC 1 year with labs for routine and 1-2 weeks if tremor not improving or med side effects.

## 2023-06-27 ENCOUNTER — PES CALL (OUTPATIENT)
Dept: ADMINISTRATIVE | Facility: CLINIC | Age: 88
End: 2023-06-27
Payer: COMMERCIAL

## 2023-06-30 ENCOUNTER — EXTERNAL CHRONIC CARE MANAGEMENT (OUTPATIENT)
Dept: PRIMARY CARE CLINIC | Facility: CLINIC | Age: 88
End: 2023-06-30
Payer: MEDICARE

## 2023-06-30 PROCEDURE — 99490 CHRNC CARE MGMT STAFF 1ST 20: CPT | Mod: S$PBB,,, | Performed by: INTERNAL MEDICINE

## 2023-06-30 PROCEDURE — 99490 CHRNC CARE MGMT STAFF 1ST 20: CPT | Mod: PBBFAC | Performed by: INTERNAL MEDICINE

## 2023-06-30 PROCEDURE — 99999 PR STA SHADOW: ICD-10-PCS | Mod: PBBFAC,,, | Performed by: INTERNAL MEDICINE

## 2023-06-30 PROCEDURE — 99490 PR CHRONIC CARE MGMT, 1ST 20 MIN: ICD-10-PCS | Mod: S$PBB,,, | Performed by: INTERNAL MEDICINE

## 2023-06-30 PROCEDURE — 99999 PR STA SHADOW: CPT | Mod: PBBFAC,,, | Performed by: INTERNAL MEDICINE

## 2023-07-10 ENCOUNTER — TELEPHONE (OUTPATIENT)
Dept: INTERNAL MEDICINE | Facility: CLINIC | Age: 88
End: 2023-07-10
Payer: COMMERCIAL

## 2023-07-10 DIAGNOSIS — G25.2 INTENTION TREMOR: ICD-10-CM

## 2023-07-10 RX ORDER — PRIMIDONE 50 MG/1
100 TABLET ORAL NIGHTLY
Qty: 180 TABLET | Refills: 1 | Status: SHIPPED | OUTPATIENT
Start: 2023-07-10 | End: 2023-09-26 | Stop reason: SDUPTHER

## 2023-07-10 NOTE — TELEPHONE ENCOUNTER
----- Message from Jannette Andino sent at 7/10/2023  9:27 AM CDT -----  Contact: pt  Alexandra Gleason  MRN: [id number]  : 1934  PCP: Carla Butler  Home Phone      [phone]  Work Phone      Not on file.  Mobile          [phone]      MESSAGE: pt states she was prescribed a new medication and Dr Butler asked the pt to call back after two weeks of being on it.       [phone]

## 2023-07-10 NOTE — TELEPHONE ENCOUNTER
So glad it made her symptoms better. She can try taking 2 (100mg) and see if that helps her sx even more. If so, and no side effects, can continue at 100mg. I sent new prescription to [facility] so she doesn't run out. If any side effects can just go back to 50mg (1 tab) and let me know.

## 2023-07-10 NOTE — TELEPHONE ENCOUNTER
Patient stated that the new medication helps a lot. Patient stated that she can now write her name, but it still isnt how it should be. She said she is still trembling so she is unsure if you would want to up the dosage or not. Please advise.

## 2023-07-31 ENCOUNTER — EXTERNAL CHRONIC CARE MANAGEMENT (OUTPATIENT)
Dept: PRIMARY CARE CLINIC | Facility: CLINIC | Age: 88
End: 2023-07-31
Payer: MEDICARE

## 2023-07-31 PROCEDURE — 99999 PR STA SHADOW: ICD-10-PCS | Mod: PBBFAC,,, | Performed by: INTERNAL MEDICINE

## 2023-07-31 PROCEDURE — 99490 PR CHRONIC CARE MGMT, 1ST 20 MIN: ICD-10-PCS | Mod: S$PBB,,, | Performed by: INTERNAL MEDICINE

## 2023-07-31 PROCEDURE — 99999 PR STA SHADOW: CPT | Mod: PBBFAC,,, | Performed by: INTERNAL MEDICINE

## 2023-07-31 PROCEDURE — 99490 CHRNC CARE MGMT STAFF 1ST 20: CPT | Mod: PBBFAC | Performed by: INTERNAL MEDICINE

## 2023-07-31 PROCEDURE — 99490 CHRNC CARE MGMT STAFF 1ST 20: CPT | Mod: S$PBB,,, | Performed by: INTERNAL MEDICINE

## 2023-08-08 ENCOUNTER — PES CALL (OUTPATIENT)
Dept: ADMINISTRATIVE | Facility: CLINIC | Age: 88
End: 2023-08-08
Payer: MEDICARE

## 2023-08-31 ENCOUNTER — EXTERNAL CHRONIC CARE MANAGEMENT (OUTPATIENT)
Dept: PRIMARY CARE CLINIC | Facility: CLINIC | Age: 88
End: 2023-08-31
Payer: MEDICARE

## 2023-08-31 PROCEDURE — 99490 PR CHRONIC CARE MGMT, 1ST 20 MIN: ICD-10-PCS | Mod: S$PBB,,, | Performed by: INTERNAL MEDICINE

## 2023-08-31 PROCEDURE — 99490 CHRNC CARE MGMT STAFF 1ST 20: CPT | Mod: PBBFAC | Performed by: INTERNAL MEDICINE

## 2023-08-31 PROCEDURE — 99999 PR STA SHADOW: CPT | Mod: PBBFAC,,, | Performed by: INTERNAL MEDICINE

## 2023-08-31 PROCEDURE — 99490 CHRNC CARE MGMT STAFF 1ST 20: CPT | Mod: S$PBB,,, | Performed by: INTERNAL MEDICINE

## 2023-08-31 PROCEDURE — 99999 PR STA SHADOW: ICD-10-PCS | Mod: PBBFAC,,, | Performed by: INTERNAL MEDICINE

## 2023-09-15 ENCOUNTER — TELEPHONE (OUTPATIENT)
Dept: INTERNAL MEDICINE | Facility: CLINIC | Age: 88
End: 2023-09-15
Payer: MEDICARE

## 2023-09-15 RX ORDER — DIPHENOXYLATE HYDROCHLORIDE AND ATROPINE SULFATE 2.5; .025 MG/1; MG/1
1 TABLET ORAL 4 TIMES DAILY PRN
Qty: 30 TABLET | Refills: 0 | Status: SHIPPED | OUTPATIENT
Start: 2023-09-15 | End: 2023-09-25

## 2023-09-15 NOTE — TELEPHONE ENCOUNTER
Lomotil sent. However, if this persists she needs to be worked up. She could have COVID if she has any other respiratory sx or another viral/bacterial illness that needs addressing. Drink plenty of water and pedialyte to avoid dehydration.

## 2023-09-15 NOTE — TELEPHONE ENCOUNTER
Pt is calling c/o diarrhea for the past 3 days. She has tried Pepto and Imodium with no relief. Please advise.     Pharmacy [facility]

## 2023-09-26 DIAGNOSIS — G25.2 INTENTION TREMOR: ICD-10-CM

## 2023-09-26 RX ORDER — PRIMIDONE 50 MG/1
100 TABLET ORAL NIGHTLY
Qty: 180 TABLET | Refills: 1 | Status: SHIPPED | OUTPATIENT
Start: 2023-09-26 | End: 2024-02-21

## 2023-09-30 ENCOUNTER — EXTERNAL CHRONIC CARE MANAGEMENT (OUTPATIENT)
Dept: PRIMARY CARE CLINIC | Facility: CLINIC | Age: 88
End: 2023-09-30
Payer: MEDICARE

## 2023-09-30 PROCEDURE — 99439 PR CHRONIC CARE MGMT, EA ADDTL 20 MIN: ICD-10-PCS | Mod: S$GLB,,, | Performed by: INTERNAL MEDICINE

## 2023-09-30 PROCEDURE — 99490 PR CHRONIC CARE MGMT, 1ST 20 MIN: ICD-10-PCS | Mod: S$GLB,,, | Performed by: INTERNAL MEDICINE

## 2023-09-30 PROCEDURE — 99439 CHRNC CARE MGMT STAF EA ADDL: CPT | Mod: S$GLB,,, | Performed by: INTERNAL MEDICINE

## 2023-09-30 PROCEDURE — 99490 CHRNC CARE MGMT STAFF 1ST 20: CPT | Mod: S$GLB,,, | Performed by: INTERNAL MEDICINE

## 2023-10-31 ENCOUNTER — EXTERNAL CHRONIC CARE MANAGEMENT (OUTPATIENT)
Dept: PRIMARY CARE CLINIC | Facility: CLINIC | Age: 88
End: 2023-10-31
Payer: MEDICARE

## 2023-10-31 PROCEDURE — 99490 PR CHRONIC CARE MGMT, 1ST 20 MIN: ICD-10-PCS | Mod: S$GLB,,, | Performed by: INTERNAL MEDICINE

## 2023-10-31 PROCEDURE — 99439 PR CHRONIC CARE MGMT, EA ADDTL 20 MIN: ICD-10-PCS | Mod: S$GLB,,, | Performed by: INTERNAL MEDICINE

## 2023-10-31 PROCEDURE — 99490 CHRNC CARE MGMT STAFF 1ST 20: CPT | Mod: S$GLB,,, | Performed by: INTERNAL MEDICINE

## 2023-10-31 PROCEDURE — 99439 CHRNC CARE MGMT STAF EA ADDL: CPT | Mod: S$GLB,,, | Performed by: INTERNAL MEDICINE

## 2023-11-03 ENCOUNTER — PATIENT OUTREACH (OUTPATIENT)
Dept: ADMINISTRATIVE | Facility: HOSPITAL | Age: 88
End: 2023-11-03
Payer: MEDICARE

## 2023-11-03 NOTE — PROGRESS NOTES
Chart reviewed, immunization record updated.  No new results noted on [facility] or [employer] web site.  Care Everywhere updated.   Patient care coordination note updated.   LOV with PCP 06/14/2023.  Contacted patient to discuss Enhanced Annual Wellness Visit.   Patient states she will speak with her children who provide her transportation and will call back to schedule.

## 2024-02-01 ENCOUNTER — TELEPHONE (OUTPATIENT)
Dept: INTERNAL MEDICINE | Facility: CLINIC | Age: 89
End: 2024-02-01
Payer: MEDICARE

## 2024-02-01 DIAGNOSIS — I10 PRIMARY HYPERTENSION: Primary | ICD-10-CM

## 2024-02-01 DIAGNOSIS — E78.2 MIXED HYPERLIPIDEMIA: ICD-10-CM

## 2024-02-01 NOTE — TELEPHONE ENCOUNTER
----- Message from Jannette Andino sent at 2024  3:21 PM CST -----  Contact: pt  Alexandra Gleason  MRN: [id number]  : 1934  PCP: Carla Butler  Home Phone      [phone]  Work Phone      Not on file.  Mobile          [phone]      MESSAGE: Pt scheduled her yearly checkup.Please put her lab orders in

## 2024-02-21 DIAGNOSIS — G25.2 INTENTION TREMOR: ICD-10-CM

## 2024-02-21 RX ORDER — PRIMIDONE 50 MG/1
100 TABLET ORAL NIGHTLY
Qty: 180 TABLET | Refills: 3 | Status: SHIPPED | OUTPATIENT
Start: 2024-02-21

## 2024-03-03 DIAGNOSIS — F41.9 ANXIETY: ICD-10-CM

## 2024-03-03 RX ORDER — SERTRALINE HYDROCHLORIDE 25 MG/1
25 TABLET, FILM COATED ORAL
Qty: 90 TABLET | Refills: 1 | Status: ON HOLD | OUTPATIENT
Start: 2024-03-03 | End: 2024-05-20

## 2024-03-03 NOTE — TELEPHONE ENCOUNTER
Refill Decision Note   Alexandra Gleason  is requesting a refill authorization.  Brief Assessment and Rationale for Refill:  Approve     Medication Therapy Plan:         Comments:     Note composed:9:18 AM 03/03/2024

## 2024-03-03 NOTE — TELEPHONE ENCOUNTER
No care due was identified.  [facility] Embedded Care Due Messages. Reference number: [id number].   3/03/2024 2:20:34 AM CST

## 2024-05-01 ENCOUNTER — LAB VISIT (OUTPATIENT)
Dept: LAB | Facility: HOSPITAL | Age: 89
End: 2024-05-01
Attending: INTERNAL MEDICINE
Payer: MEDICARE

## 2024-05-01 DIAGNOSIS — I10 PRIMARY HYPERTENSION: ICD-10-CM

## 2024-05-01 DIAGNOSIS — E78.2 MIXED HYPERLIPIDEMIA: ICD-10-CM

## 2024-05-01 LAB
ALBUMIN SERPL BCP-MCNC: 3.8 G/DL (ref 3.5–5.2)
ALP SERPL-CCNC: 57 U/L (ref 55–135)
ALT SERPL W/O P-5'-P-CCNC: 16 U/L (ref 10–44)
ANION GAP SERPL CALC-SCNC: 8 MMOL/L (ref 8–16)
AST SERPL-CCNC: 22 U/L (ref 10–40)
BASOPHILS # BLD AUTO: 0.1 K/UL (ref 0–0.2)
BASOPHILS NFR BLD: 1.4 % (ref 0–1.9)
BILIRUB SERPL-MCNC: 0.3 MG/DL (ref 0.1–1)
BUN SERPL-MCNC: 13 MG/DL (ref 8–23)
CALCIUM SERPL-MCNC: 9.3 MG/DL (ref 8.7–10.5)
CHLORIDE SERPL-SCNC: 108 MMOL/L (ref 95–110)
CHOLEST SERPL-MCNC: 145 MG/DL (ref 120–199)
CHOLEST/HDLC SERPL: 2.2 {RATIO} (ref 2–5)
CO2 SERPL-SCNC: 24 MMOL/L (ref 23–29)
CREAT SERPL-MCNC: 0.9 MG/DL (ref 0.5–1.4)
DIFFERENTIAL METHOD BLD: NORMAL
EOSINOPHIL # BLD AUTO: 0.2 K/UL (ref 0–0.5)
EOSINOPHIL NFR BLD: 2.9 % (ref 0–8)
ERYTHROCYTE [DISTWIDTH] IN BLOOD BY AUTOMATED COUNT: 13.6 % (ref 11.5–14.5)
EST. GFR  (NO RACE VARIABLE): >60 ML/MIN/1.73 M^2
GLUCOSE SERPL-MCNC: 89 MG/DL (ref 70–110)
HCT VFR BLD AUTO: 38.1 % (ref 37–48.5)
HDLC SERPL-MCNC: 65 MG/DL (ref 40–75)
HDLC SERPL: 44.8 % (ref 20–50)
HGB BLD-MCNC: 12.7 G/DL (ref 12–16)
IMM GRANULOCYTES # BLD AUTO: 0.02 K/UL (ref 0–0.04)
IMM GRANULOCYTES NFR BLD AUTO: 0.3 % (ref 0–0.5)
LDLC SERPL CALC-MCNC: 69.4 MG/DL (ref 63–159)
LYMPHOCYTES # BLD AUTO: 2.2 K/UL (ref 1–4.8)
LYMPHOCYTES NFR BLD: 31.3 % (ref 18–48)
MCH RBC QN AUTO: 29.1 PG (ref 27–31)
MCHC RBC AUTO-ENTMCNC: 33.3 G/DL (ref 32–36)
MCV RBC AUTO: 87 FL (ref 82–98)
MONOCYTES # BLD AUTO: 0.7 K/UL (ref 0.3–1)
MONOCYTES NFR BLD: 9.7 % (ref 4–15)
NEUTROPHILS # BLD AUTO: 3.8 K/UL (ref 1.8–7.7)
NEUTROPHILS NFR BLD: 54.4 % (ref 38–73)
NONHDLC SERPL-MCNC: 80 MG/DL
NRBC BLD-RTO: 0 /100 WBC
PLATELET # BLD AUTO: 250 K/UL (ref 150–450)
PMV BLD AUTO: 10.7 FL (ref 9.2–12.9)
POTASSIUM SERPL-SCNC: 4.5 MMOL/L (ref 3.5–5.1)
PROT SERPL-MCNC: 6.1 G/DL (ref 6–8.4)
RBC # BLD AUTO: 4.37 M/UL (ref 4–5.4)
SODIUM SERPL-SCNC: 140 MMOL/L (ref 136–145)
TRIGL SERPL-MCNC: 53 MG/DL (ref 30–150)
TSH SERPL DL<=0.005 MIU/L-ACNC: 2.88 UIU/ML (ref 0.4–4)
WBC # BLD AUTO: 6.94 K/UL (ref 3.9–12.7)

## 2024-05-01 PROCEDURE — 80061 LIPID PANEL: CPT | Mod: HCNC | Performed by: INTERNAL MEDICINE

## 2024-05-01 PROCEDURE — 80053 COMPREHEN METABOLIC PANEL: CPT | Mod: HCNC | Performed by: INTERNAL MEDICINE

## 2024-05-01 PROCEDURE — 36415 COLL VENOUS BLD VENIPUNCTURE: CPT | Mod: HCNC | Performed by: INTERNAL MEDICINE

## 2024-05-01 PROCEDURE — 84443 ASSAY THYROID STIM HORMONE: CPT | Mod: HCNC | Performed by: INTERNAL MEDICINE

## 2024-05-01 PROCEDURE — 85025 COMPLETE CBC W/AUTO DIFF WBC: CPT | Mod: HCNC | Performed by: INTERNAL MEDICINE

## 2024-05-08 ENCOUNTER — OFFICE VISIT (OUTPATIENT)
Dept: INTERNAL MEDICINE | Facility: CLINIC | Age: 89
End: 2024-05-08
Payer: MEDICARE

## 2024-05-08 VITALS
HEART RATE: 60 BPM | BODY MASS INDEX: 22.16 KG/M2 | HEIGHT: 60 IN | WEIGHT: 112.88 LBS | RESPIRATION RATE: 14 BRPM | DIASTOLIC BLOOD PRESSURE: 74 MMHG | OXYGEN SATURATION: 99 % | SYSTOLIC BLOOD PRESSURE: 118 MMHG

## 2024-05-08 DIAGNOSIS — K52.9 CHRONIC DIARRHEA: Primary | ICD-10-CM

## 2024-05-08 DIAGNOSIS — I25.10 CORONARY ARTERY DISEASE INVOLVING NATIVE CORONARY ARTERY OF NATIVE HEART WITHOUT ANGINA PECTORIS: ICD-10-CM

## 2024-05-08 DIAGNOSIS — N18.31 STAGE 3A CHRONIC KIDNEY DISEASE: ICD-10-CM

## 2024-05-08 DIAGNOSIS — E78.5 HYPERLIPIDEMIA, UNSPECIFIED HYPERLIPIDEMIA TYPE: ICD-10-CM

## 2024-05-08 DIAGNOSIS — F41.9 ANXIETY: ICD-10-CM

## 2024-05-08 DIAGNOSIS — M81.0 AGE-RELATED OSTEOPOROSIS WITHOUT CURRENT PATHOLOGICAL FRACTURE: ICD-10-CM

## 2024-05-08 DIAGNOSIS — I10 PRIMARY HYPERTENSION: ICD-10-CM

## 2024-05-08 PROCEDURE — 1160F RVW MEDS BY RX/DR IN RCRD: CPT | Mod: HCNC,CPTII,S$GLB, | Performed by: INTERNAL MEDICINE

## 2024-05-08 PROCEDURE — 1101F PT FALLS ASSESS-DOCD LE1/YR: CPT | Mod: HCNC,CPTII,S$GLB, | Performed by: INTERNAL MEDICINE

## 2024-05-08 PROCEDURE — 1159F MED LIST DOCD IN RCRD: CPT | Mod: HCNC,CPTII,S$GLB, | Performed by: INTERNAL MEDICINE

## 2024-05-08 PROCEDURE — 1126F AMNT PAIN NOTED NONE PRSNT: CPT | Mod: HCNC,CPTII,S$GLB, | Performed by: INTERNAL MEDICINE

## 2024-05-08 PROCEDURE — 99214 OFFICE O/P EST MOD 30 MIN: CPT | Mod: HCNC,S$GLB,, | Performed by: INTERNAL MEDICINE

## 2024-05-08 PROCEDURE — G2211 COMPLEX E/M VISIT ADD ON: HCPCS | Mod: HCNC,S$GLB,, | Performed by: INTERNAL MEDICINE

## 2024-05-08 PROCEDURE — 99999 PR PBB SHADOW E&M-EST. PATIENT-LVL III: CPT | Mod: PBBFAC,HCNC,, | Performed by: INTERNAL MEDICINE

## 2024-05-08 PROCEDURE — 3288F FALL RISK ASSESSMENT DOCD: CPT | Mod: HCNC,CPTII,S$GLB, | Performed by: INTERNAL MEDICINE

## 2024-05-08 RX ORDER — CHOLESTYRAMINE LIGHT 4 G/5.7G
4 POWDER, FOR SUSPENSION ORAL DAILY
Qty: 231 G | Refills: 0 | Status: ON HOLD | OUTPATIENT
Start: 2024-05-08 | End: 2024-05-20

## 2024-05-08 NOTE — PROGRESS NOTES
"Subjective:       Patient ID: Alexandra Gleason is a 89 y.o. female.    Chief Complaint: Annual Exam (Pt states no complaints )      HPI:  Patient is known to me and presents for follow up CAD, HTN, HLD and anxiety. Labs from 5/1/24 personally reviewed and discussed with the patient today.     LDL 69, normal  GFR > 60, normal (improved)  Plt 250 normal    Mr. Hewitt () passed away from advanced dementia in 2020.  She misses him but she is much less "stressed" with not having to care for him everyday.   She was not sleeping well after his death. Melatonin not helpful. Was feeling anxious. STarted zoloft 25mg and reports working well. Denies med sdie effects.      HTN: on lisinopril. Does not check BP at home. Denies chest pains, SOB, LE edema. A bit normal today.     CAD: follows with Dr. Bhakta. No stents or h/o MI, found on angiogram. Denies angian sx. On statin. Tells me had stress test 12/2019.      HLD: on atorvastatin 20mg. Denies medication side effects. LDl at goal      Osteoporosis: DEXA 5/2019. Off fosamax; on Ca and vit D right, Vit D normalized     Back pain: chronic. CT shows chronic degenerative changes from 5/2019. Sx stable today      Intention tremor:. From prior visit: "She does reports she had had a tremor and it is worsening. She had difficulty drinking from a cup, eating. She had to sip her soup from a cup and can't use a sppon. Handwriting is difficult due to tremor. Reports her mother had a hand tremor as well."  Primidone started and sx are much better. She can write and drink again. No med side effects.    She is having chronic diarrhea. Has had sx off an on since age 40 when her allbladder was removed. No BRPBR. No abd pain. Asking for something to help control. Imodium not helpful.        Past Medical History:   Diagnosis Date    High cholesterol     Hypertension        No family history on file.    Social History     Socioeconomic History    Marital status:    Tobacco Use    " Smoking status: Never   Substance and Sexual Activity    Alcohol use: No    Drug use: No       Review of Systems   Constitutional:  Negative for activity change, fatigue, fever and unexpected weight change.   HENT:  Negative for congestion, ear pain, hearing loss, rhinorrhea and sore throat.    Eyes:  Negative for redness and visual disturbance.   Respiratory:  Negative for cough, shortness of breath and wheezing.    Cardiovascular:  Negative for chest pain, palpitations and leg swelling.   Gastrointestinal:  Positive for diarrhea. Negative for abdominal pain, blood in stool, constipation, nausea and vomiting.   Genitourinary:  Negative for dysuria, frequency and urgency.   Musculoskeletal:  Negative for back pain, joint swelling and neck pain.   Skin:  Negative for color change, rash and wound.   Neurological:  Positive for tremors (much iproved). Negative for dizziness, weakness, light-headedness and headaches.         Objective:      Physical Exam  Vitals reviewed.   Constitutional:       General: She is not in acute distress.     Appearance: She is well-developed.   HENT:      Head: Normocephalic and atraumatic.      Right Ear: External ear normal.      Left Ear: External ear normal.      Nose: Nose normal.   Eyes:      General:         Right eye: No discharge.         Left eye: No discharge.      Conjunctiva/sclera: Conjunctivae normal.   Neck:      Thyroid: No thyromegaly.   Cardiovascular:      Rate and Rhythm: Normal rate and regular rhythm.      Heart sounds: No murmur heard.  Pulmonary:      Effort: Pulmonary effort is normal. No respiratory distress.      Breath sounds: Normal breath sounds. No wheezing.   Skin:     General: Skin is warm and dry.   Neurological:      Mental Status: She is alert and oriented to person, place, and time.   Psychiatric:         Behavior: Behavior normal.         Thought Content: Thought content normal.         Assessment:       1. Chronic diarrhea    2. Stage 3a chronic  kidney disease    3. Primary hypertension    4. Hyperlipidemia, unspecified hyperlipidemia type    5. Coronary artery disease involving native coronary artery of native heart without angina pectoris    6. Anxiety    7. Age-related osteoporosis without current pathological fracture        Plan:       1. Chronic diarrhea  Chronic uncontrolled  Irritable bowel vs post cholecystectomy  Trial of cholestyramine  Immodium no longer effective  Probiotic, fiber for stool bulking  Stay hydrated  -     cholestyramine-aspartame (CHOLESTYRAMINE LIGHT) 4 gram Powd; Take 4 g by mouth once daily.  Dispense: 231 g; Refill: 0    2. Stage 3a chronic kidney disease  Chronic improving  GFR > 60 today but keeping diagnosis as this is only one time reading and other were 40-50s  Avoid nephrotoxic agents  Stay hydrted  Follow labs    3. Primary hypertension  Chronic controlled  Continue medications at same dose  Low Na diet  Exercise, weight loss  Check BP and keep log for next visit      4. Hyperlipidemia, unspecified hyperlipidemia type  Chronic controlled  Cont statin same dose    5. Coronary artery disease involving native coronary artery of native heart without angina pectoris  Chronic stable  Cont statin  Cont cards eval as planned  Denies angina sx    6. Anxiety  Chronic controlled  Cont zoloft same dose    7. Age-related osteoporosis without current pathological fracture  Chronic stable  Cont Ca and Vit D       RTC 1 year with labs and PRN. cardiology also following

## 2024-05-17 ENCOUNTER — HOSPITAL ENCOUNTER (INPATIENT)
Facility: HOSPITAL | Age: 89
LOS: 3 days | Discharge: ANOTHER HOSPITAL | DRG: 388 | End: 2024-05-20
Attending: FAMILY MEDICINE | Admitting: FAMILY MEDICINE
Payer: MEDICARE

## 2024-05-17 DIAGNOSIS — I49.9 CARDIAC RHYTHM DISORDER OR DISTURBANCE OR CHANGE: ICD-10-CM

## 2024-05-17 DIAGNOSIS — K56.609 SMALL BOWEL OBSTRUCTION: ICD-10-CM

## 2024-05-17 DIAGNOSIS — K58.0 IRRITABLE BOWEL SYNDROME WITH DIARRHEA: ICD-10-CM

## 2024-05-17 DIAGNOSIS — Z01.89 ENCOUNTER FOR IMAGING STUDY TO CONFIRM NASOGASTRIC (NG) TUBE PLACEMENT: ICD-10-CM

## 2024-05-17 DIAGNOSIS — R07.9 CHEST PAIN: ICD-10-CM

## 2024-05-17 DIAGNOSIS — I25.10 CORONARY ARTERY DISEASE INVOLVING NATIVE CORONARY ARTERY OF NATIVE HEART WITHOUT ANGINA PECTORIS: Primary | ICD-10-CM

## 2024-05-17 DIAGNOSIS — I10 PRIMARY HYPERTENSION: ICD-10-CM

## 2024-05-17 DIAGNOSIS — F41.9 ANXIETY: ICD-10-CM

## 2024-05-17 DIAGNOSIS — E78.2 MIXED HYPERLIPIDEMIA: ICD-10-CM

## 2024-05-17 DIAGNOSIS — N18.31 STAGE 3A CHRONIC KIDNEY DISEASE: ICD-10-CM

## 2024-05-17 LAB
ALBUMIN SERPL BCP-MCNC: 4.4 G/DL (ref 3.5–5.2)
ALP SERPL-CCNC: 66 U/L (ref 55–135)
ALT SERPL W/O P-5'-P-CCNC: 16 U/L (ref 10–44)
ANION GAP SERPL CALC-SCNC: 20 MMOL/L (ref 8–16)
AST SERPL-CCNC: 22 U/L (ref 10–40)
BACTERIA #/AREA URNS HPF: NORMAL /HPF
BASOPHILS # BLD AUTO: 0.02 K/UL (ref 0–0.2)
BASOPHILS NFR BLD: 0.2 % (ref 0–1.9)
BILIRUB SERPL-MCNC: 0.9 MG/DL (ref 0.1–1)
BILIRUB UR QL STRIP: ABNORMAL
BUN SERPL-MCNC: 41 MG/DL (ref 8–23)
CALCIUM SERPL-MCNC: 10.1 MG/DL (ref 8.7–10.5)
CHLORIDE SERPL-SCNC: 97 MMOL/L (ref 95–110)
CLARITY UR: CLEAR
CO2 SERPL-SCNC: 24 MMOL/L (ref 23–29)
COLOR UR: YELLOW
CREAT SERPL-MCNC: 1.3 MG/DL (ref 0.5–1.4)
DIFFERENTIAL METHOD BLD: ABNORMAL
EOSINOPHIL # BLD AUTO: 0 K/UL (ref 0–0.5)
EOSINOPHIL NFR BLD: 0 % (ref 0–8)
ERYTHROCYTE [DISTWIDTH] IN BLOOD BY AUTOMATED COUNT: 13.9 % (ref 11.5–14.5)
EST. GFR  (NO RACE VARIABLE): 39 ML/MIN/1.73 M^2
GLUCOSE SERPL-MCNC: 127 MG/DL (ref 70–110)
GLUCOSE UR QL STRIP: NEGATIVE
HCT VFR BLD AUTO: 45.7 % (ref 37–48.5)
HGB BLD-MCNC: 15.6 G/DL (ref 12–16)
HGB UR QL STRIP: NEGATIVE
HYALINE CASTS #/AREA URNS LPF: 1 /LPF
IMM GRANULOCYTES # BLD AUTO: 0.02 K/UL (ref 0–0.04)
IMM GRANULOCYTES NFR BLD AUTO: 0.2 % (ref 0–0.5)
INFLUENZA A, MOLECULAR: NEGATIVE
INFLUENZA B, MOLECULAR: NEGATIVE
KETONES UR QL STRIP: ABNORMAL
LEUKOCYTE ESTERASE UR QL STRIP: NEGATIVE
LIPASE SERPL-CCNC: 13 U/L (ref 4–60)
LYMPHOCYTES # BLD AUTO: 1.1 K/UL (ref 1–4.8)
LYMPHOCYTES NFR BLD: 11.7 % (ref 18–48)
MCH RBC QN AUTO: 29 PG (ref 27–31)
MCHC RBC AUTO-ENTMCNC: 34.1 G/DL (ref 32–36)
MCV RBC AUTO: 85 FL (ref 82–98)
MICROSCOPIC COMMENT: NORMAL
MONOCYTES # BLD AUTO: 1.2 K/UL (ref 0.3–1)
MONOCYTES NFR BLD: 12.8 % (ref 4–15)
NEUTROPHILS # BLD AUTO: 6.9 K/UL (ref 1.8–7.7)
NEUTROPHILS NFR BLD: 75.1 % (ref 38–73)
NITRITE UR QL STRIP: NEGATIVE
NRBC BLD-RTO: 0 /100 WBC
PH UR STRIP: 6 [PH] (ref 5–8)
PLATELET # BLD AUTO: 305 K/UL (ref 150–450)
PMV BLD AUTO: 10.8 FL (ref 9.2–12.9)
POTASSIUM SERPL-SCNC: 3.3 MMOL/L (ref 3.5–5.1)
PROT SERPL-MCNC: 7.5 G/DL (ref 6–8.4)
PROT UR QL STRIP: ABNORMAL
RBC # BLD AUTO: 5.38 M/UL (ref 4–5.4)
RBC #/AREA URNS HPF: 1 /HPF (ref 0–4)
SARS-COV-2 RDRP RESP QL NAA+PROBE: NEGATIVE
SODIUM SERPL-SCNC: 141 MMOL/L (ref 136–145)
SP GR UR STRIP: >=1.03 (ref 1–1.03)
SPECIMEN SOURCE: NORMAL
URN SPEC COLLECT METH UR: ABNORMAL
UROBILINOGEN UR STRIP-ACNC: NEGATIVE EU/DL
WBC # BLD AUTO: 9.16 K/UL (ref 3.9–12.7)
WBC #/AREA URNS HPF: 1 /HPF (ref 0–5)

## 2024-05-17 PROCEDURE — 80053 COMPREHEN METABOLIC PANEL: CPT | Mod: HCNC | Performed by: FAMILY MEDICINE

## 2024-05-17 PROCEDURE — 63600175 PHARM REV CODE 636 W HCPCS: Mod: HCNC | Performed by: FAMILY MEDICINE

## 2024-05-17 PROCEDURE — 99222 1ST HOSP IP/OBS MODERATE 55: CPT | Mod: HCNC,,, | Performed by: SURGERY

## 2024-05-17 PROCEDURE — 99285 EMERGENCY DEPT VISIT HI MDM: CPT | Mod: 25,HCNC

## 2024-05-17 PROCEDURE — 0D9670Z DRAINAGE OF STOMACH WITH DRAINAGE DEVICE, VIA NATURAL OR ARTIFICIAL OPENING: ICD-10-PCS | Performed by: FAMILY MEDICINE

## 2024-05-17 PROCEDURE — 25000003 PHARM REV CODE 250: Mod: HCNC | Performed by: FAMILY MEDICINE

## 2024-05-17 PROCEDURE — 99900035 HC TECH TIME PER 15 MIN (STAT): Mod: HCNC

## 2024-05-17 PROCEDURE — 11000001 HC ACUTE MED/SURG PRIVATE ROOM: Mod: HCNC

## 2024-05-17 PROCEDURE — 85025 COMPLETE CBC W/AUTO DIFF WBC: CPT | Mod: HCNC | Performed by: FAMILY MEDICINE

## 2024-05-17 PROCEDURE — 96361 HYDRATE IV INFUSION ADD-ON: CPT

## 2024-05-17 PROCEDURE — 99223 1ST HOSP IP/OBS HIGH 75: CPT | Mod: AI,HCNC,, | Performed by: FAMILY MEDICINE

## 2024-05-17 PROCEDURE — 94760 N-INVAS EAR/PLS OXIMETRY 1: CPT | Mod: HCNC

## 2024-05-17 PROCEDURE — 25000003 PHARM REV CODE 250: Mod: HCNC

## 2024-05-17 PROCEDURE — 81000 URINALYSIS NONAUTO W/SCOPE: CPT | Mod: HCNC | Performed by: FAMILY MEDICINE

## 2024-05-17 PROCEDURE — 96374 THER/PROPH/DIAG INJ IV PUSH: CPT | Mod: 59

## 2024-05-17 PROCEDURE — 94761 N-INVAS EAR/PLS OXIMETRY MLT: CPT | Mod: HCNC

## 2024-05-17 PROCEDURE — U0002 COVID-19 LAB TEST NON-CDC: HCPCS | Mod: HCNC | Performed by: FAMILY MEDICINE

## 2024-05-17 PROCEDURE — 87502 INFLUENZA DNA AMP PROBE: CPT | Mod: HCNC | Performed by: FAMILY MEDICINE

## 2024-05-17 PROCEDURE — 83690 ASSAY OF LIPASE: CPT | Mod: HCNC | Performed by: FAMILY MEDICINE

## 2024-05-17 PROCEDURE — 96376 TX/PRO/DX INJ SAME DRUG ADON: CPT

## 2024-05-17 RX ORDER — DEXTROSE MONOHYDRATE, SODIUM CHLORIDE, AND POTASSIUM CHLORIDE 50; 1.49; 4.5 G/1000ML; G/1000ML; G/1000ML
INJECTION, SOLUTION INTRAVENOUS CONTINUOUS
Status: DISCONTINUED | OUTPATIENT
Start: 2024-05-17 | End: 2024-05-18

## 2024-05-17 RX ORDER — SODIUM CHLORIDE 0.9 % (FLUSH) 0.9 %
10 SYRINGE (ML) INJECTION
Status: DISCONTINUED | OUTPATIENT
Start: 2024-05-17 | End: 2024-05-20 | Stop reason: HOSPADM

## 2024-05-17 RX ORDER — ONDANSETRON HYDROCHLORIDE 4 MG/5ML
4 SOLUTION ORAL EVERY 6 HOURS PRN
Status: DISCONTINUED | OUTPATIENT
Start: 2024-05-17 | End: 2024-05-18

## 2024-05-17 RX ORDER — TALC
6 POWDER (GRAM) TOPICAL NIGHTLY PRN
Status: DISCONTINUED | OUTPATIENT
Start: 2024-05-17 | End: 2024-05-20 | Stop reason: HOSPADM

## 2024-05-17 RX ORDER — SODIUM CHLORIDE 9 MG/ML
INJECTION, SOLUTION INTRAVENOUS CONTINUOUS
Status: DISCONTINUED | OUTPATIENT
Start: 2024-05-17 | End: 2024-05-17

## 2024-05-17 RX ORDER — SODIUM CHLORIDE 9 MG/ML
1000 INJECTION, SOLUTION INTRAVENOUS
Status: COMPLETED | OUTPATIENT
Start: 2024-05-17 | End: 2024-05-17

## 2024-05-17 RX ORDER — HYDROMORPHONE HYDROCHLORIDE 1 MG/ML
0.5 INJECTION, SOLUTION INTRAMUSCULAR; INTRAVENOUS; SUBCUTANEOUS EVERY 6 HOURS PRN
Status: DISCONTINUED | OUTPATIENT
Start: 2024-05-17 | End: 2024-05-20 | Stop reason: HOSPADM

## 2024-05-17 RX ADMIN — TOPICAL ANESTHETIC 1 EACH: 200 SPRAY DENTAL; PERIODONTAL at 01:05

## 2024-05-17 RX ADMIN — DEXTROSE, SODIUM CHLORIDE, AND POTASSIUM CHLORIDE: 5; .45; .15 INJECTION INTRAVENOUS at 03:05

## 2024-05-17 RX ADMIN — HYDROMORPHONE HYDROCHLORIDE 0.5 MG: 1 INJECTION, SOLUTION INTRAMUSCULAR; INTRAVENOUS; SUBCUTANEOUS at 10:05

## 2024-05-17 RX ADMIN — SODIUM CHLORIDE 1000 ML: 0.9 INJECTION, SOLUTION INTRAVENOUS at 11:05

## 2024-05-17 RX ADMIN — HYDROMORPHONE HYDROCHLORIDE 0.5 MG: 1 INJECTION, SOLUTION INTRAMUSCULAR; INTRAVENOUS; SUBCUTANEOUS at 04:05

## 2024-05-17 NOTE — H&P
[facility] ([address] [place] [address] Medicine  History & Physical    Patient Name: Alexandra Gleason  MRN: [id number]  Patient Class: IP- Inpatient  Admission Date: 5/17/2024  Attending Physician: Lebron Olivares MD   Primary Care Provider: Carla Butler MD         Patient information was obtained from patient, relative(s), and ER records.     Subjective:     Principal Problem:Small bowel obstruction    Chief Complaint:   Chief Complaint   Patient presents with    Vomiting    Abdominal Pain        HPI: 89 yo WF admitted for SBO.  She has a 3 day history of lower abd pain, bloating, diarrhea, nausea and vomiting.  Workup revealed SBO with transition zone on CT of Abd.  Being admitted for surgical consultation, NGT suction, IVF's, NPO.    Past Medical History:   Diagnosis Date    High cholesterol     Hypertension        Past Surgical History:   Procedure Laterality Date    BACK SURGERY      CHOLECYSTECTOMY      HYSTERECTOMY      NECK SURGERY         Review of patient's allergies indicates:   Allergen Reactions    Iodine and iodide containing products        No current facility-administered medications on file prior to encounter.     Current Outpatient Medications on File Prior to Encounter   Medication Sig    atorvastatin (LIPITOR) 20 MG tablet Take 20 mg by mouth once daily.    cholecalciferol, vitamin D3, (VITAMIN D3) 25 mcg (1,000 unit) capsule Take 1 capsule (1,000 Units total) by mouth once daily.    cholestyramine-aspartame (CHOLESTYRAMINE LIGHT) 4 gram Powd Take 4 g by mouth once daily.    lisinopriL 10 MG tablet lisinopriL 10 mg tablet, [RxNorm: 415807]    primidone (MYSOLINE) 50 MG Tab TAKE 2 TABLETS EVERY EVENING    sertraline (ZOLOFT) 25 MG tablet TAKE 1 TABLET ONE TIME DAILY     Family History    None       Tobacco Use    Smoking status: Never    Smokeless tobacco: Not on file   Substance and Sexual Activity    Alcohol use: No    Drug use: No    Sexual activity: Not on file     Review of Systems  "  Constitutional:  Positive for appetite change. Negative for fever and unexpected weight change.   HENT:  Negative for congestion and rhinorrhea.    Eyes:  Negative for visual disturbance.   Respiratory:  Negative for cough, chest tightness, shortness of breath and wheezing.    Cardiovascular:  Negative for chest pain, palpitations and leg swelling.   Gastrointestinal:  Positive for abdominal distention, abdominal pain, diarrhea, nausea and vomiting. Negative for anal bleeding, blood in stool, constipation and rectal pain.   Genitourinary:  Negative for difficulty urinating and dysuria.   Musculoskeletal:  Negative for arthralgias, back pain, gait problem and joint swelling.   Skin:  Negative for rash.   Neurological:  Negative for dizziness, seizures, weakness, numbness and headaches.   Hematological:  Negative for adenopathy.   Psychiatric/Behavioral:  Negative for behavioral problems and sleep disturbance. The patient is not nervous/anxious.      Objective:     Vital Signs (Most Recent):  Temp: 98.3 °F (36.8 °C) (05/17/24 0848)  Pulse: 71 (05/17/24 1415)  Resp: 20 (05/17/24 1415)  BP: 136/62 (05/17/24 1415)  SpO2: 100 % (05/17/24 1415) Vital Signs (24h Range):  Temp:  [98.3 °F (36.8 °C)] 98.3 °F (36.8 °C)  Pulse:  [71-83] 71  Resp:  [18-20] 20  SpO2:  [97 %-100 %] 100 %  BP: (136-188)/(62-74) 136/62     Weight: 48.1 kg (106 lb 2.4 oz)  Body mass index is 20.73 kg/m².     Physical Exam           Significant Labs: All pertinent labs within the past 24 hours have been reviewed.  Blood Culture: No results for input(s): "LABBLOO" in the last 48 hours.  CBC:   Recent Labs   Lab 05/17/24  1117   WBC 9.16   HGB 15.6   HCT 45.7        CMP:   Recent Labs   Lab 05/17/24  1117      K 3.3*   CL 97   CO2 24   *   BUN 41*   CREATININE 1.3   CALCIUM 10.1   PROT 7.5   ALBUMIN 4.4   BILITOT 0.9   ALKPHOS 66   AST 22   ALT 16   ANIONGAP 20*     Lactic Acid: No results for input(s): "LACTATE" in the last 48 " "hours.  Lipase:   Recent Labs   Lab 05/17/24  1117   LIPASE 13     Urine Culture: No results for input(s): "LABURIN" in the last 48 hours.  Urine Studies:   Recent Labs   Lab 05/17/24  1106   COLORU Yellow   APPEARANCEUA Clear   PHUR 6.0   SPECGRAV >=1.030*   PROTEINUA 2+*   GLUCUA Negative   KETONESU 1+*   BILIRUBINUA 2+*   OCCULTUA Negative   NITRITE Negative   UROBILINOGEN Negative   LEUKOCYTESUR Negative   RBCUA 1   WBCUA 1   BACTERIA Occasional   HYALINECASTS 1       Significant Imaging: I have reviewed all pertinent imaging results/findings within the past 24 hours.  I have reviewed and interpreted all pertinent imaging results/findings within the past 24 hours.  Assessment/Plan:     * Small bowel obstruction  Consult general surgery  NGT suction overnight  IVF's  Check labs and abd xray in the morning      Irritable bowel syndrome with diarrhea  Hold cholestyramine for now.          Stage 3a chronic kidney disease  Creatine stable for now. BMP reviewed- noted Estimated Creatinine Clearance: 20.7 mL/min (based on SCr of 1.3 mg/dL). according to latest data. Based on current GFR, CKD stage is stage 3 - GFR 30-59.  Monitor UOP and serial BMP and adjust therapy as needed. Renally dose meds. Avoid nephrotoxic medications and procedures.    Coronary artery disease involving native coronary artery of native heart without angina pectoris  Patient with known CAD, which is controlled Will continue ASA and monitor for S/Sx of angina/ACS. Continue to monitor on telemetry.   Monitor for any chest pain  Hold ASA for now    Anxiety  She takes Zoloft 25 mg daily.  Will hold for now.      Hyperlipidemia  Hold Statin for now      HTN (hypertension)  Chronic, controlled. Latest blood pressure and vitals reviewed-     Temp:  [98.2 °F (36.8 °C)-98.3 °F (36.8 °C)]   Pulse:  [64-83]   Resp:  [16-20]   BP: (136-188)/(62-74)   SpO2:  [97 %-100 %] .   Home meds for hypertension were reviewed and noted below.   Hypertension Medications "               lisinopriL 10 MG tablet lisinopriL 10 mg tablet, [RxNorm: 157054]            While in the hospital, will manage blood pressure as follows; Adjust home antihypertensive regimen as follows- hold lisinopril for now    Will utilize p.r.n. blood pressure medication only if patient's blood pressure greater than 180/110 and she develops symptoms such as worsening chest pain or shortness of breath.      VTE Risk Mitigation (From admission, onward)           Ordered     IP VTE HIGH RISK PATIENT  Once         05/17/24 1258     Place sequential compression device  Until discontinued         05/17/24 1258                                    Lebron Olivares MD  Department of Hospital Medicine  [facility] (3rd Fl)

## 2024-05-17 NOTE — SUBJECTIVE & OBJECTIVE
Past Medical History:   Diagnosis Date    High cholesterol     Hypertension        Past Surgical History:   Procedure Laterality Date    BACK SURGERY      CHOLECYSTECTOMY      HYSTERECTOMY      NECK SURGERY         Review of patient's allergies indicates:   Allergen Reactions    Iodine and iodide containing products        No current facility-administered medications on file prior to encounter.     Current Outpatient Medications on File Prior to Encounter   Medication Sig    atorvastatin (LIPITOR) 20 MG tablet Take 20 mg by mouth once daily.    cholecalciferol, vitamin D3, (VITAMIN D3) 25 mcg (1,000 unit) capsule Take 1 capsule (1,000 Units total) by mouth once daily.    cholestyramine-aspartame (CHOLESTYRAMINE LIGHT) 4 gram Powd Take 4 g by mouth once daily.    lisinopriL 10 MG tablet lisinopriL 10 mg tablet, [RxNorm: 895584]    primidone (MYSOLINE) 50 MG Tab TAKE 2 TABLETS EVERY EVENING    sertraline (ZOLOFT) 25 MG tablet TAKE 1 TABLET ONE TIME DAILY     Family History    None       Tobacco Use    Smoking status: Never    Smokeless tobacco: Not on file   Substance and Sexual Activity    Alcohol use: No    Drug use: No    Sexual activity: Not on file     Review of Systems   Constitutional:  Positive for appetite change. Negative for fever and unexpected weight change.   HENT:  Negative for congestion and rhinorrhea.    Eyes:  Negative for visual disturbance.   Respiratory:  Negative for cough, chest tightness, shortness of breath and wheezing.    Cardiovascular:  Negative for chest pain, palpitations and leg swelling.   Gastrointestinal:  Positive for abdominal distention, abdominal pain, diarrhea, nausea and vomiting. Negative for anal bleeding, blood in stool, constipation and rectal pain.   Genitourinary:  Negative for difficulty urinating and dysuria.   Musculoskeletal:  Negative for arthralgias, back pain, gait problem and joint swelling.   Skin:  Negative for rash.   Neurological:  Negative for dizziness,  "seizures, weakness, numbness and headaches.   Hematological:  Negative for adenopathy.   Psychiatric/Behavioral:  Negative for behavioral problems and sleep disturbance. The patient is not nervous/anxious.      Objective:     Vital Signs (Most Recent):  Temp: 98.3 °F (36.8 °C) (05/17/24 0848)  Pulse: 71 (05/17/24 1415)  Resp: 20 (05/17/24 1415)  BP: 136/62 (05/17/24 1415)  SpO2: 100 % (05/17/24 1415) Vital Signs (24h Range):  Temp:  [98.3 °F (36.8 °C)] 98.3 °F (36.8 °C)  Pulse:  [71-83] 71  Resp:  [18-20] 20  SpO2:  [97 %-100 %] 100 %  BP: (136-188)/(62-74) 136/62     Weight: 48.1 kg (106 lb 2.4 oz)  Body mass index is 20.73 kg/m².     Physical Exam           Significant Labs: All pertinent labs within the past 24 hours have been reviewed.  Blood Culture: No results for input(s): "LABBLOO" in the last 48 hours.  CBC:   Recent Labs   Lab 05/17/24  1117   WBC 9.16   HGB 15.6   HCT 45.7        CMP:   Recent Labs   Lab 05/17/24  1117      K 3.3*   CL 97   CO2 24   *   BUN 41*   CREATININE 1.3   CALCIUM 10.1   PROT 7.5   ALBUMIN 4.4   BILITOT 0.9   ALKPHOS 66   AST 22   ALT 16   ANIONGAP 20*     Lactic Acid: No results for input(s): "LACTATE" in the last 48 hours.  Lipase:   Recent Labs   Lab 05/17/24  1117   LIPASE 13     Urine Culture: No results for input(s): "LABURIN" in the last 48 hours.  Urine Studies:   Recent Labs   Lab 05/17/24  1106   COLORU Yellow   APPEARANCEUA Clear   PHUR 6.0   SPECGRAV >=1.030*   PROTEINUA 2+*   GLUCUA Negative   KETONESU 1+*   BILIRUBINUA 2+*   OCCULTUA Negative   NITRITE Negative   UROBILINOGEN Negative   LEUKOCYTESUR Negative   RBCUA 1   WBCUA 1   BACTERIA Occasional   HYALINECASTS 1       Significant Imaging: I have reviewed all pertinent imaging results/findings within the past 24 hours.  I have reviewed and interpreted all pertinent imaging results/findings within the past 24 hours.  "

## 2024-05-17 NOTE — ASSESSMENT & PLAN NOTE
Chronic, controlled. Latest blood pressure and vitals reviewed-     Temp:  [98.2 °F (36.8 °C)-98.3 °F (36.8 °C)]   Pulse:  [64-83]   Resp:  [16-20]   BP: (136-188)/(62-74)   SpO2:  [97 %-100 %] .   Home meds for hypertension were reviewed and noted below.   Hypertension Medications               lisinopriL 10 MG tablet lisinopriL 10 mg tablet, [RxNorm: 042583]            While in the hospital, will manage blood pressure as follows; Adjust home antihypertensive regimen as follows- hold lisinopril for now    Will utilize p.r.n. blood pressure medication only if patient's blood pressure greater than 180/110 and she develops symptoms such as worsening chest pain or shortness of breath.

## 2024-05-17 NOTE — PLAN OF CARE
Problem: Adult Inpatient Plan of Care  Goal: Absence of Hospital-Acquired Illness or Injury  Outcome: Progressing     Problem: Adult Inpatient Plan of Care  Goal: Optimal Comfort and Wellbeing  Outcome: Progressing     Problem: Fall Injury Risk  Goal: Absence of Fall and Fall-Related Injury  Outcome: Progressing     Problem: Pain Acute  Goal: Optimal Pain Control and Function  Outcome: Progressing     Problem: Adult Inpatient Plan of Care  Goal: Plan of Care Review  Outcome: Progressing

## 2024-05-17 NOTE — HPI
89 yo WF admitted for SBO.  She has a 3 day history of lower abd pain, bloating, diarrhea, nausea and vomiting.  Workup revealed SBO with transition zone on CT of Abd.  Being admitted for surgical consultation, NGT suction, IVF's, NPO.

## 2024-05-17 NOTE — ED TRIAGE NOTES
C/o vomiting since yesterday. Patient reports did have diarrhea, but now resolved. Patient with c/o lower abdominal pain.

## 2024-05-17 NOTE — ASSESSMENT & PLAN NOTE
Patient with known CAD, which is controlled Will continue ASA and monitor for S/Sx of angina/ACS. Continue to monitor on telemetry.   Monitor for any chest pain  Hold ASA for now

## 2024-05-17 NOTE — ASSESSMENT & PLAN NOTE
Creatine stable for now. BMP reviewed- noted Estimated Creatinine Clearance: 20.7 mL/min (based on SCr of 1.3 mg/dL). according to latest data. Based on current GFR, CKD stage is stage 3 - GFR 30-59.  Monitor UOP and serial BMP and adjust therapy as needed. Renally dose meds. Avoid nephrotoxic medications and procedures.

## 2024-05-17 NOTE — CONSULTS
[facility] Surg (3rd Fl)  General Surgery  Consult Note    Consults  Subjective:     Chief Complaint/Reason for Admission:     Nausea vomiting abdominal pain  History of Present Illness:    89-year-old female who was admitted earlier today for possible small-bowel obstruction.  Patient states that he started with loose stools several weeks ago.  She reported multiple loose stools daily which eventually turned to bloody stools.  She says that stopped last week but then she developed vomiting earlier in the week.  She reports multiple episodes of vomiting over the past few days.  She does not report any significant pain.  She denies fever   Or chills.  She denies dysuria hematuria.  She does report passing flatus.  Past surgical history significant for open cholecystectomy.  Hysterectomy.    No current facility-administered medications on file prior to encounter.     Current Outpatient Medications on File Prior to Encounter   Medication Sig    atorvastatin (LIPITOR) 20 MG tablet Take 20 mg by mouth once daily.    cholecalciferol, vitamin D3, (VITAMIN D3) 25 mcg (1,000 unit) capsule Take 1 capsule (1,000 Units total) by mouth once daily.    cholestyramine-aspartame (CHOLESTYRAMINE LIGHT) 4 gram Powd Take 4 g by mouth once daily.    lisinopriL 10 MG tablet lisinopriL 10 mg tablet, [RxNorm: 324724]    primidone (MYSOLINE) 50 MG Tab TAKE 2 TABLETS EVERY EVENING    sertraline (ZOLOFT) 25 MG tablet TAKE 1 TABLET ONE TIME DAILY       Review of patient's allergies indicates:   Allergen Reactions    Iodine and iodide containing products        Past Medical History:   Diagnosis Date    High cholesterol     Hypertension      Past Surgical History:   Procedure Laterality Date    BACK SURGERY      CHOLECYSTECTOMY      HYSTERECTOMY      NECK SURGERY       Family History    None       Tobacco Use    Smoking status: Never    Smokeless tobacco: Not on file   Substance and Sexual Activity    Alcohol use: No    Drug use: No     Sexual activity: Not on file     Review of Systems  Objective:     Vital Signs (Most Recent):  Temp: 98.2 °F (36.8 °C) (05/17/24 1536)  Pulse: 62 (05/17/24 1548)  Resp: 16 (05/17/24 1536)  BP: (!) 147/67 (05/17/24 1536)  SpO2: 98 % (05/17/24 1536) Vital Signs (24h Range):  Temp:  [98.2 °F (36.8 °C)-98.3 °F (36.8 °C)] 98.2 °F (36.8 °C)  Pulse:  [62-83] 62  Resp:  [16-20] 16  SpO2:  [97 %-100 %] 98 %  BP: (136-188)/(62-74) 147/67     Weight: 50.9 kg (112 lb 4.8 oz)  Body mass index is 23.47 kg/m².    No intake or output data in the 24 hours ending 05/17/24 1609    Physical Exam    Patient is sitting up in bed.  NG tube in place.  No significant output.     Patient tachypneic   Abdomen is soft.  Abdomen nondistended.  No hernias palpated.  No significant tenderness.  Significant Labs:  All pertinent labs from the last 24 hours have been reviewed.    Significant Diagnostics:  I have reviewed all pertinent imaging results/findings within the past 24 hours.    Assessment/Plan:     Active Diagnoses:    Diagnosis Date Noted POA    PRINCIPAL PROBLEM:  Small bowel obstruction [K56.609] 05/17/2024 Yes    Irritable bowel syndrome with diarrhea [K58.0] 12/13/2022 Yes    Stage 3a chronic kidney disease [N18.31] 05/18/2021 Yes    Anxiety [F41.9] 04/05/2019 Yes    Coronary artery disease involving native coronary artery of native heart without angina pectoris [I25.10] 04/05/2019 Yes    HTN (hypertension) [I10] 08/12/2018 Yes    Hyperlipidemia [E78.5] 08/12/2018 Yes      Problems Resolved During this Admission:    Questionable partial bowel obstruction versus enteritis.  Continue bowel rest and NG tube.  Continue IV fluids.  Will re-evaluate abdomen and repeat abdominal films tomorrow.  No indications for urgent emergent surgery.    Thank you for your consult.       Louie Betts MD  General Surgery  [place] - [facility] Surg (3rd Fl)

## 2024-05-17 NOTE — Clinical Note
Diagnosis: Small bowel obstruction [348339]   Future Attending Provider: GORDO MCHUGH [[id number]]   Reason for IP Medical Treatment  (Clinical interventions that can only be accomplished in the IP setting? ) :: IVF, NGT,   I certify that Inpatient services for greater than or equal to 2 midnights are medically necessary:: Yes   Plans for Post-Acute care--if anticipated (pick the single best option):: A. No post acute care anticipated at this time   Special Needs:: Fall Risk [15]   Attestation for [place] only:: I hereby certify that the beneficiary may reasonably be expected to be discharged or transferred to a hospital within 96 hrs after admission to [facility] and the beneficiarys stay is not expected to be over 96 hours.

## 2024-05-18 LAB
ALBUMIN SERPL BCP-MCNC: 3.4 G/DL (ref 3.5–5.2)
ALP SERPL-CCNC: 54 U/L (ref 55–135)
ALT SERPL W/O P-5'-P-CCNC: 10 U/L (ref 10–44)
ANION GAP SERPL CALC-SCNC: 8 MMOL/L (ref 8–16)
AST SERPL-CCNC: 21 U/L (ref 10–40)
BASOPHILS # BLD AUTO: 0.03 K/UL (ref 0–0.2)
BASOPHILS NFR BLD: 0.3 % (ref 0–1.9)
BILIRUB SERPL-MCNC: 0.4 MG/DL (ref 0.1–1)
BUN SERPL-MCNC: 31 MG/DL (ref 8–23)
CALCIUM SERPL-MCNC: 9 MG/DL (ref 8.7–10.5)
CHLORIDE SERPL-SCNC: 104 MMOL/L (ref 95–110)
CO2 SERPL-SCNC: 29 MMOL/L (ref 23–29)
CREAT SERPL-MCNC: 0.9 MG/DL (ref 0.5–1.4)
DIFFERENTIAL METHOD BLD: ABNORMAL
EOSINOPHIL # BLD AUTO: 0 K/UL (ref 0–0.5)
EOSINOPHIL NFR BLD: 0.1 % (ref 0–8)
ERYTHROCYTE [DISTWIDTH] IN BLOOD BY AUTOMATED COUNT: 14.3 % (ref 11.5–14.5)
EST. GFR  (NO RACE VARIABLE): >60 ML/MIN/1.73 M^2
GLUCOSE SERPL-MCNC: 151 MG/DL (ref 70–110)
HCT VFR BLD AUTO: 38.8 % (ref 37–48.5)
HGB BLD-MCNC: 12.8 G/DL (ref 12–16)
IMM GRANULOCYTES # BLD AUTO: 0.05 K/UL (ref 0–0.04)
IMM GRANULOCYTES NFR BLD AUTO: 0.5 % (ref 0–0.5)
LYMPHOCYTES # BLD AUTO: 1.1 K/UL (ref 1–4.8)
LYMPHOCYTES NFR BLD: 11.7 % (ref 18–48)
MCH RBC QN AUTO: 29 PG (ref 27–31)
MCHC RBC AUTO-ENTMCNC: 33 G/DL (ref 32–36)
MCV RBC AUTO: 88 FL (ref 82–98)
MONOCYTES # BLD AUTO: 1.3 K/UL (ref 0.3–1)
MONOCYTES NFR BLD: 13.5 % (ref 4–15)
NEUTROPHILS # BLD AUTO: 7 K/UL (ref 1.8–7.7)
NEUTROPHILS NFR BLD: 73.9 % (ref 38–73)
NRBC BLD-RTO: 0 /100 WBC
PLATELET # BLD AUTO: 243 K/UL (ref 150–450)
PMV BLD AUTO: 11.1 FL (ref 9.2–12.9)
POTASSIUM SERPL-SCNC: 3.4 MMOL/L (ref 3.5–5.1)
PROT SERPL-MCNC: 6.2 G/DL (ref 6–8.4)
RBC # BLD AUTO: 4.41 M/UL (ref 4–5.4)
SODIUM SERPL-SCNC: 141 MMOL/L (ref 136–145)
WBC # BLD AUTO: 9.54 K/UL (ref 3.9–12.7)

## 2024-05-18 PROCEDURE — 94761 N-INVAS EAR/PLS OXIMETRY MLT: CPT | Mod: HCNC

## 2024-05-18 PROCEDURE — 99900035 HC TECH TIME PER 15 MIN (STAT): Mod: HCNC

## 2024-05-18 PROCEDURE — 96361 HYDRATE IV INFUSION ADD-ON: CPT

## 2024-05-18 PROCEDURE — 96376 TX/PRO/DX INJ SAME DRUG ADON: CPT

## 2024-05-18 PROCEDURE — 85025 COMPLETE CBC W/AUTO DIFF WBC: CPT | Mod: HCNC | Performed by: FAMILY MEDICINE

## 2024-05-18 PROCEDURE — 80053 COMPREHEN METABOLIC PANEL: CPT | Mod: HCNC | Performed by: FAMILY MEDICINE

## 2024-05-18 PROCEDURE — 99231 SBSQ HOSP IP/OBS SF/LOW 25: CPT | Mod: HCNC,,, | Performed by: SURGERY

## 2024-05-18 PROCEDURE — 96375 TX/PRO/DX INJ NEW DRUG ADDON: CPT

## 2024-05-18 PROCEDURE — 36415 COLL VENOUS BLD VENIPUNCTURE: CPT | Mod: HCNC | Performed by: FAMILY MEDICINE

## 2024-05-18 PROCEDURE — 11000001 HC ACUTE MED/SURG PRIVATE ROOM: Mod: HCNC

## 2024-05-18 PROCEDURE — 63600175 PHARM REV CODE 636 W HCPCS: Mod: HCNC | Performed by: FAMILY MEDICINE

## 2024-05-18 PROCEDURE — 99233 SBSQ HOSP IP/OBS HIGH 50: CPT | Mod: HCNC,,, | Performed by: FAMILY MEDICINE

## 2024-05-18 PROCEDURE — 96374 THER/PROPH/DIAG INJ IV PUSH: CPT | Mod: 59

## 2024-05-18 PROCEDURE — 25000003 PHARM REV CODE 250: Mod: HCNC | Performed by: FAMILY MEDICINE

## 2024-05-18 RX ORDER — ONDANSETRON HYDROCHLORIDE 2 MG/ML
4 INJECTION, SOLUTION INTRAVENOUS EVERY 6 HOURS PRN
Status: DISCONTINUED | OUTPATIENT
Start: 2024-05-18 | End: 2024-05-20 | Stop reason: HOSPADM

## 2024-05-18 RX ORDER — HYDROMORPHONE HYDROCHLORIDE 1 MG/ML
0.5 INJECTION, SOLUTION INTRAMUSCULAR; INTRAVENOUS; SUBCUTANEOUS ONCE
Status: COMPLETED | OUTPATIENT
Start: 2024-05-18 | End: 2024-05-18

## 2024-05-18 RX ADMIN — ONDANSETRON 4 MG: 2 INJECTION INTRAMUSCULAR; INTRAVENOUS at 08:05

## 2024-05-18 RX ADMIN — LEUCINE, PHENYLALANINE, LYSINE, METHIONINE, ISOLEUCINE, VALINE, HISTIDINE, THREONINE, TRYPTOPHAN, ALANINE, GLYCINE, ARGININE, PROLINE, SERINE, TYROSINE, SODIUM ACETATE, DIBASIC POTASSIUM PHOSPHATE, MAGNESIUM CHLORIDE, SODIUM CHLORIDE, CALCIUM CHLORIDE, DEXTROSE
311; 238; 247; 170; 255; 247; 204; 179; 77; 880; 438; 489; 289; 213; 17; 297; 261; 51; 77; 33; 5 INJECTION INTRAVENOUS at 10:05

## 2024-05-18 RX ADMIN — HYDROMORPHONE HYDROCHLORIDE 0.5 MG: 1 INJECTION, SOLUTION INTRAMUSCULAR; INTRAVENOUS; SUBCUTANEOUS at 08:05

## 2024-05-18 RX ADMIN — HYDROMORPHONE HYDROCHLORIDE 0.5 MG: 1 INJECTION, SOLUTION INTRAMUSCULAR; INTRAVENOUS; SUBCUTANEOUS at 05:05

## 2024-05-18 RX ADMIN — DEXTROSE, SODIUM CHLORIDE, AND POTASSIUM CHLORIDE: 5; .45; .15 INJECTION INTRAVENOUS at 01:05

## 2024-05-18 NOTE — PROGRESS NOTES
[facility] Surg ([facility])  [facility] Medicine  Progress Note    Patient Name: Alexandra Gleason  MRN: [id number]  Patient Class: IP- Inpatient   Admission Date: 5/17/2024  Length of Stay: 1 days  Attending Physician: Lebron Olivares MD  Primary Care Provider: Carla Butler MD        Subjective:     Principal Problem:Small bowel obstruction        HPI:  89 yo WF admitted for SBO.  She has a 3 day history of lower abd pain, bloating, diarrhea, nausea and vomiting.  Workup revealed SBO with transition zone on CT of Abd.  Being admitted for surgical consultation, NGT suction, IVF's, NPO.    Overview/Hospital Course:  Patient still is not passing gas or stool.  Abdominal x-ray looks unchanged.  NG tube still working well and decompressing her stomach.  Her pain is well controlled using Dilaudid and Zofran.  Surgery is following along.    Past Medical History:   Diagnosis Date    High cholesterol     Hypertension        Past Surgical History:   Procedure Laterality Date    BACK SURGERY      CHOLECYSTECTOMY      HYSTERECTOMY      NECK SURGERY         Review of patient's allergies indicates:   Allergen Reactions    Iodine and iodide containing products        No current facility-administered medications on file prior to encounter.     Current Outpatient Medications on File Prior to Encounter   Medication Sig    atorvastatin (LIPITOR) 20 MG tablet Take 20 mg by mouth once daily.    cholecalciferol, vitamin D3, (VITAMIN D3) 25 mcg (1,000 unit) capsule Take 1 capsule (1,000 Units total) by mouth once daily.    cholestyramine-aspartame (CHOLESTYRAMINE LIGHT) 4 gram Powd Take 4 g by mouth once daily.    lisinopriL 10 MG tablet lisinopriL 10 mg tablet, [RxNorm: 931979]    primidone (MYSOLINE) 50 MG Tab TAKE 2 TABLETS EVERY EVENING    sertraline (ZOLOFT) 25 MG tablet TAKE 1 TABLET ONE TIME DAILY     Family History    None       Tobacco Use    Smoking status: Never    Smokeless tobacco: Not on file   Substance and Sexual  Activity    Alcohol use: No    Drug use: No    Sexual activity: Not on file     Review of Systems   Constitutional:  Positive for appetite change. Negative for fever and unexpected weight change.   HENT:  Negative for congestion and rhinorrhea.    Eyes:  Negative for visual disturbance.   Respiratory:  Negative for cough, chest tightness, shortness of breath and wheezing.    Cardiovascular:  Negative for chest pain, palpitations and leg swelling.   Gastrointestinal:  Positive for abdominal distention, abdominal pain, diarrhea, nausea and vomiting. Negative for anal bleeding, blood in stool, constipation and rectal pain.   Genitourinary:  Negative for difficulty urinating and dysuria.   Musculoskeletal:  Negative for arthralgias, back pain, gait problem and joint swelling.   Skin:  Negative for rash.   Neurological:  Negative for dizziness, seizures, weakness, numbness and headaches.   Hematological:  Negative for adenopathy.   Psychiatric/Behavioral:  Negative for behavioral problems and sleep disturbance. The patient is not nervous/anxious.      Objective:     Vital Signs (Most Recent):  Temp: 97.7 °F (36.5 °C) (05/18/24 1126)  Pulse: (!) 57 (05/18/24 1126)  Resp: 16 (05/18/24 1126)  BP: (!) 127/58 (05/18/24 1126)  SpO2: 98 % (05/18/24 1126) Vital Signs (24h Range):  Temp:  [97.7 °F (36.5 °C)-98.4 °F (36.9 °C)] 97.7 °F (36.5 °C)  Pulse:  [54-74] 57  Resp:  [16-20] 16  SpO2:  [95 %-100 %] 98 %  BP: (127-188)/(58-74) 127/58     Weight: 50.9 kg (112 lb 4.8 oz)  Body mass index is 23.47 kg/m².     Physical Exam  Constitutional:       General: She is not in acute distress.     Appearance: Normal appearance. She is well-developed.   HENT:      Nose: Nose normal.      Comments: NG tube in place     Mouth/Throat:      Mouth: Mucous membranes are dry.   Cardiovascular:      Rate and Rhythm: Normal rate and regular rhythm.      Heart sounds: Normal heart sounds. No murmur heard.  Pulmonary:      Effort: Pulmonary effort is  "normal.      Breath sounds: Normal breath sounds.   Abdominal:      General: Abdomen is flat.      Palpations: Abdomen is soft.      Tenderness: There is no abdominal tenderness.      Comments: No bowel sounds   Musculoskeletal:      Right lower leg: No edema.      Left lower leg: No edema.   Neurological:      General: No focal deficit present.      Mental Status: She is alert and oriented to person, place, and time. Mental status is at baseline.   Psychiatric:         Mood and Affect: Mood normal.                Significant Labs: All pertinent labs within the past 24 hours have been reviewed.  Blood Culture: No results for input(s): "LABBLOO" in the last 48 hours.  CBC:   Recent Labs   Lab 05/17/24  1117 05/18/24  0422   WBC 9.16 9.54   HGB 15.6 12.8   HCT 45.7 38.8    243     CMP:   Recent Labs   Lab 05/17/24  1117 05/18/24  0422    141   K 3.3* 3.4*   CL 97 104   CO2 24 29   * 151*   BUN 41* 31*   CREATININE 1.3 0.9   CALCIUM 10.1 9.0   PROT 7.5 6.2   ALBUMIN 4.4 3.4*   BILITOT 0.9 0.4   ALKPHOS 66 54*   AST 22 21   ALT 16 10   ANIONGAP 20* 8     Lactic Acid: No results for input(s): "LACTATE" in the last 48 hours.  Lipase:   Recent Labs   Lab 05/17/24  1117   LIPASE 13     Urine Culture: No results for input(s): "LABURIN" in the last 48 hours.  Urine Studies:   Recent Labs   Lab 05/17/24  1106   COLORU Yellow   APPEARANCEUA Clear   PHUR 6.0   SPECGRAV >=1.030*   PROTEINUA 2+*   GLUCUA Negative   KETONESU 1+*   BILIRUBINUA 2+*   OCCULTUA Negative   NITRITE Negative   UROBILINOGEN Negative   LEUKOCYTESUR Negative   RBCUA 1   WBCUA 1   BACTERIA Occasional   HYALINECASTS 1       Significant Imaging: I have reviewed all pertinent imaging results/findings within the past 24 hours.  I have reviewed and interpreted all pertinent imaging results/findings within the past 24 hours.  Abdominal x-ray shows NG tube in place, no change in bowel gas.  She does have some gas in the colon.    Assessment/Plan: "      * Small bowel obstruction  Consult general surgery  NGT suction overnight  IVF's  Check labs and abd xray in the morning  Start Clinimix at 100 cc/hour      Irritable bowel syndrome with diarrhea  Hold cholestyramine for now.          Stage 3a chronic kidney disease  Creatine stable for now. BMP reviewed- noted Estimated Creatinine Clearance: 30.9 mL/min (based on SCr of 0.9 mg/dL). according to latest data. Based on current GFR, CKD stage is stage 3 - GFR 30-59.  Monitor UOP and serial BMP and adjust therapy as needed. Renally dose meds. Avoid nephrotoxic medications and procedures.    Coronary artery disease involving native coronary artery of native heart without angina pectoris  Patient with known CAD, which is controlled Will continue ASA and monitor for S/Sx of angina/ACS. Continue to monitor on telemetry.   Monitor for any chest pain  Hold ASA for now    Anxiety  She takes Zoloft 25 mg daily.  Will hold for now.      Hyperlipidemia  Hold Statin for now      HTN (hypertension)  Chronic, controlled. Latest blood pressure and vitals reviewed-     Temp:  [97.7 °F (36.5 °C)-98.4 °F (36.9 °C)]   Pulse:  [54-74]   Resp:  [16-20]   BP: (127-188)/(58-74)   SpO2:  [95 %-100 %] .   Home meds for hypertension were reviewed and noted below.   Hypertension Medications               lisinopriL 10 MG tablet lisinopriL 10 mg tablet, [RxNorm: 433038]            While in the hospital, will manage blood pressure as follows; Adjust home antihypertensive regimen as follows- hold lisinopril for now    Will utilize p.r.n. blood pressure medication only if patient's blood pressure greater than 180/110 and she develops symptoms such as worsening chest pain or shortness of breath.      VTE Risk Mitigation (From admission, onward)           Ordered     IP VTE HIGH RISK PATIENT  Once         05/17/24 1258     Place sequential compression device  Until discontinued         05/17/24 1258                    Discharge Planning   AMADO:       Code Status: Full Code   Is the patient medically ready for discharge?:     Reason for patient still in hospital (select all that apply): Patient trending condition and Treatment                     Lebron Olivares MD  Department of Hospital Medicine   [place] - [facility] Surg ([facility])

## 2024-05-18 NOTE — ASSESSMENT & PLAN NOTE
Consult general surgery  NGT suction overnight  IVF's  Check labs and abd xray in the morning  Start Clinimix at 100 cc/hour

## 2024-05-18 NOTE — ASSESSMENT & PLAN NOTE
Chronic, controlled. Latest blood pressure and vitals reviewed-     Temp:  [97.7 °F (36.5 °C)-98.4 °F (36.9 °C)]   Pulse:  [54-74]   Resp:  [16-20]   BP: (127-188)/(58-74)   SpO2:  [95 %-100 %] .   Home meds for hypertension were reviewed and noted below.   Hypertension Medications               lisinopriL 10 MG tablet lisinopriL 10 mg tablet, [RxNorm: 522171]            While in the hospital, will manage blood pressure as follows; Adjust home antihypertensive regimen as follows- hold lisinopril for now    Will utilize p.r.n. blood pressure medication only if patient's blood pressure greater than 180/110 and she develops symptoms such as worsening chest pain or shortness of breath.

## 2024-05-18 NOTE — SUBJECTIVE & OBJECTIVE
Past Medical History:   Diagnosis Date    High cholesterol     Hypertension        Past Surgical History:   Procedure Laterality Date    BACK SURGERY      CHOLECYSTECTOMY      HYSTERECTOMY      NECK SURGERY         Review of patient's allergies indicates:   Allergen Reactions    Iodine and iodide containing products        No current facility-administered medications on file prior to encounter.     Current Outpatient Medications on File Prior to Encounter   Medication Sig    atorvastatin (LIPITOR) 20 MG tablet Take 20 mg by mouth once daily.    cholecalciferol, vitamin D3, (VITAMIN D3) 25 mcg (1,000 unit) capsule Take 1 capsule (1,000 Units total) by mouth once daily.    cholestyramine-aspartame (CHOLESTYRAMINE LIGHT) 4 gram Powd Take 4 g by mouth once daily.    lisinopriL 10 MG tablet lisinopriL 10 mg tablet, [RxNorm: 269056]    primidone (MYSOLINE) 50 MG Tab TAKE 2 TABLETS EVERY EVENING    sertraline (ZOLOFT) 25 MG tablet TAKE 1 TABLET ONE TIME DAILY     Family History    None       Tobacco Use    Smoking status: Never    Smokeless tobacco: Not on file   Substance and Sexual Activity    Alcohol use: No    Drug use: No    Sexual activity: Not on file     Review of Systems   Constitutional:  Positive for appetite change. Negative for fever and unexpected weight change.   HENT:  Negative for congestion and rhinorrhea.    Eyes:  Negative for visual disturbance.   Respiratory:  Negative for cough, chest tightness, shortness of breath and wheezing.    Cardiovascular:  Negative for chest pain, palpitations and leg swelling.   Gastrointestinal:  Positive for abdominal distention, abdominal pain, diarrhea, nausea and vomiting. Negative for anal bleeding, blood in stool, constipation and rectal pain.   Genitourinary:  Negative for difficulty urinating and dysuria.   Musculoskeletal:  Negative for arthralgias, back pain, gait problem and joint swelling.   Skin:  Negative for rash.   Neurological:  Negative for dizziness,  "seizures, weakness, numbness and headaches.   Hematological:  Negative for adenopathy.   Psychiatric/Behavioral:  Negative for behavioral problems and sleep disturbance. The patient is not nervous/anxious.      Objective:     Vital Signs (Most Recent):  Temp: 97.7 °F (36.5 °C) (05/18/24 1126)  Pulse: (!) 57 (05/18/24 1126)  Resp: 16 (05/18/24 1126)  BP: (!) 127/58 (05/18/24 1126)  SpO2: 98 % (05/18/24 1126) Vital Signs (24h Range):  Temp:  [97.7 °F (36.5 °C)-98.4 °F (36.9 °C)] 97.7 °F (36.5 °C)  Pulse:  [54-74] 57  Resp:  [16-20] 16  SpO2:  [95 %-100 %] 98 %  BP: (127-188)/(58-74) 127/58     Weight: 50.9 kg (112 lb 4.8 oz)  Body mass index is 23.47 kg/m².     Physical Exam  Constitutional:       General: She is not in acute distress.     Appearance: Normal appearance. She is well-developed.   HENT:      Nose: Nose normal.      Comments: NG tube in place     Mouth/Throat:      Mouth: Mucous membranes are dry.   Cardiovascular:      Rate and Rhythm: Normal rate and regular rhythm.      Heart sounds: Normal heart sounds. No murmur heard.  Pulmonary:      Effort: Pulmonary effort is normal.      Breath sounds: Normal breath sounds.   Abdominal:      General: Abdomen is flat.      Palpations: Abdomen is soft.      Tenderness: There is no abdominal tenderness.      Comments: No bowel sounds   Musculoskeletal:      Right lower leg: No edema.      Left lower leg: No edema.   Neurological:      General: No focal deficit present.      Mental Status: She is alert and oriented to person, place, and time. Mental status is at baseline.   Psychiatric:         Mood and Affect: Mood normal.                Significant Labs: All pertinent labs within the past 24 hours have been reviewed.  Blood Culture: No results for input(s): "LABBLOO" in the last 48 hours.  CBC:   Recent Labs   Lab 05/17/24  1117 05/18/24  0422   WBC 9.16 9.54   HGB 15.6 12.8   HCT 45.7 38.8    243     CMP:   Recent Labs   Lab 05/17/24  1117 05/18/24  0422 " "   141   K 3.3* 3.4*   CL 97 104   CO2 24 29   * 151*   BUN 41* 31*   CREATININE 1.3 0.9   CALCIUM 10.1 9.0   PROT 7.5 6.2   ALBUMIN 4.4 3.4*   BILITOT 0.9 0.4   ALKPHOS 66 54*   AST 22 21   ALT 16 10   ANIONGAP 20* 8     Lactic Acid: No results for input(s): "LACTATE" in the last 48 hours.  Lipase:   Recent Labs   Lab 05/17/24  1117   LIPASE 13     Urine Culture: No results for input(s): "LABURIN" in the last 48 hours.  Urine Studies:   Recent Labs   Lab 05/17/24  1106   COLORU Yellow   APPEARANCEUA Clear   PHUR 6.0   SPECGRAV >=1.030*   PROTEINUA 2+*   GLUCUA Negative   KETONESU 1+*   BILIRUBINUA 2+*   OCCULTUA Negative   NITRITE Negative   UROBILINOGEN Negative   LEUKOCYTESUR Negative   RBCUA 1   WBCUA 1   BACTERIA Occasional   HYALINECASTS 1       Significant Imaging: I have reviewed all pertinent imaging results/findings within the past 24 hours.  I have reviewed and interpreted all pertinent imaging results/findings within the past 24 hours.  Abdominal x-ray shows NG tube in place, no change in bowel gas.  She does have some gas in the colon.  "

## 2024-05-18 NOTE — PROGRESS NOTES
[facility] Surg (3rd Fl)  General Surgery  Progress Note    Subjective:     Interval History:    Patient was some lower abdominal pain early this morning.  Patient denies any pain at this time.  No nausea.  Denies fever chills.  No bowel movement or flatus.  Post-Op Info:  * No surgery found *          Medications:  Continuous Infusions:   Amino acid 4.25% - dextrose 5% (CLINIMIX-E) solution with additives (1L provides 42.5 gm AA, 50 gm CHO (170 kcal/L dextrose), Na 35, K 30, Mg 5, Ca 4.5, Acetate 70, Cl 39, Phos 15)   Intravenous Continuous 100 mL/hr at 05/18/24 1037 New Bag at 05/18/24 1037     Scheduled Meds:  PRN Meds:  Current Facility-Administered Medications:     HYDROmorphone, 0.5 mg, Intravenous, Q6H PRN    melatonin, 6 mg, Oral, Nightly PRN    ondansetron, 4 mg, Intravenous, Q6H PRN    sodium chloride 0.9%, 10 mL, Intravenous, PRN     Objective:     Vital Signs (Most Recent):  Temp: 98.3 °F (36.8 °C) (05/18/24 0746)  Pulse: (!) 56 (05/18/24 1001)  Resp: 20 (05/18/24 0810)  BP: (!) 155/68 (05/18/24 0746)  SpO2: 98 % (05/18/24 0746) Vital Signs (24h Range):  Temp:  [97.7 °F (36.5 °C)-98.4 °F (36.9 °C)] 98.3 °F (36.8 °C)  Pulse:  [54-74] 56  Resp:  [16-20] 20  SpO2:  [95 %-100 %] 98 %  BP: (133-188)/(61-74) 155/68       Intake/Output Summary (Last 24 hours) at 5/18/2024 1058  Last data filed at 5/18/2024 1016  Gross per 24 hour   Intake 1286.91 ml   Output 800 ml   Net 486.91 ml       Physical Exam    Patient is sitting up in bed.  NG tube in place.  Bilious aspirate in canister.    Abdomen is fairly soft.  No real distention.  No tenderness or guarding.  No masses or hernias.  Significant Labs:  All pertinent labs from the last 24 hours have been reviewed.    Significant Diagnostics:  I have reviewed all pertinent imaging results/findings within the past 24 hours.    Assessment/Plan:     Active Diagnoses:    Diagnosis Date Noted POA    PRINCIPAL PROBLEM:  Small bowel obstruction [K56.609] 05/17/2024 Yes     Irritable bowel syndrome with diarrhea [K58.0] 12/13/2022 Yes    Stage 3a chronic kidney disease [N18.31] 05/18/2021 Yes    Anxiety [F41.9] 04/05/2019 Yes    Coronary artery disease involving native coronary artery of native heart without angina pectoris [I25.10] 04/05/2019 Yes    HTN (hypertension) [I10] 08/12/2018 Yes    Hyperlipidemia [E78.5] 08/12/2018 Yes      Problems Resolved During this Admission:       No pain at this time.  Possible partial obstruction.  Some air and stool in the colon.  Continue bowel rest NG tube and IV fluids.  Repeat imaging and exam.  Will order Gastrografin study if no improvement.    Louie Betts MD  General Surgery  [place] - Med Surg (3rd Fl)

## 2024-05-18 NOTE — ASSESSMENT & PLAN NOTE
Creatine stable for now. BMP reviewed- noted Estimated Creatinine Clearance: 30.9 mL/min (based on SCr of 0.9 mg/dL). according to latest data. Based on current GFR, CKD stage is stage 3 - GFR 30-59.  Monitor UOP and serial BMP and adjust therapy as needed. Renally dose meds. Avoid nephrotoxic medications and procedures.

## 2024-05-18 NOTE — PLAN OF CARE
Problem: Adult Inpatient Plan of Care  Goal: Plan of Care Review  5/18/2024 0428 by Ronna Mathis RN  Outcome: Progressing  5/18/2024 0428 by Ronna Mathis RN  Flowsheets (Taken 5/18/2024 0428)  Plan of Care Reviewed With: patient  Goal: Patient-Specific Goal (Individualized)  Outcome: Progressing  Goal: Absence of Hospital-Acquired Illness or Injury  Outcome: Progressing  Goal: Optimal Comfort and Wellbeing  Outcome: Progressing  Goal: Readiness for Transition of Care  Outcome: Progressing     Problem: Fall Injury Risk  Goal: Absence of Fall and Fall-Related Injury  Outcome: Progressing     Problem: Pain Acute  Goal: Optimal Pain Control and Function  Outcome: Progressing

## 2024-05-18 NOTE — HOSPITAL COURSE
Patient still is not passing gas or stool.  Abdominal x-ray looks unchanged.  NG tube still working well and decompressing her stomach.  Her pain is well controlled using Dilaudid and Zofran.  Surgery is following along.    5/20: PT HD stable on room air.  Small bowel follow through with continued small bowel obstruction. NG tube in place. General surgery recommending surgery.  Cardiology consulted for clearance.      Patient developing sepsis and will transfer to higher level of care for SBO.

## 2024-05-19 LAB
ALBUMIN SERPL BCP-MCNC: 3.2 G/DL (ref 3.5–5.2)
ALP SERPL-CCNC: 47 U/L (ref 55–135)
ALT SERPL W/O P-5'-P-CCNC: 11 U/L (ref 10–44)
ANION GAP SERPL CALC-SCNC: 10 MMOL/L (ref 8–16)
AST SERPL-CCNC: 15 U/L (ref 10–40)
BASOPHILS # BLD AUTO: 0.05 K/UL (ref 0–0.2)
BASOPHILS NFR BLD: 0.5 % (ref 0–1.9)
BILIRUB SERPL-MCNC: 0.5 MG/DL (ref 0.1–1)
BUN SERPL-MCNC: 32 MG/DL (ref 8–23)
CALCIUM SERPL-MCNC: 9.2 MG/DL (ref 8.7–10.5)
CHLORIDE SERPL-SCNC: 106 MMOL/L (ref 95–110)
CO2 SERPL-SCNC: 23 MMOL/L (ref 23–29)
CREAT SERPL-MCNC: 0.7 MG/DL (ref 0.5–1.4)
DIFFERENTIAL METHOD BLD: ABNORMAL
EOSINOPHIL # BLD AUTO: 0.2 K/UL (ref 0–0.5)
EOSINOPHIL NFR BLD: 1.9 % (ref 0–8)
ERYTHROCYTE [DISTWIDTH] IN BLOOD BY AUTOMATED COUNT: 13.7 % (ref 11.5–14.5)
EST. GFR  (NO RACE VARIABLE): >60 ML/MIN/1.73 M^2
GLUCOSE SERPL-MCNC: 86 MG/DL (ref 70–110)
HCT VFR BLD AUTO: 42 % (ref 37–48.5)
HGB BLD-MCNC: 14 G/DL (ref 12–16)
IMM GRANULOCYTES # BLD AUTO: 0.03 K/UL (ref 0–0.04)
IMM GRANULOCYTES NFR BLD AUTO: 0.3 % (ref 0–0.5)
LYMPHOCYTES # BLD AUTO: 1.9 K/UL (ref 1–4.8)
LYMPHOCYTES NFR BLD: 18.3 % (ref 18–48)
MCH RBC QN AUTO: 29.2 PG (ref 27–31)
MCHC RBC AUTO-ENTMCNC: 33.3 G/DL (ref 32–36)
MCV RBC AUTO: 88 FL (ref 82–98)
MONOCYTES # BLD AUTO: 1.4 K/UL (ref 0.3–1)
MONOCYTES NFR BLD: 13.5 % (ref 4–15)
NEUTROPHILS # BLD AUTO: 6.7 K/UL (ref 1.8–7.7)
NEUTROPHILS NFR BLD: 65.5 % (ref 38–73)
NRBC BLD-RTO: 0 /100 WBC
PLATELET # BLD AUTO: 184 K/UL (ref 150–450)
PMV BLD AUTO: 12.2 FL (ref 9.2–12.9)
POTASSIUM SERPL-SCNC: 3.9 MMOL/L (ref 3.5–5.1)
PROT SERPL-MCNC: 6 G/DL (ref 6–8.4)
RBC # BLD AUTO: 4.79 M/UL (ref 4–5.4)
SODIUM SERPL-SCNC: 139 MMOL/L (ref 136–145)
WBC # BLD AUTO: 10.25 K/UL (ref 3.9–12.7)

## 2024-05-19 PROCEDURE — 96376 TX/PRO/DX INJ SAME DRUG ADON: CPT

## 2024-05-19 PROCEDURE — 36415 COLL VENOUS BLD VENIPUNCTURE: CPT | Mod: HCNC | Performed by: FAMILY MEDICINE

## 2024-05-19 PROCEDURE — 63600175 PHARM REV CODE 636 W HCPCS: Mod: HCNC | Performed by: FAMILY MEDICINE

## 2024-05-19 PROCEDURE — 99233 SBSQ HOSP IP/OBS HIGH 50: CPT | Mod: HCNC,,, | Performed by: FAMILY MEDICINE

## 2024-05-19 PROCEDURE — 25000003 PHARM REV CODE 250: Mod: HCNC | Performed by: FAMILY MEDICINE

## 2024-05-19 PROCEDURE — 85025 COMPLETE CBC W/AUTO DIFF WBC: CPT | Mod: HCNC | Performed by: FAMILY MEDICINE

## 2024-05-19 PROCEDURE — 94761 N-INVAS EAR/PLS OXIMETRY MLT: CPT | Mod: HCNC

## 2024-05-19 PROCEDURE — 11000001 HC ACUTE MED/SURG PRIVATE ROOM: Mod: HCNC

## 2024-05-19 PROCEDURE — 93010 ELECTROCARDIOGRAM REPORT: CPT | Mod: HCNC,76,, | Performed by: INTERNAL MEDICINE

## 2024-05-19 PROCEDURE — 80053 COMPREHEN METABOLIC PANEL: CPT | Mod: HCNC | Performed by: FAMILY MEDICINE

## 2024-05-19 PROCEDURE — 93005 ELECTROCARDIOGRAM TRACING: CPT | Mod: HCNC

## 2024-05-19 PROCEDURE — 99900035 HC TECH TIME PER 15 MIN (STAT): Mod: HCNC

## 2024-05-19 RX ORDER — ONDANSETRON HYDROCHLORIDE 2 MG/ML
4 INJECTION, SOLUTION INTRAVENOUS ONCE
Status: COMPLETED | OUTPATIENT
Start: 2024-05-19 | End: 2024-05-19

## 2024-05-19 RX ORDER — HYDROMORPHONE HYDROCHLORIDE 1 MG/ML
0.5 INJECTION, SOLUTION INTRAMUSCULAR; INTRAVENOUS; SUBCUTANEOUS ONCE
Status: COMPLETED | OUTPATIENT
Start: 2024-05-19 | End: 2024-05-19

## 2024-05-19 RX ADMIN — HYDROMORPHONE HYDROCHLORIDE 0.5 MG: 1 INJECTION, SOLUTION INTRAMUSCULAR; INTRAVENOUS; SUBCUTANEOUS at 06:05

## 2024-05-19 RX ADMIN — ONDANSETRON 4 MG: 2 INJECTION INTRAMUSCULAR; INTRAVENOUS at 05:05

## 2024-05-19 RX ADMIN — HYDROMORPHONE HYDROCHLORIDE 0.5 MG: 1 INJECTION, SOLUTION INTRAMUSCULAR; INTRAVENOUS; SUBCUTANEOUS at 04:05

## 2024-05-19 RX ADMIN — ONDANSETRON 4 MG: 2 INJECTION INTRAMUSCULAR; INTRAVENOUS at 04:05

## 2024-05-19 RX ADMIN — HYDROMORPHONE HYDROCHLORIDE 0.5 MG: 1 INJECTION, SOLUTION INTRAMUSCULAR; INTRAVENOUS; SUBCUTANEOUS at 10:05

## 2024-05-19 RX ADMIN — HYDROMORPHONE HYDROCHLORIDE 0.5 MG: 1 INJECTION, SOLUTION INTRAMUSCULAR; INTRAVENOUS; SUBCUTANEOUS at 11:05

## 2024-05-19 RX ADMIN — LEUCINE, PHENYLALANINE, LYSINE, METHIONINE, ISOLEUCINE, VALINE, HISTIDINE, THREONINE, TRYPTOPHAN, ALANINE, GLYCINE, ARGININE, PROLINE, SERINE, TYROSINE, SODIUM ACETATE, DIBASIC POTASSIUM PHOSPHATE, MAGNESIUM CHLORIDE, SODIUM CHLORIDE, CALCIUM CHLORIDE, DEXTROSE
311; 238; 247; 170; 255; 247; 204; 179; 77; 880; 438; 489; 289; 213; 17; 297; 261; 51; 77; 33; 5 INJECTION INTRAVENOUS at 09:05

## 2024-05-19 RX ADMIN — ONDANSETRON 4 MG: 2 INJECTION INTRAMUSCULAR; INTRAVENOUS at 11:05

## 2024-05-19 NOTE — PLAN OF CARE
[facility] - Med Surg (3rd Fl)  Initial Discharge Assessment       Primary Care Provider: Carla Butler MD    Admission Diagnosis: Small bowel obstruction [K56.609]  Encounter for imaging study to confirm nasogastric (NG) tube placement [Z01.89]    Admission Date: 5/17/2024  Expected Discharge Date:     Transition of Care Barriers: None    Payor: HU[employer] MANAGED MEDICARE / Plan: HU[employer]NA MEDICARE HMO / Product Type: Capitation /     Extended Emergency Contact Information  Primary Emergency Contact: Rody Callejas  Address: [address] [place] [address] [id number] [place] [address]  Mobile Phone: [phone]  Relation: Daughter  Secondary Emergency Contact: BrittaAna  Mobile Phone: [phone]  Relation: Grandchild    Discharge Plan A: Home  Discharge Plan B: Home with family      [facility] - [facility] [address] [facility]  [address] [facility] [address]  Phone: [phone] Fax: [phone]      Initial Assessment (most recent)       Adult Discharge Assessment - 05/19/24 0813          Discharge Assessment    Assessment Type Discharge Planning Assessment     Confirmed/corrected address, phone number and insurance Yes     Confirmed Demographics Correct on Facesheet     Source of Information family     If unable to respond/provide information was family/caregiver contacted? Yes     Contact Name/Number Ana Callejas (Grand Daughter) [phone]     Reason For Admission Small bowel obstruction     People in Home child(brandt), adult     Do you expect to return to your current living situation? Yes     Do you have help at home or someone to help you manage your care at home? Yes     Who are your caregiver(s) and their phone number(s)? Rody Callejas (Daughter) [phone] & Silvana Callejas (Grand Daughter) [phone]     Prior to hospitilization cognitive status: Unable to Assess     Current cognitive status: Unable to Assess     Walking or Climbing Stairs Difficulty no      Dressing/Bathing Difficulty no     Do you have any problems with: Errands/Grocery     Home Accessibility stairs to enter home;other (see comments)   Elevator    Number of Stairs, Main Entrance other (see comments)   20 steps    Surface of Stairs, Main Entrance hardwood     Stair Railings, Main Entrance railings safe and in good condition     Home Layout Able to live on 1st floor     Equipment Currently Used at Home none     Readmission within 30 days? No     Patient currently being followed by outpatient case management? No     Do you currently have service(s) that help you manage your care at home? No     Do you take prescription medications? Yes     Do you have prescription coverage? Yes     Coverage HU[employer]NA MANAGED MEDICARE - HUMANA MEDICARE HMO - CAPITATED     Do you have any problems affording any of your prescribed medications? TBD     Is the patient taking medications as prescribed? yes     Who is going to help you get home at discharge? Silvana Callejas (Grand Daughter) [phone]     How do you get to doctors appointments? family or friend will provide     Are you on dialysis? No     Do you take coumadin? No     Discharge Plan A Home     Discharge Plan B Home with family     DME Needed Upon Discharge  none     Discharge Plan discussed with: --   Silvana Callejas (Grand Daughter) [phone]    Transition of Care Barriers None                      Discharge Planning Assessment completed via phone with patient's grand daughter, Ana.  No social service needs were identified at this time.  Ana encouraged to contact Case Management should any needs arise.     to remain available.    Gail Ruiz, MSW, LCSW

## 2024-05-19 NOTE — NURSING
Pt complained of 10/10 chest pain. Pt described it as a lot of pressure. Dr. Diana notified. EKG ordered per MD.

## 2024-05-19 NOTE — NURSING
Pt complains of vomiting intermittently. Dr. Diana notified. Pt off of suction until series of x-rays completed and zofran given per MD order.

## 2024-05-19 NOTE — ASSESSMENT & PLAN NOTE
Chronic, controlled. Latest blood pressure and vitals reviewed-     Temp:  [97.5 °F (36.4 °C)-98.4 °F (36.9 °C)]   Pulse:  [49-67]   Resp:  [14-20]   BP: (125-143)/(60-68)   SpO2:  [95 %-100 %] .   Home meds for hypertension were reviewed and noted below.   Hypertension Medications               lisinopriL 10 MG tablet lisinopriL 10 mg tablet, [RxNorm: 341199]            While in the hospital, will manage blood pressure as follows; Adjust home antihypertensive regimen as follows- hold lisinopril for now    Will utilize p.r.n. blood pressure medication only if patient's blood pressure greater than 180/110 and she develops symptoms such as worsening chest pain or shortness of breath.

## 2024-05-19 NOTE — PROGRESS NOTES
[facility] Surg ([address] [place] [address] Medicine  Progress Note    Patient Name: Alexandra Gleason  MRN: [id number]  Patient Class: IP- Inpatient   Admission Date: 5/17/2024  Length of Stay: 2 days  Attending Physician: Lebron Olivares MD  Primary Care Provider: Carla Butler MD        Subjective:     Principal Problem:Small bowel obstruction        HPI:  89 yo WF admitted for SBO.  She has a 3 day history of lower abd pain, bloating, diarrhea, nausea and vomiting.  Workup revealed SBO with transition zone on CT of Abd.  Being admitted for surgical consultation, NGT suction, IVF's, NPO.    Overview/Hospital Course:  Patient still is not passing gas or stool.  Abdominal x-ray looks unchanged.  NG tube still working well and decompressing her stomach.  Her pain is well controlled using Dilaudid and Zofran.  Surgery is following along.      Interval History: still with abd pain.  She was not passing any stool or gas.  She had a bout of chest pain after drinking the Gastrografin.  EKG shows normal sinus rhythm, no changes.    Review of Systems   Constitutional:  Negative for appetite change and fever.   Respiratory:  Negative for cough.    Cardiovascular:  Negative for chest pain and leg swelling.   Gastrointestinal:  Positive for abdominal pain, nausea and vomiting. Negative for constipation and diarrhea.   Genitourinary:  Negative for dysuria and urgency.   Musculoskeletal:  Negative for gait problem.   Psychiatric/Behavioral:  The patient is not nervous/anxious.      Objective:     Vital Signs (Most Recent):  Temp: 98.4 °F (36.9 °C) (05/19/24 1159)  Pulse: 67 (05/19/24 1401)  Resp: 14 (05/19/24 1159)  BP: (!) 143/68 (05/19/24 1159)  SpO2: 95 % (05/19/24 1159) Vital Signs (24h Range):  Temp:  [97.5 °F (36.4 °C)-98.4 °F (36.9 °C)] 98.4 °F (36.9 °C)  Pulse:  [49-67] 67  Resp:  [14-20] 14  SpO2:  [95 %-100 %] 95 %  BP: (125-143)/(60-68) 143/68     Weight: 50.9 kg (112 lb 4.8 oz)  Body mass index is 23.47  "kg/m².    Intake/Output Summary (Last 24 hours) at 5/19/2024 1439  Last data filed at 5/19/2024 1028  Gross per 24 hour   Intake --   Output 1250 ml   Net -1250 ml         Physical Exam  Constitutional:       Appearance: Normal appearance.   HENT:      Right Ear: Tympanic membrane normal.      Left Ear: Tympanic membrane normal.   Cardiovascular:      Rate and Rhythm: Normal rate and regular rhythm.      Heart sounds: No murmur heard.     No friction rub. No gallop.   Pulmonary:      Effort: Pulmonary effort is normal.      Breath sounds: Normal breath sounds.   Musculoskeletal:      Right lower leg: No edema.      Left lower leg: No edema.   Neurological:      Mental Status: She is alert.             Significant Labs: All pertinent labs within the past 24 hours have been reviewed.  CBC:   Recent Labs   Lab 05/18/24  0422 05/19/24  0750   WBC 9.54 10.25   HGB 12.8 14.0   HCT 38.8 42.0    184     CMP:   Recent Labs   Lab 05/18/24  0422 05/19/24  0751    139   K 3.4* 3.9    106   CO2 29 23   * 86   BUN 31* 32*   CREATININE 0.9 0.7   CALCIUM 9.0 9.2   PROT 6.2 6.0   ALBUMIN 3.4* 3.2*   BILITOT 0.4 0.5   ALKPHOS 54* 47*   AST 21 15   ALT 10 11   ANIONGAP 8 10     Urine Studies: No results for input(s): "COLORU", "APPEARANCEUA", "PHUR", "SPECGRAV", "PROTEINUA", "GLUCUA", "KETONESU", "BILIRUBINUA", "OCCULTUA", "NITRITE", "UROBILINOGEN", "LEUKOCYTESUR", "RBCUA", "WBCUA", "BACTERIA", "SQUAMEPITHEL", "HYALINECASTS" in the last 48 hours.    Invalid input(s): "WRIGHTSUR"    Significant Imaging: I have reviewed all pertinent imaging results/findings within the past 24 hours.  I have reviewed and interpreted all pertinent imaging results/findings within the past 24 hours.    Assessment/Plan:      * Small bowel obstruction  Consult general surgery  NGT suction overnight  IVF's  Check labs and abd xray in the morning  Start Clinimix at 100 cc/hour  Check small bowel series      Irritable bowel syndrome " with diarrhea  Hold cholestyramine for now.          Stage 3a chronic kidney disease  Creatine stable for now. BMP reviewed- noted Estimated Creatinine Clearance: 39.7 mL/min (based on SCr of 0.7 mg/dL). according to latest data. Based on current GFR, CKD stage is stage 3 - GFR 30-59.  Monitor UOP and serial BMP and adjust therapy as needed. Renally dose meds. Avoid nephrotoxic medications and procedures.    Coronary artery disease involving native coronary artery of native heart without angina pectoris  Patient with known CAD, which is controlled Will continue ASA and monitor for S/Sx of angina/ACS. Continue to monitor on telemetry.   Monitor for any chest pain  Hold ASA for now    Anxiety  She takes Zoloft 25 mg daily.  Will hold for now.      Hyperlipidemia  Hold Statin for now      HTN (hypertension)  Chronic, controlled. Latest blood pressure and vitals reviewed-     Temp:  [97.5 °F (36.4 °C)-98.4 °F (36.9 °C)]   Pulse:  [49-67]   Resp:  [14-20]   BP: (125-143)/(60-68)   SpO2:  [95 %-100 %] .   Home meds for hypertension were reviewed and noted below.   Hypertension Medications               lisinopriL 10 MG tablet lisinopriL 10 mg tablet, [RxNorm: 798058]            While in the hospital, will manage blood pressure as follows; Adjust home antihypertensive regimen as follows- hold lisinopril for now    Will utilize p.r.n. blood pressure medication only if patient's blood pressure greater than 180/110 and she develops symptoms such as worsening chest pain or shortness of breath.      VTE Risk Mitigation (From admission, onward)           Ordered     IP VTE HIGH RISK PATIENT  Once         05/17/24 1258     Place sequential compression device  Until discontinued         05/17/24 1258                    Discharge Planning   AMADO:      Code Status: Full Code   Is the patient medically ready for discharge?:     Reason for patient still in hospital (select all that apply): Treatment and Imaging  Discharge Plan A: Home                   Lebron Olivares MD  Department of Hospital Medicine   [facility] Surg (3rd Fl)

## 2024-05-19 NOTE — PLAN OF CARE
Problem: Adult Inpatient Plan of Care  Goal: Plan of Care Review  Outcome: Progressing  Flowsheets (Taken 5/19/2024 8384)  Plan of Care Reviewed With: patient  Goal: Patient-Specific Goal (Individualized)  Outcome: Progressing  Goal: Absence of Hospital-Acquired Illness or Injury  Outcome: Progressing  Goal: Optimal Comfort and Wellbeing  Outcome: Progressing  Goal: Readiness for Transition of Care  Outcome: Progressing     Problem: Fall Injury Risk  Goal: Absence of Fall and Fall-Related Injury  Outcome: Progressing     Problem: Pain Acute  Goal: Optimal Pain Control and Function  Outcome: Progressing     Problem: Skin Injury Risk Increased  Goal: Skin Health and Integrity  Outcome: Progressing

## 2024-05-19 NOTE — ASSESSMENT & PLAN NOTE
Consult general surgery  NGT suction overnight  IVF's  Check labs and abd xray in the morning  Start Clinimix at 100 cc/hour  Check small bowel series

## 2024-05-19 NOTE — PLAN OF CARE
Problem: Adult Inpatient Plan of Care  Goal: Plan of Care Review  Outcome: Progressing     Problem: Fall Injury Risk  Goal: Absence of Fall and Fall-Related Injury  Outcome: Progressing     Problem: Pain Acute  Goal: Optimal Pain Control and Function  Outcome: Progressing     Problem: Skin Injury Risk Increased  Goal: Skin Health and Integrity  Outcome: Progressing

## 2024-05-19 NOTE — SUBJECTIVE & OBJECTIVE
Interval History: still with abd pain.  She was not passing any stool or gas.  She had a bout of chest pain after drinking the Gastrografin.  EKG shows normal sinus rhythm, no changes.    Review of Systems   Constitutional:  Negative for appetite change and fever.   Respiratory:  Negative for cough.    Cardiovascular:  Negative for chest pain and leg swelling.   Gastrointestinal:  Positive for abdominal pain, nausea and vomiting. Negative for constipation and diarrhea.   Genitourinary:  Negative for dysuria and urgency.   Musculoskeletal:  Negative for gait problem.   Psychiatric/Behavioral:  The patient is not nervous/anxious.      Objective:     Vital Signs (Most Recent):  Temp: 98.4 °F (36.9 °C) (05/19/24 1159)  Pulse: 67 (05/19/24 1401)  Resp: 14 (05/19/24 1159)  BP: (!) 143/68 (05/19/24 1159)  SpO2: 95 % (05/19/24 1159) Vital Signs (24h Range):  Temp:  [97.5 °F (36.4 °C)-98.4 °F (36.9 °C)] 98.4 °F (36.9 °C)  Pulse:  [49-67] 67  Resp:  [14-20] 14  SpO2:  [95 %-100 %] 95 %  BP: (125-143)/(60-68) 143/68     Weight: 50.9 kg (112 lb 4.8 oz)  Body mass index is 23.47 kg/m².    Intake/Output Summary (Last 24 hours) at 5/19/2024 1439  Last data filed at 5/19/2024 1028  Gross per 24 hour   Intake --   Output 1250 ml   Net -1250 ml         Physical Exam  Constitutional:       Appearance: Normal appearance.   HENT:      Right Ear: Tympanic membrane normal.      Left Ear: Tympanic membrane normal.   Cardiovascular:      Rate and Rhythm: Normal rate and regular rhythm.      Heart sounds: No murmur heard.     No friction rub. No gallop.   Pulmonary:      Effort: Pulmonary effort is normal.      Breath sounds: Normal breath sounds.   Musculoskeletal:      Right lower leg: No edema.      Left lower leg: No edema.   Neurological:      Mental Status: She is alert.             Significant Labs: All pertinent labs within the past 24 hours have been reviewed.  CBC:   Recent Labs   Lab 05/18/24  0422 05/19/24  0750   WBC 9.54 10.25  "  HGB 12.8 14.0   HCT 38.8 42.0    184     CMP:   Recent Labs   Lab 05/18/24  0422 05/19/24  0751    139   K 3.4* 3.9    106   CO2 29 23   * 86   BUN 31* 32*   CREATININE 0.9 0.7   CALCIUM 9.0 9.2   PROT 6.2 6.0   ALBUMIN 3.4* 3.2*   BILITOT 0.4 0.5   ALKPHOS 54* 47*   AST 21 15   ALT 10 11   ANIONGAP 8 10     Urine Studies: No results for input(s): "COLORU", "APPEARANCEUA", "PHUR", "SPECGRAV", "PROTEINUA", "GLUCUA", "KETONESU", "BILIRUBINUA", "OCCULTUA", "NITRITE", "UROBILINOGEN", "LEUKOCYTESUR", "RBCUA", "WBCUA", "BACTERIA", "SQUAMEPITHEL", "HYALINECASTS" in the last 48 hours.    Invalid input(s): "WRIGHTSUR"    Significant Imaging: I have reviewed all pertinent imaging results/findings within the past 24 hours.  I have reviewed and interpreted all pertinent imaging results/findings within the past 24 hours.  "

## 2024-05-19 NOTE — ASSESSMENT & PLAN NOTE
Creatine stable for now. BMP reviewed- noted Estimated Creatinine Clearance: 39.7 mL/min (based on SCr of 0.7 mg/dL). according to latest data. Based on current GFR, CKD stage is stage 3 - GFR 30-59.  Monitor UOP and serial BMP and adjust therapy as needed. Renally dose meds. Avoid nephrotoxic medications and procedures.

## 2024-05-20 ENCOUNTER — HOSPITAL ENCOUNTER (INPATIENT)
Facility: HOSPITAL | Age: 89
LOS: 5 days | Discharge: HOME HEALTH | DRG: 336 | End: 2024-05-25
Attending: FAMILY MEDICINE | Admitting: FAMILY MEDICINE
Payer: MEDICARE

## 2024-05-20 ENCOUNTER — ANESTHESIA (OUTPATIENT)
Dept: SURGERY | Facility: HOSPITAL | Age: 89
DRG: 336 | End: 2024-05-20
Payer: MEDICARE

## 2024-05-20 ENCOUNTER — ANESTHESIA EVENT (OUTPATIENT)
Dept: SURGERY | Facility: HOSPITAL | Age: 89
DRG: 336 | End: 2024-05-20
Payer: MEDICARE

## 2024-05-20 VITALS
HEIGHT: 58 IN | BODY MASS INDEX: 23.57 KG/M2 | HEART RATE: 81 BPM | DIASTOLIC BLOOD PRESSURE: 57 MMHG | TEMPERATURE: 98 F | SYSTOLIC BLOOD PRESSURE: 108 MMHG | WEIGHT: 112.31 LBS | RESPIRATION RATE: 24 BRPM | OXYGEN SATURATION: 97 %

## 2024-05-20 DIAGNOSIS — R07.9 CHEST PAIN: ICD-10-CM

## 2024-05-20 DIAGNOSIS — R00.0 TACHYCARDIA: ICD-10-CM

## 2024-05-20 DIAGNOSIS — R53.1 WEAKNESS: Primary | ICD-10-CM

## 2024-05-20 DIAGNOSIS — K56.609 SBO (SMALL BOWEL OBSTRUCTION): ICD-10-CM

## 2024-05-20 PROBLEM — N17.9 AKI (ACUTE KIDNEY INJURY): Status: ACTIVE | Noted: 2024-05-20

## 2024-05-20 PROBLEM — A41.9 SEPSIS: Status: ACTIVE | Noted: 2024-05-20

## 2024-05-20 LAB
ALBUMIN SERPL BCP-MCNC: 3.2 G/DL (ref 3.5–5.2)
ALP SERPL-CCNC: 58 U/L (ref 55–135)
ALT SERPL W/O P-5'-P-CCNC: 22 U/L (ref 10–44)
ANION GAP SERPL CALC-SCNC: 15 MMOL/L (ref 8–16)
ANION GAP SERPL CALC-SCNC: 17 MMOL/L (ref 8–16)
ANISOCYTOSIS BLD QL SMEAR: SLIGHT
AST SERPL-CCNC: 24 U/L (ref 10–40)
AV INDEX (PROSTH): 1.01
AV MEAN GRADIENT: 2 MMHG
AV PEAK GRADIENT: 5 MMHG
AV VALVE AREA BY VELOCITY RATIO: 3.21 CM²
AV VALVE AREA: 3.31 CM²
AV VELOCITY RATIO: 0.98
BACTERIA #/AREA URNS HPF: NORMAL /HPF
BASOPHILS # BLD AUTO: ABNORMAL K/UL (ref 0–0.2)
BASOPHILS NFR BLD: 0 % (ref 0–1.9)
BILIRUB SERPL-MCNC: 0.5 MG/DL (ref 0.1–1)
BILIRUB UR QL STRIP: ABNORMAL
BSA FOR ECHO PROCEDURE: 1.44 M2
BUN SERPL-MCNC: 74 MG/DL (ref 8–23)
BUN SERPL-MCNC: 77 MG/DL (ref 8–23)
CALCIUM SERPL-MCNC: 10.3 MG/DL (ref 8.7–10.5)
CALCIUM SERPL-MCNC: 9.5 MG/DL (ref 8.7–10.5)
CHLORIDE SERPL-SCNC: 96 MMOL/L (ref 95–110)
CHLORIDE SERPL-SCNC: 97 MMOL/L (ref 95–110)
CLARITY UR: CLEAR
CO2 SERPL-SCNC: 24 MMOL/L (ref 23–29)
CO2 SERPL-SCNC: 25 MMOL/L (ref 23–29)
COLOR UR: YELLOW
CREAT SERPL-MCNC: 1.5 MG/DL (ref 0.5–1.4)
CREAT SERPL-MCNC: 1.7 MG/DL (ref 0.5–1.4)
CV ECHO LV RWT: 0.44 CM
DACRYOCYTES BLD QL SMEAR: ABNORMAL
DIFFERENTIAL METHOD BLD: ABNORMAL
DOHLE BOD BLD QL SMEAR: PRESENT
DOP CALC AO PEAK VEL: 1.13 M/S
DOP CALC AO VTI: 14.8 CM
DOP CALC LVOT AREA: 3.3 CM2
DOP CALC LVOT DIAMETER: 2.04 CM
DOP CALC LVOT PEAK VEL: 1.11 M/S
DOP CALC LVOT STROKE VOLUME: 49 CM3
DOP CALC MV VTI: 20.4 CM
DOP CALCLVOT PEAK VEL VTI: 15 CM
E WAVE DECELERATION TIME: 277.84 MSEC
E/A RATIO: 0.7
E/E' RATIO: 11.6 M/S
ECHO LV POSTERIOR WALL: 0.65 CM (ref 0.6–1.1)
EOSINOPHIL # BLD AUTO: ABNORMAL K/UL (ref 0–0.5)
EOSINOPHIL NFR BLD: 0 % (ref 0–8)
ERYTHROCYTE [DISTWIDTH] IN BLOOD BY AUTOMATED COUNT: 13.6 % (ref 11.5–14.5)
EST. GFR  (NO RACE VARIABLE): 28 ML/MIN/1.73 M^2
EST. GFR  (NO RACE VARIABLE): 33 ML/MIN/1.73 M^2
FRACTIONAL SHORTENING: 56 % (ref 28–44)
GIANT PLATELETS BLD QL SMEAR: PRESENT
GLUCOSE SERPL-MCNC: 107 MG/DL (ref 70–110)
GLUCOSE SERPL-MCNC: 132 MG/DL (ref 70–110)
GLUCOSE UR QL STRIP: NEGATIVE
HCT VFR BLD AUTO: 43.6 % (ref 37–48.5)
HGB BLD-MCNC: 14.8 G/DL (ref 12–16)
HGB UR QL STRIP: ABNORMAL
HOWELL-JOLLY BOD BLD QL SMEAR: ABNORMAL
HYALINE CASTS #/AREA URNS LPF: 0 /LPF
IMM GRANULOCYTES # BLD AUTO: ABNORMAL K/UL (ref 0–0.04)
IMM GRANULOCYTES NFR BLD AUTO: ABNORMAL % (ref 0–0.5)
INTERVENTRICULAR SEPTUM: 0.6 CM (ref 0.6–1.1)
IVC DIAMETER: 1.57 CM
KETONES UR QL STRIP: NEGATIVE
LA MAJOR: 2.1 CM
LACTATE SERPL-SCNC: 2 MMOL/L (ref 0.5–2.2)
LACTATE SERPL-SCNC: 2.1 MMOL/L (ref 0.5–2.2)
LEFT ATRIUM SIZE: 2.12 CM
LEFT ATRIUM VOLUME INDEX MOD: 10.5 ML/M2
LEFT ATRIUM VOLUME MOD: 14.96 CM3
LEFT INTERNAL DIMENSION IN SYSTOLE: 1.32 CM (ref 2.1–4)
LEFT VENTRICLE DIASTOLIC VOLUME INDEX: 23.8 ML/M2
LEFT VENTRICLE DIASTOLIC VOLUME: 34.04 ML
LEFT VENTRICLE MASS INDEX: 29 G/M2
LEFT VENTRICLE SYSTOLIC VOLUME INDEX: 3 ML/M2
LEFT VENTRICLE SYSTOLIC VOLUME: 4.29 ML
LEFT VENTRICULAR INTERNAL DIMENSION IN DIASTOLE: 2.97 CM (ref 3.5–6)
LEFT VENTRICULAR MASS: 41.33 G
LEUKOCYTE ESTERASE UR QL STRIP: NEGATIVE
LV LATERAL E/E' RATIO: 11.6 M/S
LV SEPTAL E/E' RATIO: 11.6 M/S
LVOT MG: 1.98 MMHG
LVOT MV: 0.65 CM/S
LYMPHOCYTES # BLD AUTO: ABNORMAL K/UL (ref 1–4.8)
LYMPHOCYTES NFR BLD: 6 % (ref 18–48)
MAGNESIUM SERPL-MCNC: 2.4 MG/DL (ref 1.6–2.6)
MAGNESIUM SERPL-MCNC: 2.5 MG/DL (ref 1.6–2.6)
MCH RBC QN AUTO: 28.8 PG (ref 27–31)
MCHC RBC AUTO-ENTMCNC: 33.9 G/DL (ref 32–36)
MCV RBC AUTO: 85 FL (ref 82–98)
MICROSCOPIC COMMENT: NORMAL
MONOCYTES # BLD AUTO: ABNORMAL K/UL (ref 0.3–1)
MONOCYTES NFR BLD: 5 % (ref 4–15)
MV MEAN GRADIENT: 1 MMHG
MV PEAK A VEL: 0.83 M/S
MV PEAK E VEL: 0.58 M/S
MV PEAK GRADIENT: 3 MMHG
MV STENOSIS PRESSURE HALF TIME: 71.1 MS
MV VALVE AREA BY CONTINUITY EQUATION: 2.4 CM2
MV VALVE AREA P 1/2 METHOD: 3.09 CM2
NEUTROPHILS NFR BLD: 50 % (ref 38–73)
NEUTS BAND NFR BLD MANUAL: 39 %
NITRITE UR QL STRIP: NEGATIVE
NRBC BLD-RTO: 0 /100 WBC
OHS CV RV/LV RATIO: 0.41 CM
OHS QRS DURATION: 74 MS
OHS QRS DURATION: 76 MS
OHS QTC CALCULATION: 411 MS
OHS QTC CALCULATION: 416 MS
OVALOCYTES BLD QL SMEAR: ABNORMAL
PH UR STRIP: 6 [PH] (ref 5–8)
PHOSPHATE SERPL-MCNC: 4.8 MG/DL (ref 2.7–4.5)
PISA TR MAX VEL: 1.88 M/S
PLATELET # BLD AUTO: 219 K/UL (ref 150–450)
PLATELET BLD QL SMEAR: ABNORMAL
PMV BLD AUTO: 11.6 FL (ref 9.2–12.9)
POCT GLUCOSE: 107 MG/DL (ref 70–110)
POIKILOCYTOSIS BLD QL SMEAR: SLIGHT
POTASSIUM SERPL-SCNC: 3.3 MMOL/L (ref 3.5–5.1)
POTASSIUM SERPL-SCNC: 4.4 MMOL/L (ref 3.5–5.1)
PROCALCITONIN SERPL IA-MCNC: 158.87 NG/ML
PROT SERPL-MCNC: 6.7 G/DL (ref 6–8.4)
PROT UR QL STRIP: ABNORMAL
PULM VEIN S/D RATIO: 1.1
PV MV: 0.17 M/S
PV PEAK D VEL: 0.31 M/S
PV PEAK GRADIENT: 0 MMHG
PV PEAK S VEL: 0.34 M/S
PV PEAK VELOCITY: 0.2 M/S
RA MAJOR: 3.03 CM
RBC # BLD AUTO: 5.14 M/UL (ref 4–5.4)
RBC #/AREA URNS HPF: 2 /HPF (ref 0–4)
RIGHT VENTRICULAR END-DIASTOLIC DIMENSION: 1.21 CM
RV TISSUE DOPPLER FREE WALL SYSTOLIC VELOCITY 1 (APICAL 4 CHAMBER VIEW): 18.01 CM/S
SODIUM SERPL-SCNC: 136 MMOL/L (ref 136–145)
SODIUM SERPL-SCNC: 138 MMOL/L (ref 136–145)
SP GR UR STRIP: 1.01 (ref 1–1.03)
SPHEROCYTES BLD QL SMEAR: ABNORMAL
TDI LATERAL: 0.05 M/S
TDI SEPTAL: 0.05 M/S
TDI: 0.05 M/S
TOXIC GRANULES BLD QL SMEAR: PRESENT
TR MAX PG: 14 MMHG
TRICUSPID ANNULAR PLANE SYSTOLIC EXCURSION: 1.61 CM
URN SPEC COLLECT METH UR: ABNORMAL
UROBILINOGEN UR STRIP-ACNC: NEGATIVE EU/DL
WBC # BLD AUTO: 30.77 K/UL (ref 3.9–12.7)
WBC #/AREA URNS HPF: 1 /HPF (ref 0–5)
WBC TOXIC VACUOLES BLD QL SMEAR: PRESENT
Z-SCORE OF LEFT VENTRICULAR DIMENSION IN END DIASTOLE: -3.86
Z-SCORE OF LEFT VENTRICULAR DIMENSION IN END SYSTOLE: -5.69

## 2024-05-20 PROCEDURE — 63600175 PHARM REV CODE 636 W HCPCS: Mod: HCNC | Performed by: STUDENT IN AN ORGANIZED HEALTH CARE EDUCATION/TRAINING PROGRAM

## 2024-05-20 PROCEDURE — 25000003 PHARM REV CODE 250: Mod: HCNC | Performed by: FAMILY MEDICINE

## 2024-05-20 PROCEDURE — 44005 FREEING OF BOWEL ADHESION: CPT | Mod: HCNC,,, | Performed by: STUDENT IN AN ORGANIZED HEALTH CARE EDUCATION/TRAINING PROGRAM

## 2024-05-20 PROCEDURE — 36415 COLL VENOUS BLD VENIPUNCTURE: CPT | Mod: HCNC,XB | Performed by: STUDENT IN AN ORGANIZED HEALTH CARE EDUCATION/TRAINING PROGRAM

## 2024-05-20 PROCEDURE — 0DN80ZZ RELEASE SMALL INTESTINE, OPEN APPROACH: ICD-10-PCS | Performed by: STUDENT IN AN ORGANIZED HEALTH CARE EDUCATION/TRAINING PROGRAM

## 2024-05-20 PROCEDURE — 36000706: Mod: HCNC | Performed by: STUDENT IN AN ORGANIZED HEALTH CARE EDUCATION/TRAINING PROGRAM

## 2024-05-20 PROCEDURE — 36000707: Mod: HCNC | Performed by: STUDENT IN AN ORGANIZED HEALTH CARE EDUCATION/TRAINING PROGRAM

## 2024-05-20 PROCEDURE — 81000 URINALYSIS NONAUTO W/SCOPE: CPT | Mod: HCNC | Performed by: PHYSICIAN ASSISTANT

## 2024-05-20 PROCEDURE — 99222 1ST HOSP IP/OBS MODERATE 55: CPT | Mod: HCNC,57,, | Performed by: STUDENT IN AN ORGANIZED HEALTH CARE EDUCATION/TRAINING PROGRAM

## 2024-05-20 PROCEDURE — 83605 ASSAY OF LACTIC ACID: CPT | Mod: HCNC | Performed by: PHYSICIAN ASSISTANT

## 2024-05-20 PROCEDURE — 84145 PROCALCITONIN (PCT): CPT | Mod: HCNC | Performed by: PHYSICIAN ASSISTANT

## 2024-05-20 PROCEDURE — 96365 THER/PROPH/DIAG IV INF INIT: CPT

## 2024-05-20 PROCEDURE — 99238 HOSP IP/OBS DSCHRG MGMT 30/<: CPT | Mod: HCNC,,, | Performed by: PHYSICIAN ASSISTANT

## 2024-05-20 PROCEDURE — 20000000 HC ICU ROOM: Mod: HCNC

## 2024-05-20 PROCEDURE — 84100 ASSAY OF PHOSPHORUS: CPT | Mod: HCNC | Performed by: STUDENT IN AN ORGANIZED HEALTH CARE EDUCATION/TRAINING PROGRAM

## 2024-05-20 PROCEDURE — 25000003 PHARM REV CODE 250: Mod: HCNC | Performed by: NURSE ANESTHETIST, CERTIFIED REGISTERED

## 2024-05-20 PROCEDURE — 36415 COLL VENOUS BLD VENIPUNCTURE: CPT | Mod: HCNC,XB | Performed by: PHYSICIAN ASSISTANT

## 2024-05-20 PROCEDURE — 80048 BASIC METABOLIC PNL TOTAL CA: CPT | Mod: HCNC,XB | Performed by: STUDENT IN AN ORGANIZED HEALTH CARE EDUCATION/TRAINING PROGRAM

## 2024-05-20 PROCEDURE — 99232 SBSQ HOSP IP/OBS MODERATE 35: CPT | Mod: HCNC,,, | Performed by: SURGERY

## 2024-05-20 PROCEDURE — 99900035 HC TECH TIME PER 15 MIN (STAT): Mod: HCNC

## 2024-05-20 PROCEDURE — 83735 ASSAY OF MAGNESIUM: CPT | Mod: HCNC | Performed by: PHYSICIAN ASSISTANT

## 2024-05-20 PROCEDURE — D9220A PRA ANESTHESIA: Mod: CRNA,,, | Performed by: NURSE ANESTHETIST, CERTIFIED REGISTERED

## 2024-05-20 PROCEDURE — 96376 TX/PRO/DX INJ SAME DRUG ADON: CPT

## 2024-05-20 PROCEDURE — 37000009 HC ANESTHESIA EA ADD 15 MINS: Mod: HCNC | Performed by: STUDENT IN AN ORGANIZED HEALTH CARE EDUCATION/TRAINING PROGRAM

## 2024-05-20 PROCEDURE — 63600175 PHARM REV CODE 636 W HCPCS: Mod: HCNC | Performed by: PHYSICIAN ASSISTANT

## 2024-05-20 PROCEDURE — 87040 BLOOD CULTURE FOR BACTERIA: CPT | Mod: HCNC | Performed by: PHYSICIAN ASSISTANT

## 2024-05-20 PROCEDURE — D9220A PRA ANESTHESIA: Mod: ANES,,, | Performed by: ANESTHESIOLOGY

## 2024-05-20 PROCEDURE — 25000003 PHARM REV CODE 250: Mod: HCNC | Performed by: STUDENT IN AN ORGANIZED HEALTH CARE EDUCATION/TRAINING PROGRAM

## 2024-05-20 PROCEDURE — 25000003 PHARM REV CODE 250: Mod: HCNC | Performed by: PHYSICIAN ASSISTANT

## 2024-05-20 PROCEDURE — 63600175 PHARM REV CODE 636 W HCPCS: Mod: HCNC | Performed by: FAMILY MEDICINE

## 2024-05-20 PROCEDURE — 96366 THER/PROPH/DIAG IV INF ADDON: CPT

## 2024-05-20 PROCEDURE — 37000008 HC ANESTHESIA 1ST 15 MINUTES: Mod: HCNC | Performed by: STUDENT IN AN ORGANIZED HEALTH CARE EDUCATION/TRAINING PROGRAM

## 2024-05-20 PROCEDURE — 96361 HYDRATE IV INFUSION ADD-ON: CPT

## 2024-05-20 PROCEDURE — 85007 BL SMEAR W/DIFF WBC COUNT: CPT | Mod: HCNC | Performed by: PHYSICIAN ASSISTANT

## 2024-05-20 PROCEDURE — 80053 COMPREHEN METABOLIC PANEL: CPT | Mod: HCNC | Performed by: PHYSICIAN ASSISTANT

## 2024-05-20 PROCEDURE — 94761 N-INVAS EAR/PLS OXIMETRY MLT: CPT | Mod: HCNC

## 2024-05-20 PROCEDURE — 1111F DSCHRG MED/CURRENT MED MERGE: CPT | Mod: HCNC,CPTII,, | Performed by: PHYSICIAN ASSISTANT

## 2024-05-20 PROCEDURE — 83605 ASSAY OF LACTIC ACID: CPT | Mod: 91,HCNC | Performed by: STUDENT IN AN ORGANIZED HEALTH CARE EDUCATION/TRAINING PROGRAM

## 2024-05-20 PROCEDURE — 85027 COMPLETE CBC AUTOMATED: CPT | Mod: HCNC | Performed by: PHYSICIAN ASSISTANT

## 2024-05-20 PROCEDURE — 63600175 PHARM REV CODE 636 W HCPCS: Mod: HCNC | Performed by: NURSE ANESTHETIST, CERTIFIED REGISTERED

## 2024-05-20 PROCEDURE — 83735 ASSAY OF MAGNESIUM: CPT | Mod: 91,HCNC | Performed by: STUDENT IN AN ORGANIZED HEALTH CARE EDUCATION/TRAINING PROGRAM

## 2024-05-20 RX ORDER — MUPIROCIN 20 MG/G
OINTMENT TOPICAL 2 TIMES DAILY
Status: DISCONTINUED | OUTPATIENT
Start: 2024-05-20 | End: 2024-05-20 | Stop reason: HOSPADM

## 2024-05-20 RX ORDER — SODIUM CHLORIDE 9 MG/ML
INJECTION, SOLUTION INTRAVENOUS CONTINUOUS
Status: DISCONTINUED | OUTPATIENT
Start: 2024-05-20 | End: 2024-05-20 | Stop reason: HOSPADM

## 2024-05-20 RX ORDER — PHENYLEPHRINE HYDROCHLORIDE 10 MG/ML
INJECTION INTRAVENOUS
Status: DISCONTINUED | OUTPATIENT
Start: 2024-05-20 | End: 2024-05-20

## 2024-05-20 RX ORDER — DEXAMETHASONE SODIUM PHOSPHATE 4 MG/ML
INJECTION, SOLUTION INTRA-ARTICULAR; INTRALESIONAL; INTRAMUSCULAR; INTRAVENOUS; SOFT TISSUE
Status: DISCONTINUED | OUTPATIENT
Start: 2024-05-20 | End: 2024-05-20

## 2024-05-20 RX ORDER — SODIUM CHLORIDE 0.9 % (FLUSH) 0.9 %
10 SYRINGE (ML) INJECTION
Status: DISCONTINUED | OUTPATIENT
Start: 2024-05-20 | End: 2024-05-25 | Stop reason: HOSPADM

## 2024-05-20 RX ORDER — IBUPROFEN 200 MG
24 TABLET ORAL
Status: DISCONTINUED | OUTPATIENT
Start: 2024-05-20 | End: 2024-05-25 | Stop reason: HOSPADM

## 2024-05-20 RX ORDER — NALOXONE HCL 0.4 MG/ML
0.02 VIAL (ML) INJECTION
Status: DISCONTINUED | OUTPATIENT
Start: 2024-05-20 | End: 2024-05-25 | Stop reason: HOSPADM

## 2024-05-20 RX ORDER — HYDROMORPHONE HYDROCHLORIDE 1 MG/ML
0.2 INJECTION, SOLUTION INTRAMUSCULAR; INTRAVENOUS; SUBCUTANEOUS EVERY 6 HOURS PRN
Status: DISCONTINUED | OUTPATIENT
Start: 2024-05-20 | End: 2024-05-25 | Stop reason: HOSPADM

## 2024-05-20 RX ORDER — LIDOCAINE HYDROCHLORIDE 20 MG/ML
INJECTION, SOLUTION EPIDURAL; INFILTRATION; INTRACAUDAL; PERINEURAL
Status: DISCONTINUED | OUTPATIENT
Start: 2024-05-20 | End: 2024-05-20

## 2024-05-20 RX ORDER — ROCURONIUM BROMIDE 10 MG/ML
INJECTION, SOLUTION INTRAVENOUS
Status: DISCONTINUED | OUTPATIENT
Start: 2024-05-20 | End: 2024-05-20

## 2024-05-20 RX ORDER — FENTANYL CITRATE 50 UG/ML
INJECTION, SOLUTION INTRAMUSCULAR; INTRAVENOUS
Status: DISCONTINUED | OUTPATIENT
Start: 2024-05-20 | End: 2024-05-20

## 2024-05-20 RX ORDER — GLUCAGON 1 MG
1 KIT INJECTION
Status: DISCONTINUED | OUTPATIENT
Start: 2024-05-20 | End: 2024-05-25 | Stop reason: HOSPADM

## 2024-05-20 RX ORDER — ACETAMINOPHEN 10 MG/ML
INJECTION, SOLUTION INTRAVENOUS
Status: DISCONTINUED | OUTPATIENT
Start: 2024-05-20 | End: 2024-05-20

## 2024-05-20 RX ORDER — PROPOFOL 10 MG/ML
VIAL (ML) INTRAVENOUS
Status: DISCONTINUED | OUTPATIENT
Start: 2024-05-20 | End: 2024-05-20

## 2024-05-20 RX ORDER — ONDANSETRON HYDROCHLORIDE 2 MG/ML
INJECTION, SOLUTION INTRAVENOUS
Status: DISCONTINUED | OUTPATIENT
Start: 2024-05-20 | End: 2024-05-20

## 2024-05-20 RX ORDER — SUCCINYLCHOLINE CHLORIDE 20 MG/ML
INJECTION INTRAMUSCULAR; INTRAVENOUS
Status: DISCONTINUED | OUTPATIENT
Start: 2024-05-20 | End: 2024-05-20

## 2024-05-20 RX ORDER — SODIUM CHLORIDE 0.9 % (FLUSH) 0.9 %
10 SYRINGE (ML) INJECTION EVERY 12 HOURS PRN
Status: DISCONTINUED | OUTPATIENT
Start: 2024-05-20 | End: 2024-05-25 | Stop reason: HOSPADM

## 2024-05-20 RX ORDER — ONDANSETRON HYDROCHLORIDE 2 MG/ML
4 INJECTION, SOLUTION INTRAVENOUS EVERY 6 HOURS PRN
Status: DISCONTINUED | OUTPATIENT
Start: 2024-05-20 | End: 2024-05-25 | Stop reason: HOSPADM

## 2024-05-20 RX ORDER — POTASSIUM CHLORIDE 7.45 MG/ML
10 INJECTION INTRAVENOUS ONCE
Status: COMPLETED | OUTPATIENT
Start: 2024-05-20 | End: 2024-05-20

## 2024-05-20 RX ORDER — IBUPROFEN 200 MG
16 TABLET ORAL
Status: DISCONTINUED | OUTPATIENT
Start: 2024-05-20 | End: 2024-05-25 | Stop reason: HOSPADM

## 2024-05-20 RX ORDER — POTASSIUM CHLORIDE 14.9 MG/ML
20 INJECTION INTRAVENOUS ONCE
Status: DISCONTINUED | OUTPATIENT
Start: 2024-05-20 | End: 2024-05-20

## 2024-05-20 RX ADMIN — SODIUM CHLORIDE: 9 INJECTION, SOLUTION INTRAVENOUS at 10:05

## 2024-05-20 RX ADMIN — SUGAMMADEX 200 MG: 100 INJECTION, SOLUTION INTRAVENOUS at 07:05

## 2024-05-20 RX ADMIN — PROPOFOL 50 MG: 10 INJECTION, EMULSION INTRAVENOUS at 06:05

## 2024-05-20 RX ADMIN — POTASSIUM CHLORIDE 10 MEQ: 7.46 INJECTION, SOLUTION INTRAVENOUS at 09:05

## 2024-05-20 RX ADMIN — POTASSIUM CHLORIDE 50 ML/HR: 149 INJECTION, SOLUTION, CONCENTRATE INTRAVENOUS at 06:05

## 2024-05-20 RX ADMIN — FENTANYL CITRATE 25 MCG: 50 INJECTION INTRAMUSCULAR; INTRAVENOUS at 07:05

## 2024-05-20 RX ADMIN — HYDROMORPHONE HYDROCHLORIDE 0.5 MG: 1 INJECTION, SOLUTION INTRAMUSCULAR; INTRAVENOUS; SUBCUTANEOUS at 04:05

## 2024-05-20 RX ADMIN — LIDOCAINE HYDROCHLORIDE 60 MG: 20 INJECTION, SOLUTION EPIDURAL; INFILTRATION; INTRACAUDAL; PERINEURAL at 06:05

## 2024-05-20 RX ADMIN — ONDANSETRON 8 MG: 2 INJECTION, SOLUTION INTRAMUSCULAR; INTRAVENOUS at 07:05

## 2024-05-20 RX ADMIN — PIPERACILLIN AND TAZOBACTAM 4.5 G: 4; .5 INJECTION, POWDER, LYOPHILIZED, FOR SOLUTION INTRAVENOUS; PARENTERAL at 01:05

## 2024-05-20 RX ADMIN — ROCURONIUM BROMIDE 30 MG: 10 INJECTION, SOLUTION INTRAVENOUS at 07:05

## 2024-05-20 RX ADMIN — DEXAMETHASONE SODIUM PHOSPHATE 4 MG: 4 INJECTION, SOLUTION INTRA-ARTICULAR; INTRALESIONAL; INTRAMUSCULAR; INTRAVENOUS; SOFT TISSUE at 06:05

## 2024-05-20 RX ADMIN — VANCOMYCIN HYDROCHLORIDE 1000 MG: 1 INJECTION, POWDER, LYOPHILIZED, FOR SOLUTION INTRAVENOUS at 01:05

## 2024-05-20 RX ADMIN — ONDANSETRON 4 MG: 2 INJECTION INTRAMUSCULAR; INTRAVENOUS at 05:05

## 2024-05-20 RX ADMIN — SUCCINYLCHOLINE CHLORIDE 100 MG: 20 INJECTION, SOLUTION INTRAMUSCULAR; INTRAVENOUS at 06:05

## 2024-05-20 RX ADMIN — HYDROMORPHONE HYDROCHLORIDE 0.2 MG: 1 INJECTION, SOLUTION INTRAMUSCULAR; INTRAVENOUS; SUBCUTANEOUS at 09:05

## 2024-05-20 RX ADMIN — LEUCINE, PHENYLALANINE, LYSINE, METHIONINE, ISOLEUCINE, VALINE, HISTIDINE, THREONINE, TRYPTOPHAN, ALANINE, GLYCINE, ARGININE, PROLINE, SERINE, TYROSINE, SODIUM ACETATE, DIBASIC POTASSIUM PHOSPHATE, MAGNESIUM CHLORIDE, SODIUM CHLORIDE, CALCIUM CHLORIDE, DEXTROSE
311; 238; 247; 170; 255; 247; 204; 179; 77; 880; 438; 489; 289; 213; 17; 297; 261; 51; 77; 33; 5 INJECTION INTRAVENOUS at 04:05

## 2024-05-20 RX ADMIN — FENTANYL CITRATE 50 MCG: 50 INJECTION INTRAMUSCULAR; INTRAVENOUS at 06:05

## 2024-05-20 RX ADMIN — POTASSIUM CHLORIDE 50 ML/HR: 149 INJECTION, SOLUTION, CONCENTRATE INTRAVENOUS at 08:05

## 2024-05-20 RX ADMIN — SODIUM CHLORIDE, SODIUM LACTATE, POTASSIUM CHLORIDE, AND CALCIUM CHLORIDE: .6; .31; .03; .02 INJECTION, SOLUTION INTRAVENOUS at 06:05

## 2024-05-20 RX ADMIN — ACETAMINOPHEN 1000 MG: 10 INJECTION, SOLUTION INTRAVENOUS at 07:05

## 2024-05-20 RX ADMIN — FENTANYL CITRATE 25 MCG: 50 INJECTION INTRAMUSCULAR; INTRAVENOUS at 06:05

## 2024-05-20 RX ADMIN — PHENYLEPHRINE HYDROCHLORIDE 100 MCG: 10 INJECTION INTRAVENOUS at 07:05

## 2024-05-20 RX ADMIN — HYPROMELLOSE 2910 2 DROP: 5 SOLUTION OPHTHALMIC at 06:05

## 2024-05-20 RX ADMIN — POTASSIUM CHLORIDE 50 ML/HR: 149 INJECTION, SOLUTION, CONCENTRATE INTRAVENOUS at 11:05

## 2024-05-20 NOTE — PLAN OF CARE
Problem: Adult Inpatient Plan of Care  Goal: Plan of Care Review  Outcome: Progressing  Goal: Patient-Specific Goal (Individualized)  Outcome: Progressing  Goal: Absence of Hospital-Acquired Illness or Injury  Outcome: Progressing  Goal: Optimal Comfort and Wellbeing  Outcome: Progressing  Goal: Readiness for Transition of Care  Outcome: Progressing     Problem: Fall Injury Risk  Goal: Absence of Fall and Fall-Related Injury  Outcome: Progressing     Problem: Pain Acute  Goal: Optimal Pain Control and Function  Outcome: Progressing     Problem: Skin Injury Risk Increased  Goal: Skin Health and Integrity  Outcome: Progressing     Problem: Sepsis/Septic Shock  Goal: Optimal Coping  Outcome: Progressing  Goal: Absence of Bleeding  Outcome: Progressing  Goal: Blood Glucose Level Within Targeted Range  Outcome: Progressing  Goal: Absence of Infection Signs and Symptoms  Outcome: Progressing  Goal: Optimal Nutrition Intake  Outcome: Progressing     Problem: Infection  Goal: Absence of Infection Signs and Symptoms  Outcome: Progressing     Problem: Intestinal Obstruction  Goal: Optimal Bowel Function  Outcome: Progressing  Goal: Fluid and Electrolyte Balance  Outcome: Progressing  Goal: Absence of Infection Signs and Symptoms  Outcome: Progressing  Goal: Optimize Nutrition Status  Outcome: Progressing  Goal: Optimal Pain Control and Function  Outcome: Progressing

## 2024-05-20 NOTE — NURSING
Surgical team at bedside to transfer patient to OR all consents are signed and in the  chart, family at bedside.

## 2024-05-20 NOTE — ASSESSMENT & PLAN NOTE
Chronic, controlled. Latest blood pressure and vitals reviewed-     Temp:  [97.4 °F (36.3 °C)-98.7 °F (37.1 °C)]   Pulse:  []   Resp:  [14-22]   BP: ()/(50-71)   SpO2:  [93 %-97 %] .   Home meds for hypertension were reviewed and noted below.   Hypertension Medications               lisinopriL 10 MG tablet lisinopriL 10 mg tablet, [RxNorm: 991210]            While in the hospital, will manage blood pressure as follows; Adjust home antihypertensive regimen as follows- hold lisinopril for now    Will utilize p.r.n. blood pressure medication only if patient's blood pressure greater than 180/110 and she develops symptoms such as worsening chest pain or shortness of breath.

## 2024-05-20 NOTE — PROGRESS NOTES
[facility] (3rd Fl)  General Surgery  Progress Note    Subjective:     Interval History:   Patient has small-bowel series done yesterday.  Patient with pain nausea and vomiting when NG was clamped.  Patient denies flatus or bowel movement.  Post-Op Info:  * No surgery found *          Medications:  Continuous Infusions:   Amino acid 4.25% - dextrose 5% (CLINIMIX-E) solution with additives (1L provides 42.5 gm AA, 50 gm CHO (170 kcal/L dextrose), Na 35, K 30, Mg 5, Ca 4.5, Acetate 70, Cl 39, Phos 15)   Intravenous Continuous 100 mL/hr at 05/20/24 0443 New Bag at 05/20/24 0443     Scheduled Meds:  PRN Meds:  Current Facility-Administered Medications:     diatrizoate meglumineand-diatrizoate sodium, 120 mL, Per NG tube, ONCE PRN    HYDROmorphone, 0.5 mg, Intravenous, Q6H PRN    melatonin, 6 mg, Oral, Nightly PRN    ondansetron, 4 mg, Intravenous, Q6H PRN    sodium chloride 0.9%, 10 mL, Intravenous, PRN     Objective:     Vital Signs (Most Recent):  Temp: 97.4 °F (36.3 °C) (05/20/24 0800)  Pulse: 85 (05/20/24 0800)  Resp: (!) 22 (05/20/24 0800)  BP: (!) 93/51 (05/20/24 0800)  SpO2: 95 % (05/20/24 0800) Vital Signs (24h Range):  Temp:  [97.4 °F (36.3 °C)-98.7 °F (37.1 °C)] 97.4 °F (36.3 °C)  Pulse:  [] 85  Resp:  [14-22] 22  SpO2:  [93 %-98 %] 95 %  BP: ()/(50-71) 93/51       Intake/Output Summary (Last 24 hours) at 5/20/2024 0808  Last data filed at 5/20/2024 0541  Gross per 24 hour   Intake 0 ml   Output 400 ml   Net -400 ml       Physical Exam    Patient is sitting up in bed.  NG tube in place.  Bilious aspirate.  Patient looks miserable.  Abdomen remains fairly soft.  No significant tenderness.  Significant Labs:  All pertinent labs from the last 24 hours have been reviewed.    Significant Diagnostics:  I have reviewed all pertinent imaging results/findings within the past 24 hours.    Assessment/Plan:     Active Diagnoses:    Diagnosis Date Noted POA    PRINCIPAL PROBLEM:  Small bowel  obstruction [K56.609] 05/17/2024 Yes    Irritable bowel syndrome with diarrhea [K58.0] 12/13/2022 Yes    Stage 3a chronic kidney disease [N18.31] 05/18/2021 Yes    Anxiety [F41.9] 04/05/2019 Yes    Coronary artery disease involving native coronary artery of native heart without angina pectoris [I25.10] 04/05/2019 Yes    HTN (hypertension) [I10] 08/12/2018 Yes    Hyperlipidemia [E78.5] 08/12/2018 Yes      Problems Resolved During this Admission:       No real improvement since admission.  Small-bowel series concerning for complete obstruction.  Discussed surgery with patient and family.  Will proceed with exploration.  Goals and risks of procedure discussed in detail with the patient.  Patient and family understand and wished to proceed.    Louie Betts MD  General Surgery  [facility] - Med Surg (3rd Fl)

## 2024-05-20 NOTE — NURSING
Nursing Transfer Note      5/20/2024   5:37 PM    Nurse giving handoff:RACHEL Pena, RN  Nurse receiving handoff:LUANA Tierney RN    Reason patient is being transferred: SBO    Transfer From: [facility]    Transfer via stretcher    Transfer with cardiac monitoring    Transported by EMS    Transfer Vital Signs:  See flowsheet    Telemetry: ICU monitor  Order for Tele Monitor? Yes    Additional Lines: Morton Catheter, NGT, PIV x2    4eyes on Skin: yes, mepilex to upper L back, Mepilex to sacrum with healed skin tear, and scabs to bilat gluteal folds, and skin tear to L AC    Medicines sent: none    Any special needs or follow-up needed: surgery consult    Patient belongings transferred with patient: Yes, blanket    Chart send with patient: Yes    Notified: daughter    Patient reassessed at: 1725 on 5/20/24     Upon arrival to floor: cardiac monitor applied, patient oriented to room, call bell in reach, and bed in lowest position

## 2024-05-20 NOTE — ASSESSMENT & PLAN NOTE
Creatine stable for now. BMP reviewed- noted Estimated Creatinine Clearance: 16.4 mL/min (A) (based on SCr of 1.7 mg/dL (H)). according to latest data. Based on current GFR, CKD stage is stage 3 - GFR 30-59.  Monitor UOP and serial BMP and adjust therapy as needed. Renally dose meds. Avoid nephrotoxic medications and procedures.

## 2024-05-20 NOTE — SUBJECTIVE & OBJECTIVE
Interval History: still with abd pain.  She was not passing any stool or gas.  She had a bout of chest pain after drinking the Gastrografin.  EKG shows normal sinus rhythm, no changes.    Review of Systems   Constitutional:  Negative for appetite change and fever.   Respiratory:  Negative for cough.    Cardiovascular:  Negative for chest pain and leg swelling.   Gastrointestinal:  Positive for abdominal pain, nausea and vomiting. Negative for constipation and diarrhea.   Genitourinary:  Negative for dysuria and urgency.   Musculoskeletal:  Negative for gait problem.   Psychiatric/Behavioral:  The patient is not nervous/anxious.      Objective:     Vital Signs (Most Recent):  Temp: 97.4 °F (36.3 °C) (05/20/24 0800)  Pulse: 85 (05/20/24 0800)  Resp: (!) 22 (05/20/24 0800)  BP: (!) 93/51 (05/20/24 0800)  SpO2: 95 % (05/20/24 0800) Vital Signs (24h Range):  Temp:  [97.4 °F (36.3 °C)-98.7 °F (37.1 °C)] 97.4 °F (36.3 °C)  Pulse:  [] 85  Resp:  [14-22] 22  SpO2:  [93 %-97 %] 95 %  BP: ()/(50-71) 93/51     Weight: 50.9 kg (112 lb 4.8 oz)  Body mass index is 23.47 kg/m².    Intake/Output Summary (Last 24 hours) at 5/20/2024 0941  Last data filed at 5/20/2024 0541  Gross per 24 hour   Intake 0 ml   Output 400 ml   Net -400 ml         Physical Exam  Constitutional:       Appearance: Normal appearance.   HENT:      Right Ear: Tympanic membrane normal.      Left Ear: Tympanic membrane normal.   Cardiovascular:      Rate and Rhythm: Normal rate and regular rhythm.      Heart sounds: No murmur heard.     No friction rub. No gallop.   Pulmonary:      Effort: Pulmonary effort is normal.      Comments: Decreased breath sounds in right lower lobe   Abdominal:      Comments: Hypoactive bowel sounds    Musculoskeletal:      Right lower leg: No edema.      Left lower leg: No edema.   Neurological:      Mental Status: She is alert.             Significant Labs: All pertinent labs within the past 24 hours have been  "reviewed.  CBC:   Recent Labs   Lab 05/19/24  0750 05/20/24  0849   WBC 10.25 30.77*   HGB 14.0 14.8   HCT 42.0 43.6    219     CMP:   Recent Labs   Lab 05/19/24  0751 05/20/24  0849     --    K 3.9  --     96   CO2 23  --    GLU 86  --    BUN 32*  --    CREATININE 0.7  --    CALCIUM 9.2  --    PROT 6.0  --    ALBUMIN 3.2*  --    BILITOT 0.5  --    ALKPHOS 47*  --    AST 15  --    ALT 11  --    ANIONGAP 10  --      Urine Studies: No results for input(s): "COLORU", "APPEARANCEUA", "PHUR", "SPECGRAV", "PROTEINUA", "GLUCUA", "KETONESU", "BILIRUBINUA", "OCCULTUA", "NITRITE", "UROBILINOGEN", "LEUKOCYTESUR", "RBCUA", "WBCUA", "BACTERIA", "SQUAMEPITHEL", "HYALINECASTS" in the last 48 hours.    Invalid input(s): "WRIGHTSUR"    Significant Imaging: I have reviewed all pertinent imaging results/findings within the past 24 hours.  I have reviewed and interpreted all pertinent imaging results/findings within the past 24 hours.  "

## 2024-05-20 NOTE — PLAN OF CARE
Problem: Adult Inpatient Plan of Care  Goal: Plan of Care Review  Outcome: Progressing  Flowsheets (Taken 5/20/2024 6578)  Plan of Care Reviewed With:   patient   family  Goal: Patient-Specific Goal (Individualized)  Outcome: Progressing  Goal: Absence of Hospital-Acquired Illness or Injury  Outcome: Progressing  Goal: Optimal Comfort and Wellbeing  Outcome: Progressing  Goal: Readiness for Transition of Care  Outcome: Progressing     Problem: Fall Injury Risk  Goal: Absence of Fall and Fall-Related Injury  Outcome: Progressing     Problem: Pain Acute  Goal: Optimal Pain Control and Function  Outcome: Progressing     Problem: Skin Injury Risk Increased  Goal: Skin Health and Integrity  Outcome: Progressing

## 2024-05-20 NOTE — SUBJECTIVE & OBJECTIVE
Past Medical History:   Diagnosis Date    High cholesterol     Hypertension        Past Surgical History:   Procedure Laterality Date    BACK SURGERY      CHOLECYSTECTOMY      HYSTERECTOMY      NECK SURGERY         Review of patient's allergies indicates:   Allergen Reactions    Iodine and iodide containing products        No current facility-administered medications on file prior to encounter.     Current Outpatient Medications on File Prior to Encounter   Medication Sig    atorvastatin (LIPITOR) 20 MG tablet Take 20 mg by mouth once daily.    cholecalciferol, vitamin D3, (VITAMIN D3) 25 mcg (1,000 unit) capsule Take 1 capsule (1,000 Units total) by mouth once daily.    cholestyramine-aspartame (CHOLESTYRAMINE LIGHT) 4 gram Powd Take 4 g by mouth once daily.    lisinopriL 10 MG tablet lisinopriL 10 mg tablet, [RxNorm: 225860]    primidone (MYSOLINE) 50 MG Tab TAKE 2 TABLETS EVERY EVENING    sertraline (ZOLOFT) 25 MG tablet TAKE 1 TABLET ONE TIME DAILY     Family History    None       Tobacco Use    Smoking status: Never    Smokeless tobacco: Not on file   Substance and Sexual Activity    Alcohol use: No    Drug use: No    Sexual activity: Not on file     Review of Systems   Constitutional: Negative.   HENT: Negative.     Eyes: Negative.    Cardiovascular: Negative.    Respiratory: Negative.     Endocrine: Negative.    Hematologic/Lymphatic: Negative.    Skin: Negative.    Musculoskeletal: Negative.    Gastrointestinal:  Positive for bloating, abdominal pain, change in bowel habit, nausea and vomiting.   Genitourinary: Negative.    Neurological: Negative.    Psychiatric/Behavioral: Negative.     Allergic/Immunologic: Negative.      Objective:     Vital Signs (Most Recent):  Temp: 97.4 °F (36.3 °C) (05/20/24 0800)  Pulse: 85 (05/20/24 0800)  Resp: (!) 22 (05/20/24 0800)  BP: (!) 93/51 (05/20/24 0800)  SpO2: 95 % (05/20/24 0800) Vital Signs (24h Range):  Temp:  [97.4 °F (36.3 °C)-98.7 °F (37.1 °C)] 97.4 °F (36.3  °C)  Pulse:  [] 85  Resp:  [14-22] 22  SpO2:  [93 %-97 %] 95 %  BP: ()/(50-71) 93/51     Weight: 50.9 kg (112 lb 4.8 oz)  Body mass index is 23.47 kg/m².    SpO2: 95 %         Intake/Output Summary (Last 24 hours) at 5/20/2024 0910  Last data filed at 5/20/2024 0541  Gross per 24 hour   Intake 0 ml   Output 400 ml   Net -400 ml       Lines/Drains/Airways       Drain  Duration                  NG/OG Tube 05/17/24 1350 Jacome 16 Fr. Right nostril 2 days              Peripheral Intravenous Line  Duration                  Peripheral IV - Single Lumen 05/18/24 1715 Anterior;Left;Proximal Forearm 1 day                     Physical Exam  Constitutional:       General: She is not in acute distress.     Appearance: Normal appearance. She is normal weight. She is not ill-appearing, toxic-appearing or diaphoretic.   Neck:      Vascular: No carotid bruit.   Cardiovascular:      Rate and Rhythm: Normal rate and regular rhythm.      Pulses: Normal pulses.      Heart sounds: Normal heart sounds. No murmur heard.     No friction rub. No gallop.   Pulmonary:      Effort: Pulmonary effort is normal.      Breath sounds: Normal breath sounds.   Musculoskeletal:         General: Normal range of motion.      Cervical back: Normal range of motion and neck supple.      Right lower leg: No edema.      Left lower leg: No edema.   Skin:     General: Skin is warm and dry.      Capillary Refill: Capillary refill takes less than 2 seconds.   Neurological:      General: No focal deficit present.      Mental Status: She is alert.   Psychiatric:         Mood and Affect: Mood normal.         Behavior: Behavior normal.         Thought Content: Thought content normal.         Judgment: Judgment normal.      Significant Labs:   Recent Lab Results         05/19/24  1543   05/19/24  1237        QRS Duration 74   76       [facility] QTC Calculation 411   416               Significant Imaging: CT scan: CT ABDOMEN PELVIS WITH CONTRAST: No results found  for this visit on 05/17/24. and CT ABDOMEN PELVIS WITHOUT CONTRAST:   Results for orders placed or performed during the hospital encounter of 05/17/24   CT Abdomen Pelvis  Without Contrast    Narrative    EXAMINATION:  CT ABDOMEN PELVIS WITHOUT CONTRAST    CLINICAL HISTORY:  Abdominal pain, acute, nonlocalized;    TECHNIQUE:  Low dose axial images, sagittal and coronal reformations were obtained from the lung bases to the pubic symphysis.  Oral contrast was not administered.    COMPARISON:  None    FINDINGS:  Bibasilar subsegmental atelectasis.  No consolidation or pleural effusions.  Base of the heart shows calcified coronary artery disease.  Calcified atheromatous disease affects the aorta and its major branch vessels.    The gallbladder has been removed.  No intrahepatic or extrahepatic biliary ductal dilatation is identified.  The unenhanced liver, spleen, pancreas and adrenal glands appear normal.  Both kidneys are normal in size, shape and contour.  There is a small hypodensity at the lower pole of the left kidney, incompletely evaluated.  No nephrolithiasis, ureterolithiasis, hydronephrosis or hydroureter is seen.  The urinary bladder is partially distended and appears normal.  The uterus has been removed.  Low-density structure along the left pelvic sidewall measures 4.1 x 1.8 cm in transverse dimension and approximately 3.9 cm in craniocaudal dimension, indeterminate, appearing separate from the bowel and unchanged as compared to a CT scan of the lumbar spine from 2019.    There are multiple dilated loops of small bowel which are fluid-filled throughout the abdomen and pelvis with decompression of distal small bowel loops concerning for obstruction.  The transition zone is difficult to identify however is likely somewhere in the lower pelvis.  Constipation.  Diverticulosis coli without diverticulitis.  No free air or free fluid.  No pathologically enlarged abdominal or pelvic lymph nodes are seen.    The  bones are osteopenic and show age-appropriate degenerative change.  Compression deformity of the inferior endplate of L2, stable.      Impression    Imaging findings suggestive for a small-bowel obstruction with the zone of transition presumably in the lower pelvis at midline (series 2, image 132).  No free air is seen.    Constipation.  Diverticulosis coli without diverticulitis.    Additional nonemergent findings as above.    COMMUNICATION  This critical result was discovered/received at 12:20.  The critical information above was relayed directly by me via secure chat to Dr. Lovely Bledsoe on 05/17/2024 at 12:22.      Electronically signed by: Rosa Galo MD  Date:    05/17/2024  Time:    12:22   , Echocardiogram: 2D echo with color flow doppler:   Results for orders placed or performed during the hospital encounter of 08/11/18   2D echo with color flow doppler    Narrative           Transthoracic Echocardiogram Report    Patient Name  : [place] [address]    Phone: [phone]    Interpreting Physician: ALEXANDER IRAHETA MD  Demographics:  Name:  ARMANI GRECO   YOB: 1934  Age/Sex:  84, Female   Height: 5 ft   Weight:   118 pounds    BSA: 1.51 cc/m?  BMI:      23   Date of Study: 08/12/2018  Medical Record No:   [id number]   Location: [facility]  Referring Physician:   SARA REINOSO   Technologist: ASHER Puga   Study:   Trans Thoracic Echocardiogram  Study Quality:   Average  Procedure  Type: Adult TTE (Date Study Ordered : 08/12/2018)  Components: 2D,Color Flow Doppler,Doppler,M-mode,TDI(Tissue Doppler   Imaging)   Referral diagnosis  Syncope  Hemodynamics  Heart rate: 54 beats/min  Blood pressure:  100/53 mmHg  ECG:     Final Impressions  1. The study quality is average.   2. Global left ventricular systolic function is normal. The left   ventricular ejection fraction is 66%.   3. Right ventricular systolic function is normal.  Right ventricular   diastolic dimension is 2.9 cms.    4. Mild calcification of the aortic valve is noted with adequate   cuspal excursion. Mild mitral annular calcification is noted.    5. Mild to moderate (1-2+) tricuspid regurgitation. Trace mitral   regurgitation.   6. The pulmonary artery systolic pressure is 29 mmHg.     Intra-modality comparison (Echocardiogram)  This echocardiogram when compared with the latest echocardiogram-TTE   ([facility] [place]) dated 7/6/2016 shows:    1. Ejection fraction essentially remained unchanged (55% previous   study, 66% current study).    2. Mitral valve area by pressure halftime has decreased from 4.9 cm?   to 3.5 cm?.      Findings    Left Atrium  The left atrium is decreased in size. Left atrial diameter is 2.4 cms.     Right Atrium  The right atrium is normal.     Left Ventricle  The left ventricle is normal in size. Left ventricular diastolic   dimension is 4.3 cms. Left ventricular systolic dimension is 2.7 cms.   Left ventricular diastolic septal thickness is 1 cm. Left ventricular   diastolic postero basal free wall thickness is 1 cm. Global left   ventricular systolic function is normal. The left ventricular   fractional shortening is 36.4%. The left ventricular ejection fraction   is 66%. Left ventricular outflow tract VTI is 25.7 cms. Left   ventricular outflow tract peak velocity is 1.1 m/s. Left ventricular   peak gradient is 4 mmHg. Left ventricular outflow tract mean velocity   is 0.7 m/s. Left ventricular mean gradient is 2 mmHg.      Right Ventricle  Right ventricular systolic function is normal. Right ventricular   diastolic dimension is 2.9 cms. Right ventricular systolic pressure is   29 mmHg.      Atrial Septum  The atrial septum is normal.     Ventricular Septum  The ventricular septum is normal.     Pulmonary Artery  The pulmonary artery systolic pressure is 29 mmHg.     Pulmonary Vein  The pulmonary vein appears normal.     IVC  The inferior vena cava is  normal.     Pulmonic Valve  The peak velocity is 0.7 m/s. The mean velocity is 0.5 m/s. The peak   trans pulmonic gradient is 2 mmHg. The mean trans pulmonic gradient is   1 mmHg.      Tricuspid Valve  Evidence of tricuspid regurgitation is present. Mild to moderate   (1-2+) tricuspid regurgitation. The peak tricuspid regurgitant   velocity is 2.2 m/s. The peak trans tricuspid gradient is 19 mmHg.      Mitral Valve  Mild mitral annular calcification is noted. Mitral regurgitation is   noted. Trace mitral regurgitation. The pressure half time is 63 ms.   The deceleration time is 201 ms. The E velocity is 1 m/s. The A   velocity is 1.1 m/s.      Aortic Valve  The trans-aortic peak velocity is 1.5 m/s. The trans-aortic peak   gradient is 9 mmHg. The trans-aortic mean velocity is 1.1 m/s. The   trans-aortic mean gradient is 5 mmHg. Aortic valve VTI measures 40.8   cms. Mild calcification of the aortic valve is noted with adequate   cuspal excursion.      Aorta  Aortic root diameter is 2.2 cms.     Pericardium  The pericardium is normal in appearance.     Measurements  Left Atrium  Diameter   2.4cm(s)    Left Ventricle  End Diastolic Dimension   4.3cm(s)  End Systolic Dimension   2.7cm(s)  Posterior Basal Free Wall Thickness   1cm(s)  End Diastolic Septal Thickness   1cm(s)  Ejection Fraction   66%  Fractional Shortening   36.4    Right Ventricle  Diastolic Diameter   2.9cm(s)    Pulmonic Valve  Peak Pulmonic Velocity   0.7m/s  Mean Pulmonic Velocity   0.5m/s  Peak Trans Pulmonic Gradient   2mmHg  Mean Trans Pulmonic Gradient   1mmHg    Tricuspid Valve  Peak Tricuspid Regurgitant Velocity   2.2m/s  Peak Tricuspid Regurgitant Gradient   19mmHg  Pulmonary Artery Systolic Pressure  29mmHg    Mitral Valve  Pressure Half Time   63ms  Deceleration Time   201ms  A Velocity   1.1m/s  E Velocity   1m/s  Area By Pressure Half Time   3.5cm?    Aortic Valve  Trans Aortic Peak Velocity   1.5m/s  Trans Aortic Mean Velocity    1.1m/s  Trans Aortic Peak Gradient   9mmHg  Trans Aortic Mean Gradient   5mmHg  Aortic Valve VTI   40.8cm(s)        Electronically Authenticated by  ALEXANDER IRAHETA MD  08/12/2018 , 13:56      and Transthoracic echo (TTE) complete (Cupid Only): No results found for this or any previous visit., and X-Ray: CXR: X-Ray Chest 1 View (CXR): No results found for this visit on 05/17/24. and X-Ray Chest PA and Lateral (CXR): No results found for this visit on 05/17/24. and KUB:  With X-Ray Abdomen AP 1 View (KUB):   Results for orders placed or performed during the hospital encounter of 05/17/24   X-Ray Abdomen AP 1 View (KUB)    Narrative    EXAMINATION:  XR ABDOMEN AP 1 VIEW    TECHNIQUE:  XR ABDOMEN AP 1 VIEW    COMPARISON:  05/17/2024    FINDINGS:  There is been interval placement of an enteric tube which terminates in the gastric body.      Impression    As above.      Electronically signed by: Rosa Galo MD  Date:    05/17/2024  Time:    14:19

## 2024-05-20 NOTE — ANESTHESIA PREPROCEDURE EVALUATION
05/20/2024  Alexandra Gleason is a 89 y.o., female here for emergent ex lap for small bowel obstruction. NG tube in place      Pre-op Assessment    I have reviewed the Patient Summary Reports.    I have reviewed the NPO Status.   I have reviewed the Medications.     Review of Systems  Anesthesia Hx:  No problems with previous Anesthesia                Social:  Non-Smoker, No Alcohol Use       Hematology/Oncology:  Hematology Normal   Oncology Normal                                   EENT/Dental:  EENT/Dental Normal           Cardiovascular:     Hypertension   CAD  asymptomatic         hyperlipidemia    Mild cad not warranting pci  Normal ef                          Pulmonary:  Pulmonary Normal                       Renal/:     ALEXEY             Hepatic/GI:        SBO          Musculoskeletal:  Musculoskeletal Normal                Neurological:  Neurology Normal                                      Endocrine:  Endocrine Normal            Dermatological:  Skin Normal    Psych:  Psychiatric Normal                    Physical Exam  General: Well nourished and Cooperative    Airway:  Mallampati: II   Mouth Opening: Normal  TM Distance: Normal  Tongue: Normal  Neck ROM: Normal ROM        Anesthesia Plan  Type of Anesthesia, risks & benefits discussed:    Anesthesia Type: Gen ETT  Intra-op Monitoring Plan: Standard ASA Monitors  Post Op Pain Control Plan: multimodal analgesia  Induction:  IV  Airway Plan: Video and Direct, Post-Induction  Informed Consent: Informed consent signed with the Patient and all parties understand the risks and agree with anesthesia plan.  All questions answered.   ASA Score: 3 Emergent    Ready For Surgery From Anesthesia Perspective.     .

## 2024-05-20 NOTE — NURSING
Received call from transfer center. Bed assignment given.patient assigned . Call report to [phone]

## 2024-05-20 NOTE — PLAN OF CARE
Problem: Adult Inpatient Plan of Care  Goal: Plan of Care Review  Outcome: Progressing  Goal: Patient-Specific Goal (Individualized)  Outcome: Progressing  Goal: Absence of Hospital-Acquired Illness or Injury  Outcome: Progressing  Goal: Optimal Comfort and Wellbeing  Outcome: Progressing  Goal: Readiness for Transition of Care  Outcome: Progressing     Problem: Sepsis/Septic Shock  Goal: Optimal Coping  Outcome: Progressing  Goal: Absence of Bleeding  Outcome: Progressing  Goal: Blood Glucose Level Within Targeted Range  Outcome: Progressing  Goal: Absence of Infection Signs and Symptoms  Outcome: Progressing  Goal: Optimal Nutrition Intake  Outcome: Progressing     Problem: Fall Injury Risk  Goal: Absence of Fall and Fall-Related Injury  Outcome: Progressing     Problem: Skin Injury Risk Increased  Goal: Skin Health and Integrity  Outcome: Progressing

## 2024-05-20 NOTE — CONSULTS
[place] [facility] Surg (3rd Fl)  Cardiology  Consult Note    Patient Name: Alexandra Gleason  MRN: [id number]  Admission Date: 5/17/2024  Hospital Length of Stay: 3 days  Code Status: Full Code   Attending Provider: Lebron Olivares MD   Consulting Provider: Elias Su NP  Primary Care Physician: Carla Butler MD  Principal Problem:Small bowel obstruction    Patient information was obtained from patient, past medical records, and ER records.     Consults  Subjective:     Chief Complaint:  Abdominal pain, nausea, vomiting    HPI:   89-year-old female with history of CAD, hypertension, dyslipidemia, currently admitted for small-bowel obstruction with plans for surgical exploration today.  [facility] consulted for CRA.    Past Medical History:   Diagnosis Date    High cholesterol     Hypertension        Past Surgical History:   Procedure Laterality Date    BACK SURGERY      CHOLECYSTECTOMY      HYSTERECTOMY      NECK SURGERY         Review of patient's allergies indicates:   Allergen Reactions    Iodine and iodide containing products        No current facility-administered medications on file prior to encounter.     Current Outpatient Medications on File Prior to Encounter   Medication Sig    atorvastatin (LIPITOR) 20 MG tablet Take 20 mg by mouth once daily.    cholecalciferol, vitamin D3, (VITAMIN D3) 25 mcg (1,000 unit) capsule Take 1 capsule (1,000 Units total) by mouth once daily.    cholestyramine-aspartame (CHOLESTYRAMINE LIGHT) 4 gram Powd Take 4 g by mouth once daily.    lisinopriL 10 MG tablet lisinopriL 10 mg tablet, [RxNorm: 019129]    primidone (MYSOLINE) 50 MG Tab TAKE 2 TABLETS EVERY EVENING    sertraline (ZOLOFT) 25 MG tablet TAKE 1 TABLET ONE TIME DAILY     Family History    None       Tobacco Use    Smoking status: Never    Smokeless tobacco: Not on file   Substance and Sexual Activity    Alcohol use: No    Drug use: No    Sexual activity: Not on file     Review of Systems   Constitutional: Negative.    HENT: Negative.     Eyes: Negative.    Cardiovascular: Negative.    Respiratory: Negative.     Endocrine: Negative.    Hematologic/Lymphatic: Negative.    Skin: Negative.    Musculoskeletal: Negative.    Gastrointestinal:  Positive for bloating, abdominal pain, change in bowel habit, nausea and vomiting.   Genitourinary: Negative.    Neurological: Negative.    Psychiatric/Behavioral: Negative.     Allergic/Immunologic: Negative.      Objective:     Vital Signs (Most Recent):  Temp: 97.4 °F (36.3 °C) (05/20/24 0800)  Pulse: 85 (05/20/24 0800)  Resp: (!) 22 (05/20/24 0800)  BP: (!) 93/51 (05/20/24 0800)  SpO2: 95 % (05/20/24 0800) Vital Signs (24h Range):  Temp:  [97.4 °F (36.3 °C)-98.7 °F (37.1 °C)] 97.4 °F (36.3 °C)  Pulse:  [] 85  Resp:  [14-22] 22  SpO2:  [93 %-97 %] 95 %  BP: ()/(50-71) 93/51     Weight: 50.9 kg (112 lb 4.8 oz)  Body mass index is 23.47 kg/m².    SpO2: 95 %         Intake/Output Summary (Last 24 hours) at 5/20/2024 0910  Last data filed at 5/20/2024 0541  Gross per 24 hour   Intake 0 ml   Output 400 ml   Net -400 ml       Lines/Drains/Airways       Drain  Duration                  NG/OG Tube 05/17/24 1350 Jacome 16 Fr. Right nostril 2 days              Peripheral Intravenous Line  Duration                  Peripheral IV - Single Lumen 05/18/24 1715 Anterior;Left;Proximal Forearm 1 day                     Physical Exam  Constitutional:       General: She is not in acute distress.     Appearance: Normal appearance. She is normal weight. She is not ill-appearing, toxic-appearing or diaphoretic.   Neck:      Vascular: No carotid bruit.   Cardiovascular:      Rate and Rhythm: Normal rate and regular rhythm.      Pulses: Normal pulses.      Heart sounds: Normal heart sounds. No murmur heard.     No friction rub. No gallop.   Pulmonary:      Effort: Pulmonary effort is normal.      Breath sounds: Normal breath sounds.   Musculoskeletal:         General: Normal range of motion.      Cervical  back: Normal range of motion and neck supple.      Right lower leg: No edema.      Left lower leg: No edema.   Skin:     General: Skin is warm and dry.      Capillary Refill: Capillary refill takes less than 2 seconds.   Neurological:      General: No focal deficit present.      Mental Status: She is alert.   Psychiatric:         Mood and Affect: Mood normal.         Behavior: Behavior normal.         Thought Content: Thought content normal.         Judgment: Judgment normal.      Significant Labs:   Recent Lab Results         05/19/24  1543   05/19/24  1237        QRS Duration 74   76       [facility] QTC Calculation 411   416               Significant Imaging: CT scan: CT ABDOMEN PELVIS WITH CONTRAST: No results found for this visit on 05/17/24. and CT ABDOMEN PELVIS WITHOUT CONTRAST:   Results for orders placed or performed during the hospital encounter of 05/17/24   CT Abdomen Pelvis  Without Contrast    Narrative    EXAMINATION:  CT ABDOMEN PELVIS WITHOUT CONTRAST    CLINICAL HISTORY:  Abdominal pain, acute, nonlocalized;    TECHNIQUE:  Low dose axial images, sagittal and coronal reformations were obtained from the lung bases to the pubic symphysis.  Oral contrast was not administered.    COMPARISON:  None    FINDINGS:  Bibasilar subsegmental atelectasis.  No consolidation or pleural effusions.  Base of the heart shows calcified coronary artery disease.  Calcified atheromatous disease affects the aorta and its major branch vessels.    The gallbladder has been removed.  No intrahepatic or extrahepatic biliary ductal dilatation is identified.  The unenhanced liver, spleen, pancreas and adrenal glands appear normal.  Both kidneys are normal in size, shape and contour.  There is a small hypodensity at the lower pole of the left kidney, incompletely evaluated.  No nephrolithiasis, ureterolithiasis, hydronephrosis or hydroureter is seen.  The urinary bladder is partially distended and appears normal.  The uterus has been  removed.  Low-density structure along the left pelvic sidewall measures 4.1 x 1.8 cm in transverse dimension and approximately 3.9 cm in craniocaudal dimension, indeterminate, appearing separate from the bowel and unchanged as compared to a CT scan of the lumbar spine from 2019.    There are multiple dilated loops of small bowel which are fluid-filled throughout the abdomen and pelvis with decompression of distal small bowel loops concerning for obstruction.  The transition zone is difficult to identify however is likely somewhere in the lower pelvis.  Constipation.  Diverticulosis coli without diverticulitis.  No free air or free fluid.  No pathologically enlarged abdominal or pelvic lymph nodes are seen.    The bones are osteopenic and show age-appropriate degenerative change.  Compression deformity of the inferior endplate of L2, stable.      Impression    Imaging findings suggestive for a small-bowel obstruction with the zone of transition presumably in the lower pelvis at midline (series 2, image 132).  No free air is seen.    Constipation.  Diverticulosis coli without diverticulitis.    Additional nonemergent findings as above.    COMMUNICATION  This critical result was discovered/received at 12:20.  The critical information above was relayed directly by me via secure chat to Dr. Lovely Bledsoe on 05/17/2024 at 12:22.      Electronically signed by: Rosa Galo MD  Date:    05/17/2024  Time:    12:22   , Echocardiogram: 2D echo with color flow doppler:   Results for orders placed or performed during the hospital encounter of 08/11/18   2D echo with color flow doppler    Narrative           Transthoracic Echocardiogram Report    Patient Name  : ARMANI GRECO  [id number] [place] [address]    Phone: [phone]    Interpreting Physician: ALEXANDER IRAHETA MD  Demographics:  Name:  ARMANI GRECO   YOB: 1934  Age/Sex:  84, Female   Height: 5 ft   Weight:   118  pounds    BSA: 1.51 cc/m?  BMI:      23   Date of Study: 08/12/2018  Medical Record No:   [id number]   Location: [id number]  Referring Physician:   SARA REINOSO   Technologist: ASHER Puga   Study:   Trans Thoracic Echocardiogram  Study Quality:   Average  Procedure  Type: Adult TTE (Date Study Ordered : 08/12/2018)  Components: 2D,Color Flow Doppler,Doppler,M-mode,TDI(Tissue Doppler   Imaging)   Referral diagnosis  Syncope  Hemodynamics  Heart rate: 54 beats/min  Blood pressure:  100/53 mmHg  ECG:     Final Impressions  1. The study quality is average.   2. Global left ventricular systolic function is normal. The left   ventricular ejection fraction is 66%.   3. Right ventricular systolic function is normal. Right ventricular   diastolic dimension is 2.9 cms.    4. Mild calcification of the aortic valve is noted with adequate   cuspal excursion. Mild mitral annular calcification is noted.    5. Mild to moderate (1-2+) tricuspid regurgitation. Trace mitral   regurgitation.   6. The pulmonary artery systolic pressure is 29 mmHg.     Intra-modality comparison (Echocardiogram)  This echocardiogram when compared with the latest echocardiogram-TTE   ([facility]) dated 7/6/2016 shows:    1. Ejection fraction essentially remained unchanged (55% previous   study, 66% current study).    2. Mitral valve area by pressure halftime has decreased from 4.9 cm?   to 3.5 cm?.      Findings    Left Atrium  The left atrium is decreased in size. Left atrial diameter is 2.4 cms.     Right Atrium  The right atrium is normal.     Left Ventricle  The left ventricle is normal in size. Left ventricular diastolic   dimension is 4.3 cms. Left ventricular systolic dimension is 2.7 cms.   Left ventricular diastolic septal thickness is 1 cm. Left ventricular   diastolic postero basal free wall thickness is 1 cm. Global left   ventricular systolic function is normal. The left ventricular   fractional shortening is 36.4%. The left ventricular  ejection fraction   is 66%. Left ventricular outflow tract VTI is 25.7 cms. Left   ventricular outflow tract peak velocity is 1.1 m/s. Left ventricular   peak gradient is 4 mmHg. Left ventricular outflow tract mean velocity   is 0.7 m/s. Left ventricular mean gradient is 2 mmHg.      Right Ventricle  Right ventricular systolic function is normal. Right ventricular   diastolic dimension is 2.9 cms. Right ventricular systolic pressure is   29 mmHg.      Atrial Septum  The atrial septum is normal.     Ventricular Septum  The ventricular septum is normal.     Pulmonary Artery  The pulmonary artery systolic pressure is 29 mmHg.     Pulmonary Vein  The pulmonary vein appears normal.     IVC  The inferior vena cava is normal.     Pulmonic Valve  The peak velocity is 0.7 m/s. The mean velocity is 0.5 m/s. The peak   trans pulmonic gradient is 2 mmHg. The mean trans pulmonic gradient is   1 mmHg.      Tricuspid Valve  Evidence of tricuspid regurgitation is present. Mild to moderate   (1-2+) tricuspid regurgitation. The peak tricuspid regurgitant   velocity is 2.2 m/s. The peak trans tricuspid gradient is 19 mmHg.      Mitral Valve  Mild mitral annular calcification is noted. Mitral regurgitation is   noted. Trace mitral regurgitation. The pressure half time is 63 ms.   The deceleration time is 201 ms. The E velocity is 1 m/s. The A   velocity is 1.1 m/s.      Aortic Valve  The trans-aortic peak velocity is 1.5 m/s. The trans-aortic peak   gradient is 9 mmHg. The trans-aortic mean velocity is 1.1 m/s. The   trans-aortic mean gradient is 5 mmHg. Aortic valve VTI measures 40.8   cms. Mild calcification of the aortic valve is noted with adequate   cuspal excursion.      Aorta  Aortic root diameter is 2.2 cms.     Pericardium  The pericardium is normal in appearance.     Measurements  Left Atrium  Diameter   2.4cm(s)    Left Ventricle  End Diastolic Dimension   4.3cm(s)  End Systolic Dimension   2.7cm(s)  Posterior Basal Free  Wall Thickness   1cm(s)  End Diastolic Septal Thickness   1cm(s)  Ejection Fraction   66%  Fractional Shortening   36.4    Right Ventricle  Diastolic Diameter   2.9cm(s)    Pulmonic Valve  Peak Pulmonic Velocity   0.7m/s  Mean Pulmonic Velocity   0.5m/s  Peak Trans Pulmonic Gradient   2mmHg  Mean Trans Pulmonic Gradient   1mmHg    Tricuspid Valve  Peak Tricuspid Regurgitant Velocity   2.2m/s  Peak Tricuspid Regurgitant Gradient   19mmHg  Pulmonary Artery Systolic Pressure  29mmHg    Mitral Valve  Pressure Half Time   63ms  Deceleration Time   201ms  A Velocity   1.1m/s  E Velocity   1m/s  Area By Pressure Half Time   3.5cm?    Aortic Valve  Trans Aortic Peak Velocity   1.5m/s  Trans Aortic Mean Velocity   1.1m/s  Trans Aortic Peak Gradient   9mmHg  Trans Aortic Mean Gradient   5mmHg  Aortic Valve VTI   40.8cm(s)        Electronically Authenticated by  ALEXANDER IRAHETA MD  08/12/2018 , 13:56      and Transthoracic echo (TTE) complete (Cupid Only): No results found for this or any previous visit., and X-Ray: CXR: X-Ray Chest 1 View (CXR): No results found for this visit on 05/17/24. and X-Ray Chest PA and Lateral (CXR): No results found for this visit on 05/17/24. and KUB:  With X-Ray Abdomen AP 1 View (KUB):   Results for orders placed or performed during the hospital encounter of 05/17/24   X-Ray Abdomen AP 1 View (KUB)    Narrative    EXAMINATION:  XR ABDOMEN AP 1 VIEW    TECHNIQUE:  XR ABDOMEN AP 1 VIEW    COMPARISON:  05/17/2024    FINDINGS:  There is been interval placement of an enteric tube which terminates in the gastric body.      Impression    As above.      Electronically signed by: Rosa Galo MD  Date:    05/17/2024  Time:    14:19     Assessment and Plan:     No notes have been filed under this hospital service.  Service: Cardiology      VTE Risk Mitigation (From admission, onward)           Ordered     IP VTE HIGH RISK PATIENT  Once         05/17/24 1258     Place sequential compression device  Until  discontinued         05/17/24 1258                  Current Facility-Administered Medications   Medication    Amino acid 4.25% - dextrose 5% (CLINIMIX-E) solution with additives (1L provides 42.5 gm AA, 50 gm CHO (170 kcal/L dextrose), Na 35, K 30, Mg 5, Ca 4.5, Acetate 70, Cl 39, Phos 15)    ceFAZolin 2 g in dextrose 5 % in water (D5W) 50 mL IVPB (MB+)    diatrizoate meglumineand-diatrizoate sodium (GASTROVIEW) solution 120 mL    HYDROmorphone injection 0.5 mg    melatonin tablet 6 mg    ondansetron injection 4 mg    sodium chloride 0.9% flush 10 mL      PET perfusion 11/15/2019: Normal perfusion study with no perfusion defects noted.    Assessment:  Patient lying comfortably in bed in no acute distress.  Denies chest pain, shortness of breath, orthopnea, palpitations, edema, PND.  Blood pressure marginal at present.  EKG reveals normal sinus rhythm with no acute ischemic change or ST elevation.  Mild IVCD noted.    No signs of Heart failure. Echocardiogram pending.    Patient is considered elevated risk secondary to advanced age and pre-existing comorbidities. Risk is static and non modifiable.  This also should not prohibit any necessary surgical intervention.  Recommend diligent DVT prophylaxis and cardiac monitoring perioperatively, in ICU if unstable in any way.    DX:    SBO  CAD, stable, normal PET perfusion 2019, [facility] July 2016 40% ostial and proximal RCA, 30% mid LAD.  Hypertension, controlled, BP currently marginal.    Dyslipidemia, , HDL 64, LDL 75, FLP August 2023        Thank you for your consult. I will follow-up with patient. Please contact us if you have any additional questions.      Transcribed by  Elias Su NP for Dr JEANETTE Urbina  Cardiology   [facility] (3rd Fl)  I attest that I have personally seen and examined this patient. I have reviewed and discussed the management in detail as outlined above.

## 2024-05-20 NOTE — PROGRESS NOTES
Patient discuss further with primary team and anesthesia.  Morning labs reviewed. Patient appears completely obstructed and surgery recommended.  Believe that transfer to higher level of care warranted  for postoperative management.

## 2024-05-20 NOTE — PLAN OF CARE
Care team at bedside, discussed plan of care with patient / family.  Discharge planning initiated. Patient provided with Case Management contact information and encouraged to call with concerns or questions.  Will continue to follow for duration of stay.

## 2024-05-20 NOTE — NURSING
Notified NICK Ortiz that patient loss IV access. Informed her I will call OR to check with anesthesia for status of PICC line insertion.

## 2024-05-20 NOTE — PROVIDER TRANSFER
"   Outside Transfer Acceptance Note / Regional Referral Center    Referring facility: [facility]   Referring provider: GORDO MCHUGH  Accepting facility: [facility]  Accepting provider: Tera Ricci MD  Admitting provider:   Reason for transfer: Higher LOC    Transfer diagnosis: Small bowel obstruction  Transfer specialty requested: General Surgery  Transfer specialty notified: Yes  Transfer level: NUMBER 1-5: 2  Bed type requested: ICU  Isolation status: No active isolations   Admission class or status: IP- Inpatient      Narrative     Alexandra Gleason is a 89yo F  who is currently admitted to  at [facility] ED with a small bowel obstruction. Per referring PA:  "She has a 3 day history of lower abd pain, bloating, diarrhea, nausea and vomiting.  Workup revealed SBO with transition zone on CT of Abd.  Being admitted for surgical consultation, NGT suction, IVF's, NPO.  Patient still is not passing gas or stool.  Abdominal x-ray looks unchanged.  NG tube still working well and decompressing her stomach.  Her pain is well controlled using Dilaudid and Zofran.  Surgery is following along.  5/20: PT HD stable on room air.  Small bowel follow through with continued small bowel obstruction. NG tube in place."    At time of transfer request, VSS, though BP soft.  Labs remarkable for WBCs 30.77 (acute rise from 10.25 one day prior), Cr 1.7, and BUN 74. General surgery at [place] recommending surgical intervention, though feels pt is too high risk for this to be performed at their facility.     [facility] contacted to facilitate transfer for higher LOC, and Dr. Osman with [facility] surgery agreed to consult. Pt deemed appropriate for transfer to  at [facility] for further care and management.      Objective     Vitals: Temp: 98.6 °F (37 °C) (05/20/24 1007)  Pulse: 76 (05/20/24 1007)  Resp: 15 (05/20/24 1007)  BP: (!) 108/51 (05/20/24 1007)  SpO2: 96 % (05/20/24 1007)  Recent Labs: All pertinent labs " within the past 24 hours have been reviewed.         IV access:        Peripheral IV - Single Lumen 05/18/24 1715 Anterior;Left;Proximal Forearm (Active)   Site Assessment Clean;Dry;Intact;No redness;No swelling 05/20/24 0338   Extremity Assessment Distal to IV No abnormal discoloration;No redness;No swelling;No warmth 05/19/24 2000   Line Status Infusing 05/20/24 0338   Dressing Status Clean;Dry;Intact 05/20/24 0338   Dressing Intervention Integrity maintained 05/20/24 0338       Allergies:   Review of patient's allergies indicates:   Allergen Reactions    Iodine and iodide containing products       NPO: Yes    Anticoagulation:   Anticoagulants       None             Instructions      [facility]  Admit to Hospital Medicine  Upon patient arrival to floor, please contact Hospital Medicine on call.       Tera Ricci MD  Attending Physician  Medical Director - [facility]  Department of Hospital Medicine  5/20/2024

## 2024-05-20 NOTE — ASSESSMENT & PLAN NOTE
Consult general surgery  NGT suction overnight  IVF's  Check labs and abd xray in the morning  Start Clinimix at 100 cc/hour  Check small bowel series    5/20 Continued small bowel obstruction on small bowel series.  Due to age, code status, hypotension, leukocytosis general surgery would like to transfer to higher level of care.

## 2024-05-20 NOTE — NURSING
Report taken from  Kayla Pena RN at [facility] room 308 [facility]

## 2024-05-20 NOTE — HPI
89-year-old female with history of CAD, hypertension, dyslipidemia, currently admitted for small-bowel obstruction with plans for surgical exploration today.  [facility] consulted for CRA.

## 2024-05-20 NOTE — ASSESSMENT & PLAN NOTE
"This patient does have evidence of infective focus  My overall impression is sepsis.  Source: Unknown  Antibiotics given-   Antibiotics (72h ago, onward)      Start     Stop Route Frequency Ordered    05/20/24 1100  piperacillin-tazobactam (ZOSYN) 4.5 g in dextrose 5 % in water (D5W) 100 mL IVPB (MB+)         -- IV Every 12 hours 05/20/24 0944          Latest lactate reviewed-  No results for input(s): "LACTATE", "POCLAC" in the last 72 hours.  Organ dysfunction indicated by Acute kidney injury    Fluid challenge Will give IV fluids maintenance.MAP greater than 65. Lactic acid, procal  CXR, U/A and blood cultures pending     Post- resuscitation assessment No - Post resuscitation assessment not needed       Will Not start Pressors- Levophed for MAP of 65  Source control achieved by: IV zosyn   "

## 2024-05-21 PROBLEM — E87.5 HYPERKALEMIA: Status: ACTIVE | Noted: 2024-05-21

## 2024-05-21 LAB
ANION GAP SERPL CALC-SCNC: 15 MMOL/L (ref 8–16)
BUN SERPL-MCNC: 58 MG/DL (ref 8–23)
CALCIUM SERPL-MCNC: 8.9 MG/DL (ref 8.7–10.5)
CHLORIDE SERPL-SCNC: 102 MMOL/L (ref 95–110)
CO2 SERPL-SCNC: 19 MMOL/L (ref 23–29)
CREAT SERPL-MCNC: 1.1 MG/DL (ref 0.5–1.4)
ERYTHROCYTE [DISTWIDTH] IN BLOOD BY AUTOMATED COUNT: 13.6 % (ref 11.5–14.5)
EST. GFR  (NO RACE VARIABLE): 48 ML/MIN/1.73 M^2
GLUCOSE SERPL-MCNC: 107 MG/DL (ref 70–110)
HCT VFR BLD AUTO: 41.7 % (ref 37–48.5)
HGB BLD-MCNC: 14.3 G/DL (ref 12–16)
MAGNESIUM SERPL-MCNC: 2.2 MG/DL (ref 1.6–2.6)
MCH RBC QN AUTO: 29.6 PG (ref 27–31)
MCHC RBC AUTO-ENTMCNC: 34.3 G/DL (ref 32–36)
MCV RBC AUTO: 86 FL (ref 82–98)
PHOSPHATE SERPL-MCNC: 4.3 MG/DL (ref 2.7–4.5)
PLATELET # BLD AUTO: 214 K/UL (ref 150–450)
PMV BLD AUTO: 11.7 FL (ref 9.2–12.9)
POTASSIUM SERPL-SCNC: 4.7 MMOL/L (ref 3.5–5.1)
POTASSIUM SERPL-SCNC: 5.4 MMOL/L (ref 3.5–5.1)
RBC # BLD AUTO: 4.83 M/UL (ref 4–5.4)
SODIUM SERPL-SCNC: 136 MMOL/L (ref 136–145)
WBC # BLD AUTO: 24.89 K/UL (ref 3.9–12.7)

## 2024-05-21 PROCEDURE — 83735 ASSAY OF MAGNESIUM: CPT | Mod: HCNC | Performed by: REGISTERED NURSE

## 2024-05-21 PROCEDURE — 63600175 PHARM REV CODE 636 W HCPCS: Mod: HCNC

## 2024-05-21 PROCEDURE — 84132 ASSAY OF SERUM POTASSIUM: CPT | Mod: HCNC | Performed by: INTERNAL MEDICINE

## 2024-05-21 PROCEDURE — 84100 ASSAY OF PHOSPHORUS: CPT | Mod: HCNC | Performed by: REGISTERED NURSE

## 2024-05-21 PROCEDURE — 11000001 HC ACUTE MED/SURG PRIVATE ROOM: Mod: HCNC

## 2024-05-21 PROCEDURE — 25000003 PHARM REV CODE 250: Mod: HCNC | Performed by: INTERNAL MEDICINE

## 2024-05-21 PROCEDURE — 85027 COMPLETE CBC AUTOMATED: CPT | Mod: HCNC | Performed by: STUDENT IN AN ORGANIZED HEALTH CARE EDUCATION/TRAINING PROGRAM

## 2024-05-21 PROCEDURE — 80048 BASIC METABOLIC PNL TOTAL CA: CPT | Mod: HCNC | Performed by: REGISTERED NURSE

## 2024-05-21 PROCEDURE — 36415 COLL VENOUS BLD VENIPUNCTURE: CPT | Mod: HCNC | Performed by: STUDENT IN AN ORGANIZED HEALTH CARE EDUCATION/TRAINING PROGRAM

## 2024-05-21 PROCEDURE — 25000003 PHARM REV CODE 250: Mod: HCNC | Performed by: STUDENT IN AN ORGANIZED HEALTH CARE EDUCATION/TRAINING PROGRAM

## 2024-05-21 PROCEDURE — 36415 COLL VENOUS BLD VENIPUNCTURE: CPT | Mod: HCNC,XB | Performed by: INTERNAL MEDICINE

## 2024-05-21 PROCEDURE — 63600175 PHARM REV CODE 636 W HCPCS: Mod: HCNC | Performed by: STUDENT IN AN ORGANIZED HEALTH CARE EDUCATION/TRAINING PROGRAM

## 2024-05-21 PROCEDURE — 36415 COLL VENOUS BLD VENIPUNCTURE: CPT | Mod: HCNC | Performed by: REGISTERED NURSE

## 2024-05-21 PROCEDURE — 25000003 PHARM REV CODE 250: Mod: HCNC

## 2024-05-21 RX ORDER — OXYCODONE HYDROCHLORIDE 5 MG/1
5 TABLET ORAL EVERY 6 HOURS PRN
Status: DISCONTINUED | OUTPATIENT
Start: 2024-05-21 | End: 2024-05-25 | Stop reason: HOSPADM

## 2024-05-21 RX ORDER — ACETAMINOPHEN 500 MG
1000 TABLET ORAL EVERY 8 HOURS
Status: DISCONTINUED | OUTPATIENT
Start: 2024-05-21 | End: 2024-05-25 | Stop reason: HOSPADM

## 2024-05-21 RX ORDER — SODIUM CHLORIDE 9 MG/ML
INJECTION, SOLUTION INTRAVENOUS CONTINUOUS
Status: ACTIVE | OUTPATIENT
Start: 2024-05-21 | End: 2024-05-22

## 2024-05-21 RX ORDER — OXYCODONE HYDROCHLORIDE 5 MG/1
10 TABLET ORAL EVERY 6 HOURS PRN
Status: DISCONTINUED | OUTPATIENT
Start: 2024-05-21 | End: 2024-05-25 | Stop reason: HOSPADM

## 2024-05-21 RX ORDER — MUPIROCIN 20 MG/G
OINTMENT TOPICAL 2 TIMES DAILY
Status: DISCONTINUED | OUTPATIENT
Start: 2024-05-21 | End: 2024-05-25 | Stop reason: HOSPADM

## 2024-05-21 RX ORDER — HYDROMORPHONE HYDROCHLORIDE 1 MG/ML
0.5 INJECTION, SOLUTION INTRAMUSCULAR; INTRAVENOUS; SUBCUTANEOUS EVERY 4 HOURS PRN
Status: DISCONTINUED | OUTPATIENT
Start: 2024-05-21 | End: 2024-05-25 | Stop reason: HOSPADM

## 2024-05-21 RX ADMIN — HYDROMORPHONE HYDROCHLORIDE 0.2 MG: 1 INJECTION, SOLUTION INTRAMUSCULAR; INTRAVENOUS; SUBCUTANEOUS at 08:05

## 2024-05-21 RX ADMIN — SODIUM CHLORIDE: 9 INJECTION, SOLUTION INTRAVENOUS at 05:05

## 2024-05-21 RX ADMIN — PIPERACILLIN SODIUM AND TAZOBACTAM SODIUM 4.5 G: 4; .5 INJECTION, POWDER, LYOPHILIZED, FOR SOLUTION INTRAVENOUS at 12:05

## 2024-05-21 RX ADMIN — HYDROMORPHONE HYDROCHLORIDE 0.2 MG: 1 INJECTION, SOLUTION INTRAMUSCULAR; INTRAVENOUS; SUBCUTANEOUS at 04:05

## 2024-05-21 RX ADMIN — MUPIROCIN: 20 OINTMENT TOPICAL at 09:05

## 2024-05-21 RX ADMIN — ACETAMINOPHEN 1000 MG: 500 TABLET ORAL at 02:05

## 2024-05-21 RX ADMIN — ONDANSETRON 4 MG: 2 INJECTION INTRAMUSCULAR; INTRAVENOUS at 04:05

## 2024-05-21 RX ADMIN — HYDROMORPHONE HYDROCHLORIDE 0.5 MG: 1 INJECTION, SOLUTION INTRAMUSCULAR; INTRAVENOUS; SUBCUTANEOUS at 07:05

## 2024-05-21 RX ADMIN — ACETAMINOPHEN 1000 MG: 500 TABLET ORAL at 09:05

## 2024-05-21 RX ADMIN — HYDROMORPHONE HYDROCHLORIDE 0.5 MG: 1 INJECTION, SOLUTION INTRAMUSCULAR; INTRAVENOUS; SUBCUTANEOUS at 12:05

## 2024-05-21 RX ADMIN — SODIUM CHLORIDE: 9 INJECTION, SOLUTION INTRAVENOUS at 10:05

## 2024-05-21 NOTE — PLAN OF CARE
Discussed plan of care for shift with patient/ family present at beside.  Will continue to monitor post procedure

## 2024-05-21 NOTE — PROGRESS NOTES
[facility] Medicine  Progress Note    Patient Name: Alexandra Gleason  MRN: [id number]  Patient Class: IP- Inpatient   Admission Date: 5/20/2024  Length of Stay: 1 days  Attending Physician: Marcelino Reed MD  Primary Care Provider: Carla Butler MD        Subjective:     Principal Problem:SBO (small bowel obstruction)        HPI:  89 years old woman with past medical history of hypertension, hyperlipidemia, coronary artery disease, hysterectomy, cholecystectomy, and appendicectomy.  The patient is very functional at baseline.  She presented to outside facility for abdominal pain, constipation and she was found to have a small-bowel obstruction evident by CT.  The patient failed conservative therapy and she was referred to our hospital for surgical intervention.      Overview/Hospital Course:  No notes on file    Interval History:  Patient was examined in her bed.  She was calm and not in distress.  She denies chest pain, palpitation, shortness and breath.  She states she feels better today but has mild discomfort at the site of surgery.    Review of Systems   Constitutional:  Negative for activity change, chills and fever.   HENT:  Negative for congestion, rhinorrhea and sore throat.    Eyes:  Negative for discharge and redness.   Respiratory:  Negative for cough, chest tightness, shortness of breath and wheezing.    Cardiovascular:  Negative for chest pain, palpitations and leg swelling.   Gastrointestinal:  Positive for abdominal pain. Negative for constipation, diarrhea, nausea and vomiting.   Genitourinary:  Negative for dysuria, flank pain and hematuria.   Musculoskeletal:  Negative for back pain, myalgias and neck pain.   Skin:  Negative for pallor, rash and wound.   Neurological:  Negative for dizziness, tremors, syncope, weakness, numbness and headaches.   Psychiatric/Behavioral:  Negative for agitation, confusion and hallucinations.    All other systems reviewed and are  negative.    Objective:     Vital Signs (Most Recent):  Temp: 98 °F (36.7 °C) (05/21/24 1530)  Pulse: 70 (05/21/24 1545)  Resp: 18 (05/21/24 1545)  BP: (!) 120/53 (05/21/24 1545)  SpO2: 98 % (05/21/24 1545) Vital Signs (24h Range):  Temp:  [96.9 °F (36.1 °C)-99 °F (37.2 °C)] 98 °F (36.7 °C)  Pulse:  [67-99] 70  Resp:  [14-30] 18  SpO2:  [94 %-100 %] 98 %  BP: ()/(49-67) 120/53     Weight: 49 kg (108 lb 0.4 oz)  Body mass index is 22.58 kg/m².    Intake/Output Summary (Last 24 hours) at 5/21/2024 1652  Last data filed at 5/21/2024 1601  Gross per 24 hour   Intake 2104.54 ml   Output 1115 ml   Net 989.54 ml         Physical Exam  Vitals and nursing note reviewed.   Constitutional:       General: She is not in acute distress.  HENT:      Head: Normocephalic and atraumatic.      Mouth/Throat:      Mouth: Mucous membranes are moist.   Eyes:      General:         Right eye: No discharge.         Left eye: No discharge.      Conjunctiva/sclera: Conjunctivae normal.   Cardiovascular:      Rate and Rhythm: Normal rate and regular rhythm.      Pulses: Normal pulses.   Pulmonary:      Effort: Pulmonary effort is normal.      Breath sounds: Normal breath sounds.   Abdominal:      General: Bowel sounds are normal.      Palpations: Abdomen is soft.      Tenderness: There is abdominal tenderness.      Comments: Tenderness at surgery site   Musculoskeletal:         General: No swelling. Normal range of motion.      Cervical back: Normal range of motion and neck supple.   Skin:     General: Skin is warm and dry.   Neurological:      Mental Status: She is alert and oriented to person, place, and time. Mental status is at baseline.   Psychiatric:         Mood and Affect: Mood normal.             Significant Labs: All pertinent labs within the past 24 hours have been reviewed.    Significant Imaging: I have reviewed all pertinent imaging results/findings within the past 24 hours.    Assessment/Plan:      * SBO (small bowel  obstruction)    Continue intermittent suction with NG tube.  IV hydration.  Continue Zosyn.  Surgery consult. POD#1 exploratory laparotomy  Hypokalemia overcorrected  -tolerating clear liquid diet    Hyperkalemia  This patient has hyperkalemia which is uncontrolled. We will monitor for arrhythmias with EKG or continuous telemetry. We will treat the hyperkalemia with Potassium Binders and repeat labs . The likely etiology of the hyperkalemia is  patient was on potassium chloride IV .  The patients latest potassium has been reviewed and the results are listed below  Recent Labs   Lab 05/21/24  0651   K 5.4*     -stop potassium chloride IV  -repeat labs  -continue with normal saline          ALEXEY (acute kidney injury)  Likely prerenal  Avoid nephrotoxin  IV hydration as above    Coronary artery disease involving native coronary artery of native heart without angina pectoris  No acute issues  Continue to monitor    HTN (hypertension)  Hold home blood pressure medications      VTE Risk Mitigation (From admission, onward)           Ordered     Place sequential compression device  Until discontinued         05/20/24 1734     IP VTE HIGH RISK PATIENT  Once         05/20/24 1734     Reason for No Pharmacological VTE Prophylaxis  Once        Question:  Reasons:  Answer:  Physician Provided (leave comment)  Comment:  pending surgery    05/20/24 1734                    Discharge Planning   AMADO:      Code Status: Full Code   Is the patient medically ready for discharge?:     Reason for patient still in hospital (select all that apply): Patient trending condition, Treatment, Imaging, and Consult recommendations  Discharge Plan A: Home with family            Critical care time spent on the evaluation and treatment of severe organ dysfunction, review of pertinent labs and imaging studies, discussions with consulting providers and discussions with patient/family: 33 minutes.      Marcelino Reed MD  Department of Hospital Medicine    [facility] - Intensive Care

## 2024-05-21 NOTE — SUBJECTIVE & OBJECTIVE
Interval History:  Patient was examined in her bed.  She was calm and not in distress.  She denies chest pain, palpitation, shortness and breath.  She states she feels better today but has mild discomfort at the site of surgery.    Review of Systems   Constitutional:  Negative for activity change, chills and fever.   HENT:  Negative for congestion, rhinorrhea and sore throat.    Eyes:  Negative for discharge and redness.   Respiratory:  Negative for cough, chest tightness, shortness of breath and wheezing.    Cardiovascular:  Negative for chest pain, palpitations and leg swelling.   Gastrointestinal:  Positive for abdominal pain. Negative for constipation, diarrhea, nausea and vomiting.   Genitourinary:  Negative for dysuria, flank pain and hematuria.   Musculoskeletal:  Negative for back pain, myalgias and neck pain.   Skin:  Negative for pallor, rash and wound.   Neurological:  Negative for dizziness, tremors, syncope, weakness, numbness and headaches.   Psychiatric/Behavioral:  Negative for agitation, confusion and hallucinations.    All other systems reviewed and are negative.    Objective:     Vital Signs (Most Recent):  Temp: 98 °F (36.7 °C) (05/21/24 1530)  Pulse: 70 (05/21/24 1545)  Resp: 18 (05/21/24 1545)  BP: (!) 120/53 (05/21/24 1545)  SpO2: 98 % (05/21/24 1545) Vital Signs (24h Range):  Temp:  [96.9 °F (36.1 °C)-99 °F (37.2 °C)] 98 °F (36.7 °C)  Pulse:  [67-99] 70  Resp:  [14-30] 18  SpO2:  [94 %-100 %] 98 %  BP: ()/(49-67) 120/53     Weight: 49 kg (108 lb 0.4 oz)  Body mass index is 22.58 kg/m².    Intake/Output Summary (Last 24 hours) at 5/21/2024 1652  Last data filed at 5/21/2024 1601  Gross per 24 hour   Intake 2104.54 ml   Output 1115 ml   Net 989.54 ml         Physical Exam  Vitals and nursing note reviewed.   Constitutional:       General: She is not in acute distress.  HENT:      Head: Normocephalic and atraumatic.      Mouth/Throat:      Mouth: Mucous membranes are moist.   Eyes:       General:         Right eye: No discharge.         Left eye: No discharge.      Conjunctiva/sclera: Conjunctivae normal.   Cardiovascular:      Rate and Rhythm: Normal rate and regular rhythm.      Pulses: Normal pulses.   Pulmonary:      Effort: Pulmonary effort is normal.      Breath sounds: Normal breath sounds.   Abdominal:      General: Bowel sounds are normal.      Palpations: Abdomen is soft.      Tenderness: There is abdominal tenderness.      Comments: Tenderness at surgery site   Musculoskeletal:         General: No swelling. Normal range of motion.      Cervical back: Normal range of motion and neck supple.   Skin:     General: Skin is warm and dry.   Neurological:      Mental Status: She is alert and oriented to person, place, and time. Mental status is at baseline.   Psychiatric:         Mood and Affect: Mood normal.             Significant Labs: All pertinent labs within the past 24 hours have been reviewed.    Significant Imaging: I have reviewed all pertinent imaging results/findings within the past 24 hours.

## 2024-05-21 NOTE — PROGRESS NOTES
[facility] Intensive Care  General Surgery  Progress Note    Subjective:     History of Present Illness:  No notes on file    Post-Op Info:  Procedure(s) (LRB):  LAPAROTOMY, EXPLORATORY (N/A)   1 Day Post-Op     Interval History: NAEON. AVSS. Abdominal exam benign. NGT with minimal output since surgery. Lab work with down trending leukocytosis.    Medications:  Continuous Infusions:   0.45% NaCl with KCl 20 mEq infusion  50 mL/hr Intravenous Continuous 50 mL/hr at 05/21/24 0624 50 mL/hr at 05/21/24 0624     Scheduled Meds:   acetaminophen  1,000 mg Oral Q8H    mupirocin   Nasal BID    piperacillin-tazobactam (Zosyn) IV (PEDS and ADULTS) (extended infusion is not appropriate)  4.5 g Intravenous Q12H     PRN Meds:  Current Facility-Administered Medications:     dextrose 10%, 12.5 g, Intravenous, PRN    dextrose 10%, 25 g, Intravenous, PRN    glucagon (human recombinant), 1 mg, Intramuscular, PRN    glucose, 16 g, Oral, PRN    glucose, 24 g, Oral, PRN    HYDROmorphone, 0.2 mg, Intravenous, Q6H PRN    HYDROmorphone, 0.5 mg, Intravenous, Q4H PRN    naloxone, 0.02 mg, Intravenous, PRN    ondansetron, 4 mg, Intravenous, Q6H PRN    oxyCODONE, 10 mg, Oral, Q6H PRN    oxyCODONE, 5 mg, Oral, Q6H PRN    sodium chloride 0.9%, 10 mL, Intravenous, PRN    sodium chloride 0.9%, 10 mL, Intravenous, Q12H PRN     Review of patient's allergies indicates:   Allergen Reactions    Iodine and iodide containing products      Objective:     Vital Signs (Most Recent):  Temp: 98.2 °F (36.8 °C) (05/21/24 0715)  Pulse: 80 (05/21/24 0715)  Resp: 19 (05/21/24 0715)  BP: (!) 124/55 (05/21/24 0715)  SpO2: 98 % (05/21/24 0715) Vital Signs (24h Range):  Temp:  [96.9 °F (36.1 °C)-99 °F (37.2 °C)] 98.2 °F (36.8 °C)  Pulse:  [67-99] 80  Resp:  [15-30] 19  SpO2:  [96 %-100 %] 98 %  BP: ()/(51-67) 124/55     Weight: 49 kg (108 lb 0.4 oz)  Body mass index is 22.58 kg/m².    Intake/Output - Last 3 Shifts         05/19 0700 05/20 0659 05/20 0700 05/21  0659 05/21 0700  05/22 0659    P.O.  50     I.V. (mL/kg)  524.8 (10.7)     IV Piggyback  746.9     Total Intake(mL/kg)  1321.7 (27)     Urine (mL/kg/hr)  515     Drains  400     Total Output  915     Net  +406.7                     Physical Exam  Vitals reviewed.   Constitutional:       Appearance: Normal appearance.   Cardiovascular:      Rate and Rhythm: Normal rate.      Pulses: Normal pulses.   Pulmonary:      Effort: Pulmonary effort is normal.   Abdominal:      General: There is no distension.      Palpations: Abdomen is soft. There is no mass.      Tenderness: There is no guarding.      Comments: Appropriately tender. Midline dressing CDI.   Skin:     General: Skin is warm and dry.   Neurological:      General: No focal deficit present.      Mental Status: She is alert and oriented to person, place, and time.          Significant Labs:  I have reviewed all pertinent lab results within the past 24 hours.  CBC:   Recent Labs   Lab 05/21/24  0407   WBC 24.89*   RBC 4.83   HGB 14.3   HCT 41.7      MCV 86   MCH 29.6   MCHC 34.3     CMP:   Recent Labs   Lab 05/20/24  0849 05/20/24  1807   * 107   CALCIUM 10.3 9.5   ALBUMIN 3.2*  --    PROT 6.7  --     138   K 4.4 3.3*   CO2 25 24   CL 96 97   BUN 74* 77*   CREATININE 1.7* 1.5*   ALKPHOS 58  --    ALT 22  --    AST 24  --    BILITOT 0.5  --        Significant Diagnostics:  I have reviewed all pertinent imaging results/findings within the past 24 hours.  Assessment/Plan:     * SBO (small bowel obstruction)  89 yoF with SBO now s/p ex lap and lysis of adhesions on 5/20. Progressing appropriately.    - Remove NGT  - Clear liquid diet  - PO/IV multimodal pain regimen  - Activity as tolerated  - PT  - Will remove dressing tomorrow        Luca Lagunas MD  General Surgery  [place] - Intensive Care

## 2024-05-21 NOTE — ASSESSMENT & PLAN NOTE
Continue intermittent suction with NG tube.  IV hydration.  Continue Zosyn.  Surgery consult. POD#1 exploratory laparotomy  Hypokalemia overcorrected  -tolerating clear liquid diet

## 2024-05-21 NOTE — PLAN OF CARE
The sw met with the pt and Amara Borrero(grandchild)[phone]/Ana Callejas(grandchild)[phone]/Rody Callejas(dtr)[phone] who were all in the room during the assessment. The pt is a transfer from [facility]. The pt lives in [place] with Rody and her  and Ana lives next door. The pt has a very large,supportive family. The pt stopped driving a year ago,so her family transports her to dr montes's and errands. The pt's independent with her ADL's and doesn't use dme. The pt leads a very active lifestyle(rides her bicycle daily,walks everyday and takes 20 stairs daily to enter her house(pt refuses to use her elevator). The sw completed the white board in the pt's room with her name and contact info. The sw gave the pt's family a d/c brochure with her contact info on it. The sw encouraged them to call if they have any further questions or concerns. The sw will continue to follow the pt throughout her transitions of care and will assist with any d/c needs.     [place] - Intensive Care  Initial Discharge Assessment       Primary Care Provider: Carla Butler MD    Admission Diagnosis: SBO (small bowel obstruction) [K56.609]    Admission Date: 5/20/2024  Expected Discharge Date:     Transition of Care Barriers: (P) None    Payor: [employer] MANAGED MEDICARE / Plan: [employer] MEDICARE HMO / Product Type: Capitation /     Extended Emergency Contact Information  Primary Emergency Contact: Rody Callejas  Address: [address] [place] [address]  Mobile Phone: [phone]  Relation: Daughter  Secondary Emergency Contact: Ana Callejas  Mobile Phone: [phone]  Relation: Grandchild    Discharge Plan A: (P) Home with family  Discharge Plan B: (P) [facility] - [place], [place] - [address]  [address] [address]  Phone: [phone] Fax: [phone]      Initial Assessment (most recent)       Adult  Discharge Assessment - 05/21/24 1123          Discharge Assessment    Assessment Type Discharge Planning Assessment (P)      Confirmed/corrected address, phone number and insurance Yes (P)      Confirmed Demographics Correct on Facesheet (P)      Source of Information patient;family (P)      Contact Name/Number Amara Borrero(grandchild)[phone]/Ana Callejas(grandchild)[phone]/Rody Callejas(dtr)[phone] (P)      When was your last doctors appointment? 05/08/24 (P)      Communicated AMADO with patient/caregiver Yes (P)      Reason For Admission Small bowel obstruction (P)      People in Home child(brandt), adult;other relative(s) (P)      Facility Arrived From: [facility] (P)      Do you expect to return to your current living situation? Yes (P)      Do you have help at home or someone to help you manage your care at home? Yes (P)      Who are your caregiver(s) and their phone number(s)? Amara Borrero(grandchild)[phone]/Ana Callejas(grandchild)[phone]/Rody Callejas(dtr)[phone] (P)      Prior to hospitilization cognitive status: Alert/Oriented (P)      Current cognitive status: Alert/Oriented (P)      Walking or Climbing Stairs Difficulty no (P)      Dressing/Bathing Difficulty no (P)      Home Accessibility wheelchair accessible (P)      Number of Stairs, Main Entrance other (see comments) (P)    20(pt has an elevator but refuses to use it)    Stair Railings, Main Entrance railings safe and in good condition (P)      Home Layout Able to live on 1st floor (P)      Equipment Currently Used at Home none (P)      Readmission within 30 days? No (P)      Patient currently being followed by outpatient case management? No (P)      Do you currently have service(s) that help you manage your care at home? No (P)      Do you take prescription medications? Yes (P)      Do you have prescription coverage? Yes (P)      Coverage [employer] (P)      Do you have any problems affording  any of your prescribed medications? No (P)      Is the patient taking medications as prescribed? yes (P)      Who is going to help you get home at discharge? Amara Borrero(grandchild)[phone]/Ana Callejas(grandchild)[phone]/Rody Callejas(dtr)[phone] (P)      How do you get to doctors appointments? family or friend will provide (P)      Are you on dialysis? No (P)      Discharge Plan A Home with family (P)      Discharge Plan B Home Health (P)      DME Needed Upon Discharge  other (see comments) (P)    TBD    Discharge Plan discussed with: Patient;Adult children (P)      Transition of Care Barriers None (P)         Physical Activity    On average, how many days per week do you engage in moderate to strenuous exercise (like a brisk walk)? 7 days (P)      On average, how many minutes do you engage in exercise at this level? 60 min (P)         Financial Resource Strain    How hard is it for you to pay for the very basics like food, housing, medical care, and heating? Not hard at all (P)         Housing Stability    In the last 12 months, was there a time when you were not able to pay the mortgage or rent on time? No (P)      At any time in the past 12 months, were you homeless or living in a shelter (including now)? No (P)         Transportation Needs    Has the lack of transportation kept you from medical appointments, meetings, work or from getting things needed for daily living? No (P)         Food Insecurity    Within the past 12 months, you worried that your food would run out before you got the money to buy more. Never true (P)      Within the past 12 months, the food you bought just didn't last and you didn't have money to get more. Never true (P)         Stress    Do you feel stress - tense, restless, nervous, or anxious, or unable to sleep at night because your mind is troubled all the time - these days? Not at all (P)         Social Isolation    How often do you feel lonely or isolated from  those around you?  Never (P)         Alcohol Use    Q1: How often do you have a drink containing alcohol? Never (P)      Q2: How many drinks containing alcohol do you have on a typical day when you are drinking? Patient does not drink (P)      Q3: How often do you have six or more drinks on one occasion? Never (P)         Utilities    In the past 12 months has the electric, gas, oil, or water company threatened to shut off services in your home? No (P)         Health Literacy    How often do you need to have someone help you when you read instructions, pamphlets, or other written material from your doctor or pharmacy? Rarely (P)         OTHER    Name(s) of People in Home Rody Callejas(dtr)[phone] (P)

## 2024-05-21 NOTE — ASSESSMENT & PLAN NOTE
89 yoF with SBO now s/p ex lap and lysis of adhesions on 5/20. Progressing appropriately.    - Remove NGT  - Clear liquid diet  - PO/IV multimodal pain regimen  - Activity as tolerated  - PT  - Will remove dressing tomorrow

## 2024-05-21 NOTE — H&P
[facility] Medicine  History & Physical    Patient Name: Alexandra Gleason  MRN: [id number]  Patient Class: IP- Inpatient  Admission Date: 5/20/2024  Attending Physician: Nando Valdez MD  Primary Care Provider: Carla Butler MD         Patient information was obtained from patient and ER records.     Subjective:     Principal Problem:SBO (small bowel obstruction)    Chief Complaint: No chief complaint on file.       HPI: 89 years old woman with past medical history of hypertension, hyperlipidemia, coronary artery disease, hysterectomy, cholecystectomy, and appendicectomy.  The patient is very functional at baseline.  She presented to outside facility for abdominal pain, constipation and she was found to have a small-bowel obstruction evident by CT.  The patient failed conservative therapy and she was referred to our hospital for surgical intervention.      Past Medical History:   Diagnosis Date    High cholesterol     Hypertension        Past Surgical History:   Procedure Laterality Date    BACK SURGERY      CHOLECYSTECTOMY      HYSTERECTOMY      NECK SURGERY         Review of patient's allergies indicates:   Allergen Reactions    Iodine and iodide containing products        Current Facility-Administered Medications on File Prior to Encounter   Medication    [COMPLETED] HYDROmorphone injection 0.5 mg    [COMPLETED] vancomycin (VANCOCIN) 1,000 mg in dextrose 5 % (D5W) 250 mL IVPB (Vial-Mate)    [DISCONTINUED] 0.9%  NaCl infusion    [DISCONTINUED] Amino acid 4.25% - dextrose 5% (CLINIMIX-E) solution with additives (1L provides 42.5 gm AA, 50 gm CHO (170 kcal/L dextrose), Na 35, K 30, Mg 5, Ca 4.5, Acetate 70, Cl 39, Phos 15)    [DISCONTINUED] Amino acid 4.25% - dextrose 5% (CLINIMIX-E) solution with additives (1L provides 42.5 gm AA, 50 gm CHO (170 kcal/L dextrose), Na 35, K 30, Mg 5, Ca 4.5, Acetate 70, Cl 39, Phos 15)    [DISCONTINUED] ceFAZolin 2 g in dextrose 5 % in water (D5W)  50 mL IVPB (MB+)    [DISCONTINUED] diatrizoate meglumineand-diatrizoate sodium (GASTROVIEW) solution 120 mL    [DISCONTINUED] HYDROmorphone injection 0.5 mg    [DISCONTINUED] melatonin tablet 6 mg    [DISCONTINUED] mupirocin 2 % ointment    [DISCONTINUED] ondansetron injection 4 mg    [DISCONTINUED] piperacillin-tazobactam (ZOSYN) 4.5 g in dextrose 5 % in water (D5W) 100 mL IVPB (MB+)    [DISCONTINUED] sodium chloride 0.9% flush 10 mL    [DISCONTINUED] vancomycin - pharmacy to dose     Current Outpatient Medications on File Prior to Encounter   Medication Sig    atorvastatin (LIPITOR) 20 MG tablet Take 20 mg by mouth once daily.    cholecalciferol, vitamin D3, (VITAMIN D3) 25 mcg (1,000 unit) capsule Take 1 capsule (1,000 Units total) by mouth once daily.    lisinopriL 10 MG tablet lisinopriL 10 mg tablet, [RxNorm: 869827]    primidone (MYSOLINE) 50 MG Tab TAKE 2 TABLETS EVERY EVENING    [DISCONTINUED] cholestyramine-aspartame (CHOLESTYRAMINE LIGHT) 4 gram Powd Take 4 g by mouth once daily.    [DISCONTINUED] sertraline (ZOLOFT) 25 MG tablet TAKE 1 TABLET ONE TIME DAILY     Family History    None       Tobacco Use    Smoking status: Never    Smokeless tobacco: Not on file   Substance and Sexual Activity    Alcohol use: No    Drug use: No    Sexual activity: Not on file     Review of Systems   Gastrointestinal:  Positive for abdominal pain and constipation.   All other systems reviewed and are negative.    Objective:     Vital Signs (Most Recent):  Temp: 99 °F (37.2 °C) (05/20/24 1735)  Pulse: 90 (05/20/24 1836)  Resp: 20 (05/20/24 1836)  BP: (!) 100/56 (05/20/24 1836)  SpO2: 98 % (05/20/24 1836) Vital Signs (24h Range):  Temp:  [97.4 °F (36.3 °C)-99 °F (37.2 °C)] 99 °F (37.2 °C)  Pulse:  [] 90  Resp:  [15-24] 20  SpO2:  [93 %-98 %] 98 %  BP: ()/(50-59) 100/56     Weight: 49 kg (108 lb 0.4 oz)  Body mass index is 22.58 kg/m².     Physical Exam  Constitutional:       Appearance: Normal appearance.   HENT:       Right Ear: External ear normal.      Left Ear: External ear normal.      Mouth/Throat:      Mouth: Mucous membranes are dry.      Pharynx: Oropharynx is clear.   Cardiovascular:      Rate and Rhythm: Normal rate and regular rhythm.   Pulmonary:      Effort: Pulmonary effort is normal.   Abdominal:      General: Abdomen is flat.      Palpations: Abdomen is soft.      Tenderness: There is abdominal tenderness.   Neurological:      General: No focal deficit present.      Mental Status: She is alert and oriented to person, place, and time.                Significant Labs: All pertinent labs within the past 24 hours have been reviewed.    Significant Imaging: I have reviewed all pertinent imaging results/findings within the past 24 hours.  Assessment/Plan:     * SBO (small bowel obstruction)    Continue intermittent suction with NG tube.  IV hydration.  Correct hypokalemia.  Continue Zosyn.  Surgery consult.  Plan for surgical intervention tonight    ALEXEY (acute kidney injury)  Likely prerenal  Avoid nephrotoxin  IV hydration as above    Coronary artery disease involving native coronary artery of native heart without angina pectoris  No acute issues  Continue to monitor    HTN (hypertension)  Hold home blood pressure medications      VTE Risk Mitigation (From admission, onward)           Ordered     Place sequential compression device  Until discontinued         05/20/24 1734     IP VTE HIGH RISK PATIENT  Once         05/20/24 1734     Reason for No Pharmacological VTE Prophylaxis  Once        Question:  Reasons:  Answer:  Physician Provided (leave comment)  Comment:  pending surgery    05/20/24 1734                  Critical care time spent on the evaluation and treatment of severe organ dysfunction, review of pertinent labs and imaging studies, discussions with consulting providers and discussions with patient/family: 20 minutes.                  Nando Valdez MD  Department of [facility] Medicine  [facility]  Intensive Care

## 2024-05-21 NOTE — NURSING
1955- Returned from surgery to floor via bed, pt still drowsy from sedation, but does answer questions appropriately, no acute distress. VS stable, 02 present 3L via simple mask afebrile. Currently has maintenance fluids infusing. Dressing to midline abdomen clean, dry, intact. Abdomen tender to touch. Skin warm/dry.  Morton catheter patent draining clear yellow urine. NG hooked up to low intermittent suction.    2019- spoke with MD José Miguel to notify that potassium ordered , pt does not have central line to receive, consulted pharmacy, per pharm order should be changed to 2x doses of 10 meq to receive, new orders placed by MD for 10meq    2116-called to bedside by family pt verbalized that she is experiencing abdominal pain post procedure, new orders placed for prn per MD for pain med.    2237- pt receiving potassium replacement called to bedside by family, pt c/o burning to vein, assessed site, no redness, swelling to extremity, iv intact, reached out to Md to notify pt is not tolerating potassium, okay to run with fluids    0708- Team at bedside making rounds, notified that pt is having pain, ordered prn isn't working per pt. New orders placed for prn pain med, team discussed procedure from last night with pt/family, verbalized a understanding, discussed removing ng tube.

## 2024-05-21 NOTE — PLAN OF CARE
Problem: Adult Inpatient Plan of Care  Goal: Plan of Care Review  Outcome: Progressing  Goal: Patient-Specific Goal (Individualized)  Outcome: Progressing  Goal: Absence of Hospital-Acquired Illness or Injury  Outcome: Progressing  Goal: Optimal Comfort and Wellbeing  Outcome: Progressing  Goal: Readiness for Transition of Care  Outcome: Progressing     Problem: Sepsis/Septic Shock  Goal: Optimal Coping  Outcome: Progressing  Goal: Absence of Bleeding  Outcome: Progressing  Goal: Blood Glucose Level Within Targeted Range  Outcome: Progressing  Goal: Absence of Infection Signs and Symptoms  Outcome: Progressing  Goal: Optimal Nutrition Intake  Outcome: Progressing     Problem: Fall Injury Risk  Goal: Absence of Fall and Fall-Related Injury  Outcome: Progressing     Problem: Skin Injury Risk Increased  Goal: Skin Health and Integrity  Outcome: Progressing     Problem: Acute Kidney Injury/Impairment  Goal: Fluid and Electrolyte Balance  Outcome: Progressing  Goal: Improved Oral Intake  Outcome: Progressing  Goal: Effective Renal Function  Outcome: Progressing     Problem: Wound  Goal: Optimal Coping  Outcome: Progressing  Goal: Optimal Functional Ability  Outcome: Progressing  Goal: Absence of Infection Signs and Symptoms  Outcome: Progressing  Goal: Improved Oral Intake  Outcome: Progressing  Goal: Optimal Pain Control and Function  Outcome: Progressing  Goal: Skin Health and Integrity  Outcome: Progressing  Goal: Optimal Wound Healing  Outcome: Progressing     Problem: Infection  Goal: Absence of Infection Signs and Symptoms  Outcome: Progressing

## 2024-05-21 NOTE — ANESTHESIA POSTPROCEDURE EVALUATION
Anesthesia Post Evaluation    Patient: Alexandra Gleason    Procedure(s) Performed: Procedure(s) (LRB):  LAPAROTOMY, EXPLORATORY (N/A)    Final Anesthesia Type: general      Patient location during evaluation: ICU  Patient participation: Yes- Able to Participate  Level of consciousness: awake and alert  Post-procedure vital signs: reviewed and stable  Pain management: adequate  Airway patency: patent    PONV status at discharge: No PONV  Anesthetic complications: no      Cardiovascular status: blood pressure returned to baseline and hemodynamically stable  Respiratory status: unassisted  Hydration status: euvolemic  Follow-up not needed.              Vitals Value Taken Time   /64 05/20/24 2002   Temp 36.7 °C (98.06 °F) 05/20/24 1957   Pulse 85 05/20/24 2004   Resp 25 05/20/24 2004   SpO2 100 % 05/20/24 2004   Vitals shown include unfiled device data.      No case tracking events are documented in the log.      Pain/Jes Score: Pain Rating Prior to Med Admin: 10 (5/20/2024  4:15 AM)  Pain Rating Post Med Admin: 2 (5/20/2024  4:45 AM)

## 2024-05-21 NOTE — TRANSFER OF CARE
"Anesthesia Transfer of Care Note    Patient: Alexandra Gleason    Procedure(s) Performed: Procedure(s) (LRB):  LAPAROTOMY, EXPLORATORY (N/A)    Patient location: ICU    Anesthesia Type: general    Transport from OR: Transported from OR on 6-10 L/min O2 by face mask with adequate spontaneous ventilation    Post pain: adequate analgesia    Post assessment: no apparent anesthetic complications    Post vital signs: stable    Level of consciousness: awake    Nausea/Vomiting: no nausea/vomiting    Complications: none    Transfer of care protocol was followed      Last vitals: Visit Vitals  /65 (BP Location: Right arm, Patient Position: Lying)   Pulse 99   Temp 36.1 °C (96.9 °F) (Oral)   Resp (!) 21   Ht 4' 10" (1.473 m)   Wt 49 kg (108 lb 0.4 oz)   SpO2 100%   Breastfeeding No   BMI 22.58 kg/m²     "

## 2024-05-21 NOTE — ASSESSMENT & PLAN NOTE
This patient has hyperkalemia which is uncontrolled. We will monitor for arrhythmias with EKG or continuous telemetry. We will treat the hyperkalemia with Potassium Binders and repeat labs . The likely etiology of the hyperkalemia is  patient was on potassium chloride IV .  The patients latest potassium has been reviewed and the results are listed below  Recent Labs   Lab 05/21/24  0651   K 5.4*     -stop potassium chloride IV  -repeat labs  -continue with normal saline

## 2024-05-21 NOTE — SUBJECTIVE & OBJECTIVE
Interval History: NAEON. AVSS. Abdominal exam benign. NGT with minimal output since surgery. Lab work with down trending leukocytosis.    Medications:  Continuous Infusions:   0.45% NaCl with KCl 20 mEq infusion  50 mL/hr Intravenous Continuous 50 mL/hr at 05/21/24 0624 50 mL/hr at 05/21/24 0624     Scheduled Meds:   acetaminophen  1,000 mg Oral Q8H    mupirocin   Nasal BID    piperacillin-tazobactam (Zosyn) IV (PEDS and ADULTS) (extended infusion is not appropriate)  4.5 g Intravenous Q12H     PRN Meds:  Current Facility-Administered Medications:     dextrose 10%, 12.5 g, Intravenous, PRN    dextrose 10%, 25 g, Intravenous, PRN    glucagon (human recombinant), 1 mg, Intramuscular, PRN    glucose, 16 g, Oral, PRN    glucose, 24 g, Oral, PRN    HYDROmorphone, 0.2 mg, Intravenous, Q6H PRN    HYDROmorphone, 0.5 mg, Intravenous, Q4H PRN    naloxone, 0.02 mg, Intravenous, PRN    ondansetron, 4 mg, Intravenous, Q6H PRN    oxyCODONE, 10 mg, Oral, Q6H PRN    oxyCODONE, 5 mg, Oral, Q6H PRN    sodium chloride 0.9%, 10 mL, Intravenous, PRN    sodium chloride 0.9%, 10 mL, Intravenous, Q12H PRN     Review of patient's allergies indicates:   Allergen Reactions    Iodine and iodide containing products      Objective:     Vital Signs (Most Recent):  Temp: 98.2 °F (36.8 °C) (05/21/24 0715)  Pulse: 80 (05/21/24 0715)  Resp: 19 (05/21/24 0715)  BP: (!) 124/55 (05/21/24 0715)  SpO2: 98 % (05/21/24 0715) Vital Signs (24h Range):  Temp:  [96.9 °F (36.1 °C)-99 °F (37.2 °C)] 98.2 °F (36.8 °C)  Pulse:  [67-99] 80  Resp:  [15-30] 19  SpO2:  [96 %-100 %] 98 %  BP: ()/(51-67) 124/55     Weight: 49 kg (108 lb 0.4 oz)  Body mass index is 22.58 kg/m².    Intake/Output - Last 3 Shifts         05/19 0700 05/20 0659 05/20 0700 05/21 0659 05/21 0700 05/22 0659    P.O.  50     I.V. (mL/kg)  524.8 (10.7)     IV Piggyback  746.9     Total Intake(mL/kg)  1321.7 (27)     Urine (mL/kg/hr)  515     Drains  400     Total Output  915     Net  +406.7                      Physical Exam  Vitals reviewed.   Constitutional:       Appearance: Normal appearance.   Cardiovascular:      Rate and Rhythm: Normal rate.      Pulses: Normal pulses.   Pulmonary:      Effort: Pulmonary effort is normal.   Abdominal:      General: There is no distension.      Palpations: Abdomen is soft. There is no mass.      Tenderness: There is no guarding.      Comments: Appropriately tender. Midline dressing CDI.   Skin:     General: Skin is warm and dry.   Neurological:      General: No focal deficit present.      Mental Status: She is alert and oriented to person, place, and time.          Significant Labs:  I have reviewed all pertinent lab results within the past 24 hours.  CBC:   Recent Labs   Lab 05/21/24  0407   WBC 24.89*   RBC 4.83   HGB 14.3   HCT 41.7      MCV 86   MCH 29.6   MCHC 34.3     CMP:   Recent Labs   Lab 05/20/24  0849 05/20/24  1807   * 107   CALCIUM 10.3 9.5   ALBUMIN 3.2*  --    PROT 6.7  --     138   K 4.4 3.3*   CO2 25 24   CL 96 97   BUN 74* 77*   CREATININE 1.7* 1.5*   ALKPHOS 58  --    ALT 22  --    AST 24  --    BILITOT 0.5  --        Significant Diagnostics:  I have reviewed all pertinent imaging results/findings within the past 24 hours.

## 2024-05-21 NOTE — ANESTHESIA PROCEDURE NOTES
Intubation    Date/Time: 5/20/2024 6:59 PM    Performed by: Kamilla Dunne CRNA  Authorized by: Ar Vargas MD    Intubation:     Induction:  Intravenous    Intubated:  Postinduction    Mask Ventilation:  N/a    Attempts:  1    Attempted By:  CRNA    Method of Intubation:  Video laryngoscopy    Blade:  Bhagat 3    Laryngeal View Grade: Grade I - full view of cords      Difficult Airway Encountered?: No      Complications:  None    Airway Device:  Oral endotracheal tube    Airway Device Size:  7.0    Style/Cuff Inflation:  Cuffed    Inflation Amount (mL):  5    Tube secured:  21    Secured at:  The lips    Placement Verified By:  Capnometry    Complicating Factors:  None    Findings Post-Intubation:  BS equal bilateral

## 2024-05-21 NOTE — HPI
89 years old woman with past medical history of hypertension, hyperlipidemia, coronary artery disease, hysterectomy, cholecystectomy, and appendicectomy.  The patient is very functional at baseline.  She presented to outside facility for abdominal pain, constipation and she was found to have a small-bowel obstruction evident by CT.  The patient failed conservative therapy and she was referred to our hospital for surgical intervention.

## 2024-05-21 NOTE — OP NOTE
[facility] - Intensive Care  General Surgery  Operative Note    SUMMARY     Date of Procedure: 5/20/2024     Procedure: Procedure(s) (LRB):  LAPAROTOMY, EXPLORATORY (N/A)     Lysis of adhesions    Surgeons and Role:     * Meño Foster MD - Primary    Assisting Surgeon: None    Pre-Operative Diagnosis: SBO (small bowel obstruction) [K56.609]    Post-Operative Diagnosis: Post-Op Diagnosis Codes:     * SBO (small bowel obstruction) [K56.609]    Anesthesia: General    Operative Findings (including complications, if any):   Small adhesion in LLQ pelvis obstructing ileum  Bowel appeared viable. No resection  No perforation.    Description of Technical Procedures:   Patient brought into the OR and placed supine. She was given GETA. A vertical laparotomy incision was made and cautery was used to dissect down to the fascia. This was then incised and the abdomen entered without issue. There was no fluid noted. Some proximal small bowel dilation was noted. The stomach was inspected and found to be normal. The NG was withdrawn somewhat since the tip was in the duodenum. It was noted to be in the body of th stomach after adjustment.   The small bowel was run from proximal to distal. In the ileum there was noted to be a dense adhesion in the left pelvis creating a tight obstruction of the small bowel. This was taken down with cautery and the bowel freed. The remainder of the small bowel was decompressed and normal. The site of obstruction was approximately 100cm proximal to the ileocecal valve. The area was inspected. There was no sign of armen necrosis. Some small areas of ecchymosis were noted but it appeared to have good blood flow. Palpable pulses were noted in the area. The colon was normal in appearance. No omental or peritoneal lesions or small bowel lesions were noted.   The fascia was closed using running 1 PDS. Skin was closed using skin staples.       Significant Surgical Tasks Conducted by the Assistant(s), if  Applicable:     Estimated Blood Loss (EBL): 25ml           Implants: * No implants in log *    Specimens:   Specimen (24h ago, onward)      None                    Condition: Stable    Disposition: ICU - extubated and stable.    Attestation: I was present and scrubbed for the entire procedure.

## 2024-05-21 NOTE — ASSESSMENT & PLAN NOTE
Continue intermittent suction with NG tube.  IV hydration.  Correct hypokalemia.  Continue Zosyn.  Surgery consult.  Plan for surgical intervention tonight

## 2024-05-21 NOTE — SUBJECTIVE & OBJECTIVE
Past Medical History:   Diagnosis Date    High cholesterol     Hypertension        Past Surgical History:   Procedure Laterality Date    BACK SURGERY      CHOLECYSTECTOMY      HYSTERECTOMY      NECK SURGERY         Review of patient's allergies indicates:   Allergen Reactions    Iodine and iodide containing products        Current Facility-Administered Medications on File Prior to Encounter   Medication    [COMPLETED] HYDROmorphone injection 0.5 mg    [COMPLETED] vancomycin (VANCOCIN) 1,000 mg in dextrose 5 % (D5W) 250 mL IVPB (Vial-Mate)    [DISCONTINUED] 0.9%  NaCl infusion    [DISCONTINUED] Amino acid 4.25% - dextrose 5% (CLINIMIX-E) solution with additives (1L provides 42.5 gm AA, 50 gm CHO (170 kcal/L dextrose), Na 35, K 30, Mg 5, Ca 4.5, Acetate 70, Cl 39, Phos 15)    [DISCONTINUED] Amino acid 4.25% - dextrose 5% (CLINIMIX-E) solution with additives (1L provides 42.5 gm AA, 50 gm CHO (170 kcal/L dextrose), Na 35, K 30, Mg 5, Ca 4.5, Acetate 70, Cl 39, Phos 15)    [DISCONTINUED] ceFAZolin 2 g in dextrose 5 % in water (D5W) 50 mL IVPB (MB+)    [DISCONTINUED] diatrizoate meglumineand-diatrizoate sodium (GASTROVIEW) solution 120 mL    [DISCONTINUED] HYDROmorphone injection 0.5 mg    [DISCONTINUED] melatonin tablet 6 mg    [DISCONTINUED] mupirocin 2 % ointment    [DISCONTINUED] ondansetron injection 4 mg    [DISCONTINUED] piperacillin-tazobactam (ZOSYN) 4.5 g in dextrose 5 % in water (D5W) 100 mL IVPB (MB+)    [DISCONTINUED] sodium chloride 0.9% flush 10 mL    [DISCONTINUED] vancomycin - pharmacy to dose     Current Outpatient Medications on File Prior to Encounter   Medication Sig    atorvastatin (LIPITOR) 20 MG tablet Take 20 mg by mouth once daily.    cholecalciferol, vitamin D3, (VITAMIN D3) 25 mcg (1,000 unit) capsule Take 1 capsule (1,000 Units total) by mouth once daily.    lisinopriL 10 MG tablet lisinopriL 10 mg tablet, [RxNorm: 876553]    primidone (MYSOLINE) 50 MG Tab TAKE 2 TABLETS EVERY EVENING     [DISCONTINUED] cholestyramine-aspartame (CHOLESTYRAMINE LIGHT) 4 gram Powd Take 4 g by mouth once daily.    [DISCONTINUED] sertraline (ZOLOFT) 25 MG tablet TAKE 1 TABLET ONE TIME DAILY     Family History    None       Tobacco Use    Smoking status: Never    Smokeless tobacco: Not on file   Substance and Sexual Activity    Alcohol use: No    Drug use: No    Sexual activity: Not on file     Review of Systems   Gastrointestinal:  Positive for abdominal pain and constipation.   All other systems reviewed and are negative.    Objective:     Vital Signs (Most Recent):  Temp: 99 °F (37.2 °C) (05/20/24 1735)  Pulse: 90 (05/20/24 1836)  Resp: 20 (05/20/24 1836)  BP: (!) 100/56 (05/20/24 1836)  SpO2: 98 % (05/20/24 1836) Vital Signs (24h Range):  Temp:  [97.4 °F (36.3 °C)-99 °F (37.2 °C)] 99 °F (37.2 °C)  Pulse:  [] 90  Resp:  [15-24] 20  SpO2:  [93 %-98 %] 98 %  BP: ()/(50-59) 100/56     Weight: 49 kg (108 lb 0.4 oz)  Body mass index is 22.58 kg/m².     Physical Exam  Constitutional:       Appearance: Normal appearance.   HENT:      Right Ear: External ear normal.      Left Ear: External ear normal.      Mouth/Throat:      Mouth: Mucous membranes are dry.      Pharynx: Oropharynx is clear.   Cardiovascular:      Rate and Rhythm: Normal rate and regular rhythm.   Pulmonary:      Effort: Pulmonary effort is normal.   Abdominal:      General: Abdomen is flat.      Palpations: Abdomen is soft.      Tenderness: There is abdominal tenderness.   Neurological:      General: No focal deficit present.      Mental Status: She is alert and oriented to person, place, and time.                Significant Labs: All pertinent labs within the past 24 hours have been reviewed.    Significant Imaging: I have reviewed all pertinent imaging results/findings within the past 24 hours.

## 2024-05-22 LAB
ANION GAP SERPL CALC-SCNC: 13 MMOL/L (ref 8–16)
BUN SERPL-MCNC: 35 MG/DL (ref 8–23)
CALCIUM SERPL-MCNC: 8.5 MG/DL (ref 8.7–10.5)
CHLORIDE SERPL-SCNC: 108 MMOL/L (ref 95–110)
CO2 SERPL-SCNC: 16 MMOL/L (ref 23–29)
CREAT SERPL-MCNC: 0.8 MG/DL (ref 0.5–1.4)
ERYTHROCYTE [DISTWIDTH] IN BLOOD BY AUTOMATED COUNT: 13.7 % (ref 11.5–14.5)
EST. GFR  (NO RACE VARIABLE): >60 ML/MIN/1.73 M^2
GLUCOSE SERPL-MCNC: 71 MG/DL (ref 70–110)
HCT VFR BLD AUTO: 45.2 % (ref 37–48.5)
HGB BLD-MCNC: 14.8 G/DL (ref 12–16)
MCH RBC QN AUTO: 29.4 PG (ref 27–31)
MCHC RBC AUTO-ENTMCNC: 32.7 G/DL (ref 32–36)
MCV RBC AUTO: 90 FL (ref 82–98)
PLATELET # BLD AUTO: 180 K/UL (ref 150–450)
PMV BLD AUTO: 11.4 FL (ref 9.2–12.9)
POTASSIUM SERPL-SCNC: 4.1 MMOL/L (ref 3.5–5.1)
RBC # BLD AUTO: 5.03 M/UL (ref 4–5.4)
SODIUM SERPL-SCNC: 137 MMOL/L (ref 136–145)
WBC # BLD AUTO: 12.82 K/UL (ref 3.9–12.7)

## 2024-05-22 PROCEDURE — 21400001 HC TELEMETRY ROOM: Mod: HCNC

## 2024-05-22 PROCEDURE — 97116 GAIT TRAINING THERAPY: CPT | Mod: HCNC

## 2024-05-22 PROCEDURE — 97530 THERAPEUTIC ACTIVITIES: CPT | Mod: HCNC

## 2024-05-22 PROCEDURE — 85027 COMPLETE CBC AUTOMATED: CPT | Mod: HCNC | Performed by: INTERNAL MEDICINE

## 2024-05-22 PROCEDURE — 63600175 PHARM REV CODE 636 W HCPCS: Mod: HCNC | Performed by: STUDENT IN AN ORGANIZED HEALTH CARE EDUCATION/TRAINING PROGRAM

## 2024-05-22 PROCEDURE — 97162 PT EVAL MOD COMPLEX 30 MIN: CPT | Mod: HCNC

## 2024-05-22 PROCEDURE — 63600175 PHARM REV CODE 636 W HCPCS: Mod: HCNC | Performed by: REGISTERED NURSE

## 2024-05-22 PROCEDURE — 63600175 PHARM REV CODE 636 W HCPCS: Mod: HCNC | Performed by: INTERNAL MEDICINE

## 2024-05-22 PROCEDURE — 36415 COLL VENOUS BLD VENIPUNCTURE: CPT | Mod: HCNC | Performed by: INTERNAL MEDICINE

## 2024-05-22 PROCEDURE — 80048 BASIC METABOLIC PNL TOTAL CA: CPT | Mod: HCNC | Performed by: INTERNAL MEDICINE

## 2024-05-22 PROCEDURE — 25000003 PHARM REV CODE 250: Mod: HCNC | Performed by: INTERNAL MEDICINE

## 2024-05-22 PROCEDURE — 25000003 PHARM REV CODE 250: Mod: HCNC

## 2024-05-22 PROCEDURE — 25000003 PHARM REV CODE 250: Mod: HCNC | Performed by: STUDENT IN AN ORGANIZED HEALTH CARE EDUCATION/TRAINING PROGRAM

## 2024-05-22 RX ORDER — PROCHLORPERAZINE EDISYLATE 5 MG/ML
5 INJECTION INTRAMUSCULAR; INTRAVENOUS EVERY 6 HOURS PRN
Status: DISCONTINUED | OUTPATIENT
Start: 2024-05-22 | End: 2024-05-25 | Stop reason: HOSPADM

## 2024-05-22 RX ADMIN — PIPERACILLIN SODIUM AND TAZOBACTAM SODIUM 4.5 G: 4; .5 INJECTION, POWDER, LYOPHILIZED, FOR SOLUTION INTRAVENOUS at 12:05

## 2024-05-22 RX ADMIN — ONDANSETRON 4 MG: 2 INJECTION INTRAMUSCULAR; INTRAVENOUS at 11:05

## 2024-05-22 RX ADMIN — ONDANSETRON 4 MG: 2 INJECTION INTRAMUSCULAR; INTRAVENOUS at 04:05

## 2024-05-22 RX ADMIN — ACETAMINOPHEN 1000 MG: 500 TABLET ORAL at 09:05

## 2024-05-22 RX ADMIN — OXYCODONE 5 MG: 5 TABLET ORAL at 11:05

## 2024-05-22 RX ADMIN — OXYCODONE 10 MG: 5 TABLET ORAL at 06:05

## 2024-05-22 RX ADMIN — MUPIROCIN: 20 OINTMENT TOPICAL at 08:05

## 2024-05-22 RX ADMIN — SODIUM CHLORIDE: 9 INJECTION, SOLUTION INTRAVENOUS at 12:05

## 2024-05-22 RX ADMIN — HYDROMORPHONE HYDROCHLORIDE 0.2 MG: 1 INJECTION, SOLUTION INTRAMUSCULAR; INTRAVENOUS; SUBCUTANEOUS at 04:05

## 2024-05-22 RX ADMIN — PIPERACILLIN SODIUM AND TAZOBACTAM SODIUM 4.5 G: 4; .5 INJECTION, POWDER, LYOPHILIZED, FOR SOLUTION INTRAVENOUS at 06:05

## 2024-05-22 RX ADMIN — MUPIROCIN: 20 OINTMENT TOPICAL at 09:05

## 2024-05-22 RX ADMIN — PROCHLORPERAZINE EDISYLATE 5 MG: 5 INJECTION INTRAMUSCULAR; INTRAVENOUS at 08:05

## 2024-05-22 NOTE — PROGRESS NOTES
[facility] Surg  General Surgery  Progress Note    Subjective:     Interval History:   Had some nausea earlier, was given compazine and now sleeping      Post-Op Info:  Procedure(s) (LRB):  LAPAROTOMY, EXPLORATORY (N/A)   2 Days Post-Op      Medications:  Continuous Infusions:   sodium chloride 0.9%   Intravenous Continuous 75 mL/hr at 05/22/24 1200 New Bag at 05/22/24 1200     Scheduled Meds:   acetaminophen  1,000 mg Oral Q8H    mupirocin   Nasal BID    piperacillin-tazobactam (Zosyn) IV (PEDS and ADULTS) (extended infusion is not appropriate)  4.5 g Intravenous Q8H     PRN Meds:  Current Facility-Administered Medications:     dextrose 10%, 12.5 g, Intravenous, PRN    dextrose 10%, 25 g, Intravenous, PRN    glucagon (human recombinant), 1 mg, Intramuscular, PRN    glucose, 16 g, Oral, PRN    glucose, 24 g, Oral, PRN    HYDROmorphone, 0.2 mg, Intravenous, Q6H PRN    HYDROmorphone, 0.5 mg, Intravenous, Q4H PRN    naloxone, 0.02 mg, Intravenous, PRN    ondansetron, 4 mg, Intravenous, Q6H PRN    oxyCODONE, 10 mg, Oral, Q6H PRN    oxyCODONE, 5 mg, Oral, Q6H PRN    prochlorperazine, 5 mg, Intramuscular, Q6H PRN    sodium chloride 0.9%, 10 mL, Intravenous, PRN    sodium chloride 0.9%, 10 mL, Intravenous, Q12H PRN     Objective:     Vital Signs (Most Recent):  Temp: 96.8 °F (36 °C) (05/22/24 1533)  Pulse: 71 (05/22/24 1611)  Resp: 20 (05/22/24 1533)  BP: 135/62 (05/22/24 1533)  SpO2: 98 % (05/22/24 1533) Vital Signs (24h Range):  Temp:  [96.8 °F (36 °C)-98 °F (36.7 °C)] 96.8 °F (36 °C)  Pulse:  [65-76] 71  Resp:  [12-30] 20  SpO2:  [93 %-100 %] 98 %  BP: (103-145)/(55-73) 135/62       Intake/Output Summary (Last 24 hours) at 5/22/2024 1645  Last data filed at 5/22/2024 1200  Gross per 24 hour   Intake 1179.52 ml   Output 1035 ml   Net 144.52 ml       Physical Exam  Ab benign    Significant Labs:  CBC:   Recent Labs   Lab 05/22/24  0513   WBC 12.82*   RBC 5.03   HGB 14.8   HCT 45.2      MCV 90   MCH 29.4   MCHC  32.7     CMP:   Recent Labs   Lab 05/22/24  0513   GLU 71   CALCIUM 8.5*      K 4.1   CO2 16*      BUN 35*   CREATININE 0.8       Significant Diagnostics:  None    Assessment/Plan:     Active Diagnoses:    Diagnosis Date Noted POA    PRINCIPAL PROBLEM:  SBO (small bowel obstruction) [K56.609] 05/17/2024 Yes    Hyperkalemia [E87.5] 05/21/2024 No    ALEXEY (acute kidney injury) [N17.9] 05/20/2024 Unknown    Coronary artery disease involving native coronary artery of native heart without angina pectoris [I25.10] 04/05/2019 Yes    HTN (hypertension) [I10] 08/12/2018 Yes      Problems Resolved During this Admission:     S/p ex lap, Trevon  Labs good, wbc improving  Ok for fulls if she wants. Advance slowly      Meño Foster MD  General Surgery  [facility] Surg

## 2024-05-22 NOTE — PLAN OF CARE
Problem: Physical Therapy  Goal: Physical Therapy Goal  Description: Goals to be met by: 24     Patient will increase functional independence with mobility by performin. Supine to sit with Stand-by Assistance  2. Sit to supine with Stand-by Assistance  3. Sit to stand transfer with Modified [place] with use of LRAD.   4. Bed to chair transfer with Modified [place] using LRAD.   5. Gait  x 150 feet with Modified [place] using LRAD.     Outcome: Progressing    PT evaluation completed. Pt's PLOF: independent with no AD. Pt at this time requires MOD/MIN A with use of RW. Therapy recommending low intensity therapy-  PT and family assistance. Therapy will continue to progress pt as able.

## 2024-05-22 NOTE — ASSESSMENT & PLAN NOTE
Continue intermittent suction with NG tube.  IV hydration.  Continue Zosyn.  Surgery consult. POD#2 exploratory laparotomy  -upgraded diet to full liquid

## 2024-05-22 NOTE — SUBJECTIVE & OBJECTIVE
Interval History:  Patient was examined in her bed.  She was calm and not in distress.  She denies chest pain, palpitation, shortness and breath.  She was seen while she was participating with physical therapy.  She still has not had a bowel movement and states her abdomen is still mildly tender.    Review of Systems   Constitutional:  Negative for activity change, chills and fever.   HENT:  Negative for congestion, rhinorrhea and sore throat.    Eyes:  Negative for discharge and redness.   Respiratory:  Negative for cough, chest tightness, shortness of breath and wheezing.    Cardiovascular:  Negative for chest pain, palpitations and leg swelling.   Gastrointestinal:  Positive for abdominal pain. Negative for constipation, diarrhea, nausea and vomiting.   Genitourinary:  Negative for dysuria, flank pain and hematuria.   Musculoskeletal:  Negative for back pain, myalgias and neck pain.   Skin:  Negative for pallor, rash and wound.   Neurological:  Negative for dizziness, tremors, syncope, weakness, numbness and headaches.   Psychiatric/Behavioral:  Negative for agitation, confusion and hallucinations.    All other systems reviewed and are negative.    Objective:     Vital Signs (Most Recent):  Temp: 96.8 °F (36 °C) (05/22/24 1533)  Pulse: 71 (05/22/24 1611)  Resp: 20 (05/22/24 1533)  BP: 135/62 (05/22/24 1533)  SpO2: 98 % (05/22/24 1533) Vital Signs (24h Range):  Temp:  [96.8 °F (36 °C)-98 °F (36.7 °C)] 96.8 °F (36 °C)  Pulse:  [65-76] 71  Resp:  [12-30] 20  SpO2:  [93 %-100 %] 98 %  BP: (103-145)/(55-73) 135/62     Weight: 49 kg (108 lb 0.4 oz)  Body mass index is 22.58 kg/m².    Intake/Output Summary (Last 24 hours) at 5/22/2024 1703  Last data filed at 5/22/2024 1200  Gross per 24 hour   Intake 1129.89 ml   Output 1035 ml   Net 94.89 ml         Physical Exam  Vitals and nursing note reviewed.   Constitutional:       General: She is not in acute distress.  HENT:      Head: Normocephalic and atraumatic.       Mouth/Throat:      Mouth: Mucous membranes are moist.   Eyes:      General:         Right eye: No discharge.         Left eye: No discharge.      Conjunctiva/sclera: Conjunctivae normal.   Cardiovascular:      Rate and Rhythm: Normal rate and regular rhythm.      Pulses: Normal pulses.   Pulmonary:      Effort: Pulmonary effort is normal.      Breath sounds: Normal breath sounds.   Abdominal:      General: Bowel sounds are normal.      Palpations: Abdomen is soft.      Tenderness: There is abdominal tenderness.      Comments: Tenderness at surgery site   Musculoskeletal:         General: No swelling. Normal range of motion.      Cervical back: Normal range of motion and neck supple.   Skin:     General: Skin is warm and dry.   Neurological:      Mental Status: She is alert and oriented to person, place, and time. Mental status is at baseline.   Psychiatric:         Mood and Affect: Mood normal.             Significant Labs: All pertinent labs within the past 24 hours have been reviewed.    Significant Imaging: I have reviewed all pertinent imaging results/findings within the past 24 hours.

## 2024-05-22 NOTE — NURSING
2230 - Pt transferred to room 520. VSS, no distress noted. All belongings transferred with pt. Pt's daughter notified via phone.

## 2024-05-22 NOTE — PT/OT/SLP EVAL
Physical Therapy Evaluation and Treatment    Patient Name:  Alexandra Gleason   MRN:  [id number]    Recommendations:     Discharge Recommendations: Low Intensity Therapy (HH PT with increased family assistance)   Discharge Equipment Recommendations: walker, rolling   Barriers to discharge:  limited functional endurance; requires assistance with stability in stance    The mobility limitation cannot be sufficiently resolved by the use of a cane.   Patient's functional mobility deficit can be sufficiently resolved with the use of a rolling walker. Patient's mobility limitation significantly impairs their ability to participate in one of more activities of daily living. The use of a rolling walker will significantly improve the patient's ability to participate in MRADLS and the patient will use it on regular basis in the home.     Assessment:     Alexandra Gleason is a 89 y.o. female admitted with a medical diagnosis of SBO (small bowel obstruction).  She presents with the following impairments/functional limitations: weakness, impaired endurance, impaired self care skills, impaired functional mobility, gait instability, impaired balance, decreased lower extremity function, pain.      PT evaluation completed. Pt's PLOF: independent with no AD. Pt at this time requires MOD/MIN A with use of RW. Therapy recommending low intensity therapy- HH PT and family assistance. Therapy will continue to progress pt as able.    Rehab Prognosis: Good; patient would benefit from acute skilled PT services to address these deficits and reach maximum level of function.    Recent Surgery: Procedure(s) (LRB):  LAPAROTOMY, EXPLORATORY (N/A) 2 Days Post-Op    Plan:     During this hospitalization, patient to be seen 5 x/week to address the identified rehab impairments via gait training, therapeutic activities, therapeutic exercises, neuromuscular re-education and progress toward the following goals:    Plan of Care Expires:   06/22/24    Subjective     Chief Complaint: pain  Patient/Family Comments/goals: to return to PLOF  Pain/Comfort:  Pain Rating 1: 10/10  Location 1: abdomen  Pain Addressed 1: Reposition, Cessation of Activity, Nurse notified  Pain Rating Post-Intervention 1:  (not rated)    Patients cultural, spiritual, Rastafari conflicts given the current situation: no    Living Environment:  Lives with daughter and  in 1 story home in 20 stairs to enter (also elevator access). Grand-daughter lives next door.   Prior to admission, patients level of function was Independent with no AD.  Equipment used at home: none.  DME owned (not currently used): none.  Upon discharge, patient will have assistance from family.    Objective:     Communicated with Nurse prior to session.  Patient found HOB elevated with bed alarm, wheat catheter  upon PT entry to room.    General Precautions: Standard, fall  Orthopedic Precautions:N/A   Braces: N/A  Respiratory Status: Room air    Exams:  Cognitive Exam:  Patient is oriented to Person, Place, Time, and Situation  Sensation:    -       Intact  light/touch to BLE  RLE ROM: WFL  RLE Strength: 3-/5 hip flexion, knee extension and ankle PF/DF  LLE ROM: WFL  LLE Strength: 3-/5 hip flexion, knee extension and ankle PF/DF    Functional Mobility:  Bed Mobility:     Supine to Sit: moderate assistance  Transfers:     Sit to Stand:  moderate assistance with rolling walker  Bed to Chair: minimum assistance with  rolling walker  using  Step Transfer  Gait: ~20ft with use of RW and MOD/MIN A      AM-PAC 6 CLICK MOBILITY  Total Score:13       Treatment & Education:  Pt educated on role of PT.   Pt with mild nausea but no stated dizziness.   Pt requires increased MOD A with initial stand but able to increase to MIN A with ambulation with increased distance. Pt has noted slow gait speed with limited step length/height and dependence on RW at this time to increase stability due to pain at this time which  alters stability in stance.  Pt educated to call for assistance when ready to transfer back to bed from chair.   Pt/family understanding of all education provided.     Patient left up in chair with all lines intact, call button in reach, chair alarm on, Nurse notified, and family present.    GOALS:   Multidisciplinary Problems       Physical Therapy Goals          Problem: Physical Therapy    Goal Priority Disciplines Outcome Goal Variances Interventions   Physical Therapy Goal     PT, PT/OT Progressing     Description: Goals to be met by: 24     Patient will increase functional independence with mobility by performin. Supine to sit with Stand-by Assistance  2. Sit to supine with Stand-by Assistance  3. Sit to stand transfer with Modified [place] with use of LRAD.   4. Bed to chair transfer with Modified [place] using LRAD.   5. Gait  x 150 feet with Modified [place] using LRAD.                          History:     Past Medical History:   Diagnosis Date    High cholesterol     Hypertension        Past Surgical History:   Procedure Laterality Date    BACK SURGERY      CHOLECYSTECTOMY      HYSTERECTOMY      LAPAROTOMY, EXPLORATORY N/A 2024    Procedure: LAPAROTOMY, EXPLORATORY;  Surgeon: Meño Foster MD;  Location: [facility];  Service: General;  Laterality: N/A;    NECK SURGERY         Time Tracking:     PT Received On: 24  PT Start Time: 1115     PT Stop Time: 1149  PT Total Time (min): 34 min    Billable Minutes: Evaluation 10, Gait Training 10, and Therapeutic Activity 10      2024

## 2024-05-22 NOTE — PROGRESS NOTES
Pharmacist Renal Dose Adjustment Note    Alexandra Gleason is a 89 y.o. female being treated with the medication zosyn    Patient Data:    Vital Signs (Most Recent):  Temp: 97.4 °F (36.3 °C) (05/22/24 0725)  Pulse: 70 (05/22/24 0725)  Resp: 20 (05/22/24 0725)  BP: (!) 143/73 (05/22/24 0725)  SpO2: 100 % (05/22/24 0725) Vital Signs (72h Range):  Temp:  [96.9 °F (36.1 °C)-99 °F (37.2 °C)]   Pulse:  []   Resp:  [11-30]   BP: ()/(49-73)   SpO2:  [93 %-100 %]      Recent Labs   Lab 05/20/24  1807 05/21/24  0651 05/22/24  0513   CREATININE 1.5* 1.1 0.8     Serum creatinine: 0.8 mg/dL 05/22/24 0513  Estimated creatinine clearance: 34.8 mL/min    Medication:Zosyn dose: 4.5g frequency q12 hours will be changed to medication:zosyn dose:4.5g frequency:q 8 hrs    Pharmacist's Name: Caral Cordon  Pharmacist's Extension: [phone]

## 2024-05-22 NOTE — PLAN OF CARE
The sw spoke to the pt's granddaughter Amara [phone] in the pt's room who states they're unsure if the pt will need hh or a rw at d/c. She states they will discuss it with the pt and the rest of the family and let the sw know when the pt's near ready for d/c what their decision is. The sw also told them therapy recommends a rw but they want to place that on hold too b/c they aren't venecia the pt will need a rw either. The family didn't have a preference if they decide on hh,so the sw faxed the pt's info to [facility] via [employer].       05/22/24 7359   Post-Acute Status   Post-Acute Authorization Home Health   Home Health Status Referrals Sent   Discharge Plan   Discharge Plan A Home Health   Discharge Plan B Other  (TBD)

## 2024-05-22 NOTE — PLAN OF CARE
Aaox4. Complaints of pain and nausea around 0430, Prn medications administered. Morton in place. No BM during night. Tele monitored. Safety precautions maintained. Call bell within reach.

## 2024-05-22 NOTE — PROGRESS NOTES
[facility] Medicine  Progress Note    Patient Name: Alexandra Gleason  MRN: [id number]  Patient Class: IP- Inpatient   Admission Date: 5/20/2024  Length of Stay: 2 days  Attending Physician: Marcelino Reed MD  Primary Care Provider: Carla Butler MD        Subjective:     Principal Problem:SBO (small bowel obstruction)        HPI:  89 years old woman with past medical history of hypertension, hyperlipidemia, coronary artery disease, hysterectomy, cholecystectomy, and appendicectomy.  The patient is very functional at baseline.  She presented to outside facility for abdominal pain, constipation and she was found to have a small-bowel obstruction evident by CT.  The patient failed conservative therapy and she was referred to our hospital for surgical intervention.      Overview/Hospital Course:  No notes on file    Interval History:  Patient was examined in her bed.  She was calm and not in distress.  She denies chest pain, palpitation, shortness and breath.  She was seen while she was participating with physical therapy.  She still has not had a bowel movement and states her abdomen is still mildly tender.    Review of Systems   Constitutional:  Negative for activity change, chills and fever.   HENT:  Negative for congestion, rhinorrhea and sore throat.    Eyes:  Negative for discharge and redness.   Respiratory:  Negative for cough, chest tightness, shortness of breath and wheezing.    Cardiovascular:  Negative for chest pain, palpitations and leg swelling.   Gastrointestinal:  Positive for abdominal pain. Negative for constipation, diarrhea, nausea and vomiting.   Genitourinary:  Negative for dysuria, flank pain and hematuria.   Musculoskeletal:  Negative for back pain, myalgias and neck pain.   Skin:  Negative for pallor, rash and wound.   Neurological:  Negative for dizziness, tremors, syncope, weakness, numbness and headaches.   Psychiatric/Behavioral:  Negative for agitation, confusion and  hallucinations.    All other systems reviewed and are negative.    Objective:     Vital Signs (Most Recent):  Temp: 96.8 °F (36 °C) (05/22/24 1533)  Pulse: 71 (05/22/24 1611)  Resp: 20 (05/22/24 1533)  BP: 135/62 (05/22/24 1533)  SpO2: 98 % (05/22/24 1533) Vital Signs (24h Range):  Temp:  [96.8 °F (36 °C)-98 °F (36.7 °C)] 96.8 °F (36 °C)  Pulse:  [65-76] 71  Resp:  [12-30] 20  SpO2:  [93 %-100 %] 98 %  BP: (103-145)/(55-73) 135/62     Weight: 49 kg (108 lb 0.4 oz)  Body mass index is 22.58 kg/m².    Intake/Output Summary (Last 24 hours) at 5/22/2024 1703  Last data filed at 5/22/2024 1200  Gross per 24 hour   Intake 1129.89 ml   Output 1035 ml   Net 94.89 ml         Physical Exam  Vitals and nursing note reviewed.   Constitutional:       General: She is not in acute distress.  HENT:      Head: Normocephalic and atraumatic.      Mouth/Throat:      Mouth: Mucous membranes are moist.   Eyes:      General:         Right eye: No discharge.         Left eye: No discharge.      Conjunctiva/sclera: Conjunctivae normal.   Cardiovascular:      Rate and Rhythm: Normal rate and regular rhythm.      Pulses: Normal pulses.   Pulmonary:      Effort: Pulmonary effort is normal.      Breath sounds: Normal breath sounds.   Abdominal:      General: Bowel sounds are normal.      Palpations: Abdomen is soft.      Tenderness: There is abdominal tenderness.      Comments: Tenderness at surgery site   Musculoskeletal:         General: No swelling. Normal range of motion.      Cervical back: Normal range of motion and neck supple.   Skin:     General: Skin is warm and dry.   Neurological:      Mental Status: She is alert and oriented to person, place, and time. Mental status is at baseline.   Psychiatric:         Mood and Affect: Mood normal.             Significant Labs: All pertinent labs within the past 24 hours have been reviewed.    Significant Imaging: I have reviewed all pertinent imaging results/findings within the past 24  hours.    Assessment/Plan:      * SBO (small bowel obstruction)    Continue intermittent suction with NG tube.  IV hydration.  Continue Zosyn.  Surgery consult. POD#2 exploratory laparotomy  -upgraded diet to full liquid    Hyperkalemia  This patient has hyperkalemia which is uncontrolled. We will monitor for arrhythmias with EKG or continuous telemetry. We will treat the hyperkalemia with Potassium Binders and repeat labs . The likely etiology of the hyperkalemia is  patient was on potassium chloride IV .  The patients latest potassium has been reviewed and the results are listed below  Recent Labs   Lab 05/22/24  0513   K 4.1       -stop potassium chloride IV  -repeat labs  -continue with normal saline          ALEXEY (acute kidney injury)  Likely prerenal  Avoid nephrotoxin  IV hydration as above  Renal function improving    Coronary artery disease involving native coronary artery of native heart without angina pectoris  No acute issues  Continue to monitor    HTN (hypertension)  Hold home blood pressure medications      VTE Risk Mitigation (From admission, onward)           Ordered     Place sequential compression device  Until discontinued         05/20/24 1734     IP VTE HIGH RISK PATIENT  Once         05/20/24 1734     Reason for No Pharmacological VTE Prophylaxis  Once        Question:  Reasons:  Answer:  Physician Provided (leave comment)  Comment:  pending surgery    05/20/24 1734                    Discharge Planning   AMADO:      Code Status: Full Code   Is the patient medically ready for discharge?:     Reason for patient still in hospital (select all that apply): Patient trending condition, Treatment, and Consult recommendations  Discharge Plan A: Home Health                  Marcelino Reed MD  Department of Hospital Medicine   [facility] Surg

## 2024-05-22 NOTE — PLAN OF CARE
Problem: Adult Inpatient Plan of Care  Goal: Plan of Care Review  Outcome: Progressing  Goal: Optimal Comfort and Wellbeing  Outcome: Progressing     Problem: Sepsis/Septic Shock  Goal: Optimal Nutrition Intake  Outcome: Progressing     Problem: Fall Injury Risk  Goal: Absence of Fall and Fall-Related Injury  Outcome: Progressing

## 2024-05-22 NOTE — ASSESSMENT & PLAN NOTE
This patient has hyperkalemia which is uncontrolled. We will monitor for arrhythmias with EKG or continuous telemetry. We will treat the hyperkalemia with Potassium Binders and repeat labs . The likely etiology of the hyperkalemia is  patient was on potassium chloride IV .  The patients latest potassium has been reviewed and the results are listed below  Recent Labs   Lab 05/22/24  0513   K 4.1       -stop potassium chloride IV  -repeat labs  -continue with normal saline

## 2024-05-23 LAB
ANION GAP SERPL CALC-SCNC: 11 MMOL/L (ref 8–16)
BUN SERPL-MCNC: 25 MG/DL (ref 8–23)
CALCIUM SERPL-MCNC: 8.1 MG/DL (ref 8.7–10.5)
CHLORIDE SERPL-SCNC: 110 MMOL/L (ref 95–110)
CO2 SERPL-SCNC: 15 MMOL/L (ref 23–29)
CREAT SERPL-MCNC: 0.8 MG/DL (ref 0.5–1.4)
ERYTHROCYTE [DISTWIDTH] IN BLOOD BY AUTOMATED COUNT: 13.4 % (ref 11.5–14.5)
EST. GFR  (NO RACE VARIABLE): >60 ML/MIN/1.73 M^2
GLUCOSE SERPL-MCNC: 67 MG/DL (ref 70–110)
HCT VFR BLD AUTO: 38.6 % (ref 37–48.5)
HGB BLD-MCNC: 12.5 G/DL (ref 12–16)
MCH RBC QN AUTO: 29.5 PG (ref 27–31)
MCHC RBC AUTO-ENTMCNC: 32.4 G/DL (ref 32–36)
MCV RBC AUTO: 91 FL (ref 82–98)
PLATELET # BLD AUTO: 168 K/UL (ref 150–450)
PMV BLD AUTO: 11.5 FL (ref 9.2–12.9)
POTASSIUM SERPL-SCNC: 4.1 MMOL/L (ref 3.5–5.1)
RBC # BLD AUTO: 4.24 M/UL (ref 4–5.4)
SODIUM SERPL-SCNC: 136 MMOL/L (ref 136–145)
WBC # BLD AUTO: 8.96 K/UL (ref 3.9–12.7)

## 2024-05-23 PROCEDURE — 63600175 PHARM REV CODE 636 W HCPCS: Mod: HCNC | Performed by: INTERNAL MEDICINE

## 2024-05-23 PROCEDURE — 36415 COLL VENOUS BLD VENIPUNCTURE: CPT | Mod: HCNC | Performed by: INTERNAL MEDICINE

## 2024-05-23 PROCEDURE — 97116 GAIT TRAINING THERAPY: CPT | Mod: HCNC,CQ

## 2024-05-23 PROCEDURE — 25000003 PHARM REV CODE 250: Mod: HCNC | Performed by: FAMILY MEDICINE

## 2024-05-23 PROCEDURE — 25000003 PHARM REV CODE 250: Mod: HCNC | Performed by: INTERNAL MEDICINE

## 2024-05-23 PROCEDURE — 80048 BASIC METABOLIC PNL TOTAL CA: CPT | Mod: HCNC | Performed by: INTERNAL MEDICINE

## 2024-05-23 PROCEDURE — 63600175 PHARM REV CODE 636 W HCPCS: Mod: HCNC | Performed by: STUDENT IN AN ORGANIZED HEALTH CARE EDUCATION/TRAINING PROGRAM

## 2024-05-23 PROCEDURE — 21400001 HC TELEMETRY ROOM: Mod: HCNC

## 2024-05-23 PROCEDURE — 25000003 PHARM REV CODE 250: Mod: HCNC

## 2024-05-23 PROCEDURE — 85027 COMPLETE CBC AUTOMATED: CPT | Mod: HCNC | Performed by: INTERNAL MEDICINE

## 2024-05-23 PROCEDURE — 97110 THERAPEUTIC EXERCISES: CPT | Mod: HCNC,CQ

## 2024-05-23 RX ORDER — LISINOPRIL 10 MG/1
10 TABLET ORAL DAILY
Status: DISCONTINUED | OUTPATIENT
Start: 2024-05-23 | End: 2024-05-25 | Stop reason: HOSPADM

## 2024-05-23 RX ORDER — ATORVASTATIN CALCIUM 20 MG/1
20 TABLET, FILM COATED ORAL DAILY
Status: DISCONTINUED | OUTPATIENT
Start: 2024-05-23 | End: 2024-05-25 | Stop reason: HOSPADM

## 2024-05-23 RX ORDER — ALUMINUM HYDROXIDE, MAGNESIUM HYDROXIDE, AND SIMETHICONE 1200; 120; 1200 MG/30ML; MG/30ML; MG/30ML
30 SUSPENSION ORAL EVERY 6 HOURS PRN
Status: DISCONTINUED | OUTPATIENT
Start: 2024-05-23 | End: 2024-05-25 | Stop reason: HOSPADM

## 2024-05-23 RX ADMIN — PIPERACILLIN SODIUM AND TAZOBACTAM SODIUM 4.5 G: 4; .5 INJECTION, POWDER, LYOPHILIZED, FOR SOLUTION INTRAVENOUS at 12:05

## 2024-05-23 RX ADMIN — ATORVASTATIN CALCIUM 20 MG: 20 TABLET, FILM COATED ORAL at 09:05

## 2024-05-23 RX ADMIN — ACETAMINOPHEN 1000 MG: 500 TABLET ORAL at 06:05

## 2024-05-23 RX ADMIN — LISINOPRIL 10 MG: 10 TABLET ORAL at 09:05

## 2024-05-23 RX ADMIN — PIPERACILLIN SODIUM AND TAZOBACTAM SODIUM 4.5 G: 4; .5 INJECTION, POWDER, LYOPHILIZED, FOR SOLUTION INTRAVENOUS at 06:05

## 2024-05-23 RX ADMIN — MUPIROCIN: 20 OINTMENT TOPICAL at 09:05

## 2024-05-23 RX ADMIN — OXYCODONE 10 MG: 5 TABLET ORAL at 02:05

## 2024-05-23 RX ADMIN — ONDANSETRON 4 MG: 2 INJECTION INTRAMUSCULAR; INTRAVENOUS at 09:05

## 2024-05-23 RX ADMIN — OXYCODONE 5 MG: 5 TABLET ORAL at 09:05

## 2024-05-23 RX ADMIN — MUPIROCIN: 20 OINTMENT TOPICAL at 08:05

## 2024-05-23 RX ADMIN — PIPERACILLIN SODIUM AND TAZOBACTAM SODIUM 4.5 G: 4; .5 INJECTION, POWDER, LYOPHILIZED, FOR SOLUTION INTRAVENOUS at 03:05

## 2024-05-23 RX ADMIN — ALUMINUM HYDROXIDE, MAGNESIUM HYDROXIDE, AND SIMETHICONE 30 ML: 1200; 120; 1200 SUSPENSION ORAL at 10:05

## 2024-05-23 RX ADMIN — OXYCODONE 10 MG: 5 TABLET ORAL at 09:05

## 2024-05-23 NOTE — PLAN OF CARE
Problem: Physical Therapy  Goal: Physical Therapy Goal  Description: Goals to be met by: 24     Patient will increase functional independence with mobility by performin. Supine to sit with Stand-by Assistance  2. Sit to supine with Stand-by Assistance  3. Sit to stand transfer with Modified [place] with use of LRAD.   4. Bed to chair transfer with Modified [place] using LRAD.   5. Gait  x 150 feet with Modified [place] using LRAD.     Outcome: Progressing     Pt ambulated ~100ft using RW and SBA

## 2024-05-23 NOTE — PT/OT/SLP PROGRESS
Physical Therapy Treatment    Patient Name:  Alexandra Gleason   MRN:  [id number]    Recommendations:     Discharge Recommendations: Low Intensity Therapy (HH PT with increased family assistance)  Discharge Equipment Recommendations: walker, rolling  Barriers to discharge: None    Assessment:     Alexandra Gleason is a 89 y.o. female admitted with a medical diagnosis of SBO (small bowel obstruction).  She presents with the following impairments/functional limitations: weakness, impaired endurance, impaired self care skills, impaired functional mobility, gait instability, impaired balance, decreased lower extremity function, pain.    Pt ambulated ~100ft using RW and SBA    Rehab Prognosis: Good; patient would benefit from acute skilled PT services to address these deficits and reach maximum level of function.    Recent Surgery: Procedure(s) (LRB):  LAPAROTOMY, EXPLORATORY (N/A) 3 Days Post-Op    Plan:     During this hospitalization, patient to be seen 5 x/week to address the identified rehab impairments via gait training, therapeutic activities, therapeutic exercises, neuromuscular re-education and progress toward the following goals:    Plan of Care Expires:  06/22/24    Subjective     Chief Complaint: dizziness sitting EOB  Patient/Family Comments/goals: Pt agreed to therapy  Pain/Comfort:  Pain Rating 1: 0/10      Objective:     Communicated with Shreya prior to session.  Patient found HOB elevated with bed alarm, peripheral IV, SCD, telemetry upon PT entry to room.     General Precautions: Standard, fall  Orthopedic Precautions: N/A  Braces: N/A  Respiratory Status: Room air     Functional Mobility:  Bed Mobility:     Rolling Right: contact guard assistance  Scooting: contact guard assistance to scoot EOB  Supine to Sit: Minimum assistance for trunk control  Transfers: Gait belt donned prior to transfer for safety  Sit to Stand:  contact guard assistance with rolling walker  Gait: Pt ambulated ~100ft using RW  and SBA. Pt with decreased abe, step length, endurance, flex trunk, and weight shifting. Pt required verbal cueing for upright posture, safety awareness when turning in RW, and RW management.  Balance: Pt with Fair- dynamic standing balance, Fair dynamic sitting balance      AM-PAC 6 CLICK MOBILITY  Turning over in bed (including adjusting bedclothes, sheets and blankets)?: 3  Sitting down on and standing up from a chair with arms (e.g., wheelchair, bedside commode, etc.): 3  Moving from lying on back to sitting on the side of the bed?: 3  Moving to and from a bed to a chair (including a wheelchair)?: 3  Need to walk in hospital room?: 3  Climbing 3-5 steps with a railing?: 3  Basic Mobility Total Score: 18       Treatment & Education:  Pt instructed to perform seated LAQ, HR, hip flexion 2x10 each, verbal cueing to ensure proper sequencing    Patient left up in chair with all lines intact, heels offloaded with pillow, call button in reach, nursing notified, and family present..    GOALS:   Multidisciplinary Problems       Physical Therapy Goals          Problem: Physical Therapy    Goal Priority Disciplines Outcome Goal Variances Interventions   Physical Therapy Goal     PT, PT/OT Progressing     Description: Goals to be met by: 24     Patient will increase functional independence with mobility by performin. Supine to sit with Stand-by Assistance  2. Sit to supine with Stand-by Assistance  3. Sit to stand transfer with Modified [place] with use of LRAD.   4. Bed to chair transfer with Modified [place] using LRAD.   5. Gait  x 150 feet with Modified [place] using LRAD.                          Time Tracking:     PT Received On: 24  PT Start Time: 956     PT Stop Time: 1024  PT Total Time (min): 28 min     Billable Minutes: Gait Training 15 and Therapeutic Exercise 13    Treatment Type: Treatment  PT/PTA: PTA     Number of PTA visits since last PT visit: 2024

## 2024-05-23 NOTE — PLAN OF CARE
AAOx4. C/o abdominal pain, PRN pain meds given. Tolerating full liquids though very little PO intake. IV ABX given per MAR. Ambulating with walker and assist. Midline incision with staples RUPA. Cardiac monitoring maintained. Bed locked in lowest position, bed alarm set and call bell within reach.

## 2024-05-23 NOTE — SUBJECTIVE & OBJECTIVE
Interval History:  Patient was examined in her chair, with family by her side.  She was calm did not appear to be in distress.  She denies chest pain, palpitation, shortness and breath,  symptoms but complains of abdominal discomfort with palpation.  She states her abdominal pain has improved since yesterday but is still there.  She denies passing gas no bowel movement today.    Review of Systems   Constitutional:  Negative for activity change, chills and fever.   HENT:  Negative for congestion, rhinorrhea and sore throat.    Eyes:  Negative for discharge and redness.   Respiratory:  Negative for cough, chest tightness, shortness of breath and wheezing.    Cardiovascular:  Negative for chest pain, palpitations and leg swelling.   Gastrointestinal:  Positive for abdominal pain. Negative for constipation, diarrhea, nausea and vomiting.   Genitourinary:  Negative for dysuria, flank pain and hematuria.   Musculoskeletal:  Negative for back pain, myalgias and neck pain.   Skin:  Negative for pallor, rash and wound.   Neurological:  Negative for dizziness, tremors, syncope, weakness, numbness and headaches.   Psychiatric/Behavioral:  Negative for agitation, confusion and hallucinations.    All other systems reviewed and are negative.    Objective:     Vital Signs (Most Recent):  Temp: 97.9 °F (36.6 °C) (05/23/24 1112)  Pulse: 72 (05/23/24 1221)  Resp: 18 (05/23/24 1112)  BP: (!) 142/69 (05/23/24 1112)  SpO2: 98 % (05/23/24 1112) Vital Signs (24h Range):  Temp:  [96.8 °F (36 °C)-98.4 °F (36.9 °C)] 97.9 °F (36.6 °C)  Pulse:  [60-76] 72  Resp:  [18-20] 18  SpO2:  [97 %-98 %] 98 %  BP: (135-177)/(62-72) 142/69     Weight: 55.6 kg (122 lb 9.2 oz)  Body mass index is 25.62 kg/m².    Intake/Output Summary (Last 24 hours) at 5/23/2024 1443  Last data filed at 5/23/2024 0608  Gross per 24 hour   Intake 1104.2 ml   Output 230 ml   Net 874.2 ml         Physical Exam  Vitals and nursing note reviewed.   Constitutional:        General: She is not in acute distress.  HENT:      Head: Normocephalic and atraumatic.      Mouth/Throat:      Mouth: Mucous membranes are moist.   Eyes:      General:         Right eye: No discharge.         Left eye: No discharge.      Conjunctiva/sclera: Conjunctivae normal.   Cardiovascular:      Rate and Rhythm: Normal rate and regular rhythm.      Pulses: Normal pulses.   Pulmonary:      Effort: Pulmonary effort is normal.      Breath sounds: Normal breath sounds.   Abdominal:      General: Bowel sounds are normal.      Palpations: Abdomen is soft.      Tenderness: There is abdominal tenderness.      Comments: Tenderness at surgery site   Musculoskeletal:         General: No swelling. Normal range of motion.      Cervical back: Normal range of motion and neck supple.   Skin:     General: Skin is warm and dry.   Neurological:      Mental Status: She is alert and oriented to person, place, and time. Mental status is at baseline.   Psychiatric:         Mood and Affect: Mood normal.             Significant Labs: All pertinent labs within the past 24 hours have been reviewed.    Significant Imaging: I have reviewed all pertinent imaging results/findings within the past 24 hours.

## 2024-05-23 NOTE — ASSESSMENT & PLAN NOTE
Continue intermittent suction with NG tube.  IV hydration.  Continue Zosyn.  Surgery consult. POD#3 exploratory laparotomy  -upgraded diet to full liquid  -denies passing gas no bowel movement today.  Surgeon aware  -OOB

## 2024-05-23 NOTE — ED PROVIDER NOTES
Encounter Date: 5/17/2024       History     Chief Complaint   Patient presents with    Vomiting    Abdominal Pain     HPI  89-year-old female with a history of hypertension, hyperlipidemia, coronary artery disease with previous cholecystectomy and appendectomy that presents to the ED with lower abdominal pain.  Per family she has had multiple episodes of vomiting and inability to tolerate p.o..  She has been more constipated than normal.  She denies any chest pain, shortness of breath.  No fevers, chills, sick contacts.  No dysuria, hematuria.  Review of patient's allergies indicates:   Allergen Reactions    Iodine and iodide containing products      Past Medical History:   Diagnosis Date    High cholesterol     Hypertension      Past Surgical History:   Procedure Laterality Date    BACK SURGERY      CHOLECYSTECTOMY      HYSTERECTOMY      LAPAROTOMY, EXPLORATORY N/A 5/20/2024    Procedure: LAPAROTOMY, EXPLORATORY;  Surgeon: Meño Foster MD;  Location: [facility];  Service: General;  Laterality: N/A;    NECK SURGERY       No family history on file.  Social History     Tobacco Use    Smoking status: Never   Substance Use Topics    Alcohol use: No    Drug use: No     Review of Systems   Constitutional:  Negative for chills and fever.   HENT:  Negative for congestion and sore throat.    Respiratory:  Negative for shortness of breath.    Cardiovascular:  Negative for chest pain.   Gastrointestinal:  Positive for abdominal pain, constipation, nausea and vomiting.   Endocrine: Negative for polyuria.   Genitourinary:  Negative for dysuria.   Musculoskeletal:  Negative for back pain.   Skin:  Negative for rash.   Neurological:  Negative for facial asymmetry and headaches.   Psychiatric/Behavioral:  Negative for behavioral problems.    All other systems reviewed and are negative.      Physical Exam     Initial Vitals [05/17/24 0848]   BP Pulse Resp Temp SpO2   (!) 150/72 83 18 98.3 °F (36.8 °C) 97 %      MAP       --          Physical Exam    Constitutional: She appears well-developed and well-nourished.   HENT:   Head: Normocephalic and atraumatic.   Eyes: EOM are normal. Pupils are equal, round, and reactive to light.   Neck:   Normal range of motion.  Cardiovascular:            S1, S2, no murmurs   Pulmonary/Chest: Breath sounds normal. No respiratory distress. She has no wheezes.   Abdominal: She exhibits distension (minimal). There is abdominal tenderness. There is no rebound and no guarding.   Musculoskeletal:         General: Normal range of motion.      Cervical back: Normal range of motion.     Neurological: She is alert and oriented to person, place, and time.   Skin: Skin is warm and dry.   Psychiatric: She has a normal mood and affect.         ED Course   Procedures  Labs Reviewed   URINALYSIS, REFLEX TO URINE CULTURE - Abnormal; Notable for the following components:       Result Value    Specific Gravity, UA >=1.030 (*)     Protein, UA 2+ (*)     Ketones, UA 1+ (*)     Bilirubin (UA) 2+ (*)     All other components within normal limits    Narrative:     Specimen Source->Urine   CBC W/ AUTO DIFFERENTIAL - Abnormal; Notable for the following components:    Mono # 1.2 (*)     Gran % 75.1 (*)     Lymph % 11.7 (*)     All other components within normal limits   COMPREHENSIVE METABOLIC PANEL - Abnormal; Notable for the following components:    Potassium 3.3 (*)     Glucose 127 (*)     BUN 41 (*)     eGFR 39 (*)     Anion Gap 20 (*)     All other components within normal limits   INFLUENZA A & B BY MOLECULAR   SARS-COV-2 RNA AMPLIFICATION, QUAL   LIPASE   URINALYSIS MICROSCOPIC    Narrative:     Specimen Source->Urine        ECG Results    None       Imaging Results              X-Ray Abdomen AP 1 View (KUB) (Final result)  Result time 05/17/24 14:19:54      Final result by Rosa Galo MD (05/17/24 14:19:54)                   Impression:      As above.      Electronically signed by: Rosa Galo  MD  Date:    05/17/2024  Time:    14:19               Narrative:    EXAMINATION:  XR ABDOMEN AP 1 VIEW    TECHNIQUE:  XR ABDOMEN AP 1 VIEW    COMPARISON:  05/17/2024    FINDINGS:  There is been interval placement of an enteric tube which terminates in the gastric body.                                       CT Abdomen Pelvis  Without Contrast (Final result)  Result time 05/17/24 12:22:24      Final result by Rosa Galo MD (05/17/24 12:22:24)                   Impression:      Imaging findings suggestive for a small-bowel obstruction with the zone of transition presumably in the lower pelvis at midline (series 2, image 132).  No free air is seen.    Constipation.  Diverticulosis coli without diverticulitis.    Additional nonemergent findings as above.    COMMUNICATION  This critical result was discovered/received at 12:20.  The critical information above was relayed directly by me via secure chat to Dr. Lovely Bledsoe on 05/17/2024 at 12:22.      Electronically signed by: Rosa Galo MD  Date:    05/17/2024  Time:    12:22               Narrative:    EXAMINATION:  CT ABDOMEN PELVIS WITHOUT CONTRAST    CLINICAL HISTORY:  Abdominal pain, acute, nonlocalized;    TECHNIQUE:  Low dose axial images, sagittal and coronal reformations were obtained from the lung bases to the pubic symphysis.  Oral contrast was not administered.    COMPARISON:  None    FINDINGS:  Bibasilar subsegmental atelectasis.  No consolidation or pleural effusions.  Base of the heart shows calcified coronary artery disease.  Calcified atheromatous disease affects the aorta and its major branch vessels.    The gallbladder has been removed.  No intrahepatic or extrahepatic biliary ductal dilatation is identified.  The unenhanced liver, spleen, pancreas and adrenal glands appear normal.  Both kidneys are normal in size, shape and contour.  There is a small hypodensity at the lower pole of the left kidney, incompletely evaluated.  No  nephrolithiasis, ureterolithiasis, hydronephrosis or hydroureter is seen.  The urinary bladder is partially distended and appears normal.  The uterus has been removed.  Low-density structure along the left pelvic sidewall measures 4.1 x 1.8 cm in transverse dimension and approximately 3.9 cm in craniocaudal dimension, indeterminate, appearing separate from the bowel and unchanged as compared to a CT scan of the lumbar spine from 2019.    There are multiple dilated loops of small bowel which are fluid-filled throughout the abdomen and pelvis with decompression of distal small bowel loops concerning for obstruction.  The transition zone is difficult to identify however is likely somewhere in the lower pelvis.  Constipation.  Diverticulosis coli without diverticulitis.  No free air or free fluid.  No pathologically enlarged abdominal or pelvic lymph nodes are seen.    The bones are osteopenic and show age-appropriate degenerative change.  Compression deformity of the inferior endplate of L2, stable.                                       Medications   Amino acid 4.25% - dextrose 5% (CLINIMIX-E) solution with additives (1L provides 42.5 gm AA, 50 gm CHO (170 kcal/L dextrose), Na 35, K 30, Mg 5, Ca 4.5, Acetate 70, Cl 39, Phos 15) ( Intravenous Stopped 5/20/24 0919)   0.9%  NaCl infusion (1,000 mLs Intravenous New Bag 5/17/24 1145)   benzocaine 20 % oral spray (1 each Mouth/Throat Given 5/17/24 1338)   HYDROmorphone injection 0.5 mg (0.5 mg Intravenous Given 5/18/24 2009)   ondansetron injection 4 mg (4 mg Intravenous Given 5/19/24 1704)   HYDROmorphone injection 0.5 mg (0.5 mg Intravenous Given 5/19/24 2202)   vancomycin (VANCOCIN) 1,000 mg in dextrose 5 % (D5W) 250 mL IVPB (Vial-Mate) (0 mg Intravenous Stopped 5/20/24 1452)     Medical Decision Making  89-year-old female with a history of hypertension, hyperlipidemia, coronary artery disease that presents to the ED with pain in abdomen.  She states inability to tolerate  p.o. as well as increased constipation.  On my exam she was tender diffusely.  No rebound tenderness noted.  Hyperactive bowel sounds.  Blood pressure 110/74, hr 80s, afebrile.  Reviewed labs and imaging.  Patient was discussed with Dr. Moser who agrees with patient being admitted with NG tube placement.  NG tube was placed while in the ED. she is admitted to hospitalist service.  Patient and family understand plan of care.  Patient is a full code.  Dr. Mosre will evaluate patient for further recommendations.    Amount and/or Complexity of Data Reviewed  Labs: ordered.  Radiology: ordered.    Risk  Prescription drug management.  Decision regarding hospitalization.               ED Course as of 05/23/24 1505   Fri May 17, 2024   1254 Discussed with dr moser who recommends admission to the hospitalist service and placement of ng tube. [FP]      ED Course User Index  [FP] Lovely Bledsoe MD                           Clinical Impression:  Final diagnoses:  [K56.609] Small bowel obstruction  [Z01.89] Encounter for imaging study to confirm nasogastric (NG) tube placement          ED Disposition Condition    Admit Fair                Lovely Bledsoe MD  05/23/24 1506

## 2024-05-23 NOTE — ASSESSMENT & PLAN NOTE
This patient has hyperkalemia which is uncontrolled. We will monitor for arrhythmias with EKG or continuous telemetry. We will treat the hyperkalemia with Potassium Binders and repeat labs . The likely etiology of the hyperkalemia is  patient was on potassium chloride IV .  The patients latest potassium has been reviewed and the results are listed below  Recent Labs   Lab 05/23/24  0503   K 4.1       -stop potassium chloride IV  -repeat labs  -continue with normal saline

## 2024-05-23 NOTE — ASSESSMENT & PLAN NOTE
89 yoF with SBO now s/p ex lap and lysis of adhesions on 5/20. Progressing appropriately, awaiting full return of bowel function.    - Advance diet as tolerated  - PO/IV multimodal pain regimen  - Activity as tolerated  - PT, recommend frequent ambulation and up to chair  - Ok to leave incision open to air

## 2024-05-23 NOTE — PROGRESS NOTES
[facility]  General Surgery  Progress Note    Subjective:     History of Present Illness:  No notes on file    Post-Op Info:  Procedure(s) (LRB):  LAPAROTOMY, EXPLORATORY (N/A)   3 Days Post-Op     Interval History: NAEON. AVSS. No N/V. Passing flatus. No BM. Incision healing well with staples intact. Lab work stable.    Medications:  Continuous Infusions:  Scheduled Meds:   acetaminophen  1,000 mg Oral Q8H    mupirocin   Nasal BID    piperacillin-tazobactam (Zosyn) IV (PEDS and ADULTS) (extended infusion is not appropriate)  4.5 g Intravenous Q8H     PRN Meds:  Current Facility-Administered Medications:     dextrose 10%, 12.5 g, Intravenous, PRN    dextrose 10%, 25 g, Intravenous, PRN    glucagon (human recombinant), 1 mg, Intramuscular, PRN    glucose, 16 g, Oral, PRN    glucose, 24 g, Oral, PRN    HYDROmorphone, 0.2 mg, Intravenous, Q6H PRN    HYDROmorphone, 0.5 mg, Intravenous, Q4H PRN    naloxone, 0.02 mg, Intravenous, PRN    ondansetron, 4 mg, Intravenous, Q6H PRN    oxyCODONE, 10 mg, Oral, Q6H PRN    oxyCODONE, 5 mg, Oral, Q6H PRN    prochlorperazine, 5 mg, Intramuscular, Q6H PRN    sodium chloride 0.9%, 10 mL, Intravenous, PRN    sodium chloride 0.9%, 10 mL, Intravenous, Q12H PRN     Review of patient's allergies indicates:   Allergen Reactions    Iodine and iodide containing products      Objective:     Vital Signs (Most Recent):  Temp: 98.4 °F (36.9 °C) (05/23/24 0712)  Pulse: 61 (05/23/24 0712)  Resp: 18 (05/23/24 0712)  BP: (!) 177/72 (05/23/24 0712)  SpO2: 98 % (05/23/24 0712) Vital Signs (24h Range):  Temp:  [96.8 °F (36 °C)-98.4 °F (36.9 °C)] 98.4 °F (36.9 °C)  Pulse:  [60-76] 61  Resp:  [16-20] 18  SpO2:  [97 %-98 %] 98 %  BP: (135-177)/(62-72) 177/72     Weight: 55.6 kg (122 lb 9.2 oz)  Body mass index is 25.62 kg/m².    Intake/Output - Last 3 Shifts         05/21 0700  05/22 0659 05/22 0700 05/23 0659 05/23 0700  05/24 0659    P.O. 200 120     I.V. (mL/kg) 1502.5 (30.7) 819.2 (14.7)     IV  Piggyback 199.9 225.1     Total Intake(mL/kg) 1902.4 (38.8) 1164.2 (20.9)     Urine (mL/kg/hr) 1235 (1.1) 230 (0.2)     Drains       Total Output 1235 230     Net +667.4 +934.2            Urine Occurrence  1 x              Physical Exam  Vitals reviewed.   Constitutional:       Appearance: Normal appearance.   Cardiovascular:      Rate and Rhythm: Normal rate.      Pulses: Normal pulses.   Pulmonary:      Effort: Pulmonary effort is normal.   Abdominal:      General: There is no distension.      Palpations: Abdomen is soft. There is no mass.      Tenderness: There is no guarding.      Comments: Incision healing well with staples intact   Skin:     General: Skin is warm and dry.   Neurological:      General: No focal deficit present.      Mental Status: She is alert and oriented to person, place, and time.          Significant Labs:  I have reviewed all pertinent lab results within the past 24 hours.  CBC:   Recent Labs   Lab 05/23/24  0503   WBC 8.96   RBC 4.24   HGB 12.5   HCT 38.6      MCV 91   MCH 29.5   MCHC 32.4     CMP:   Recent Labs   Lab 05/20/24  0849 05/20/24  1807 05/23/24  0503   *   < > 67*   CALCIUM 10.3   < > 8.1*   ALBUMIN 3.2*  --   --    PROT 6.7  --   --       < > 136   K 4.4   < > 4.1   CO2 25   < > 15*   CL 96   < > 110   BUN 74*   < > 25*   CREATININE 1.7*   < > 0.8   ALKPHOS 58  --   --    ALT 22  --   --    AST 24  --   --    BILITOT 0.5  --   --     < > = values in this interval not displayed.       Significant Diagnostics:  I have reviewed all pertinent imaging results/findings within the past 24 hours.  Assessment/Plan:     * SBO (small bowel obstruction)  89 yoF with SBO now s/p ex lap and lysis of adhesions on 5/20. Progressing appropriately, awaiting full return of bowel function.    - Advance diet as tolerated  - PO/IV multimodal pain regimen  - Activity as tolerated  - PT, recommend frequent ambulation and up to chair  - Ok to leave incision open to  air Luca Lagunas MD  General Surgery  [facility]

## 2024-05-23 NOTE — PLAN OF CARE
Assisted up to bsc to void,took meds crushed in pudding,no current c/o n/v,ML abd dressing cdi,bed alarm set.

## 2024-05-23 NOTE — PROGRESS NOTES
[facility] Medicine  Progress Note    Patient Name: Alexandra Gleason  MRN: [id number]  Patient Class: IP- Inpatient   Admission Date: 5/20/2024  Length of Stay: 3 days  Attending Physician: Marcelino Reed MD  Primary Care Provider: Carla Butler MD        Subjective:     Principal Problem:SBO (small bowel obstruction)        HPI:  89 years old woman with past medical history of hypertension, hyperlipidemia, coronary artery disease, hysterectomy, cholecystectomy, and appendicectomy.  The patient is very functional at baseline.  She presented to outside facility for abdominal pain, constipation and she was found to have a small-bowel obstruction evident by CT.  The patient failed conservative therapy and she was referred to our hospital for surgical intervention.      Overview/Hospital Course:  No notes on file    Interval History:  Patient was examined in her chair, with family by her side.  She was calm did not appear to be in distress.  She denies chest pain, palpitation, shortness and breath,  symptoms but complains of abdominal discomfort with palpation.  She states her abdominal pain has improved since yesterday but is still there.  She denies passing gas no bowel movement today.    Review of Systems   Constitutional:  Negative for activity change, chills and fever.   HENT:  Negative for congestion, rhinorrhea and sore throat.    Eyes:  Negative for discharge and redness.   Respiratory:  Negative for cough, chest tightness, shortness of breath and wheezing.    Cardiovascular:  Negative for chest pain, palpitations and leg swelling.   Gastrointestinal:  Positive for abdominal pain. Negative for constipation, diarrhea, nausea and vomiting.   Genitourinary:  Negative for dysuria, flank pain and hematuria.   Musculoskeletal:  Negative for back pain, myalgias and neck pain.   Skin:  Negative for pallor, rash and wound.   Neurological:  Negative for dizziness, tremors, syncope, weakness, numbness  and headaches.   Psychiatric/Behavioral:  Negative for agitation, confusion and hallucinations.    All other systems reviewed and are negative.    Objective:     Vital Signs (Most Recent):  Temp: 97.9 °F (36.6 °C) (05/23/24 1112)  Pulse: 72 (05/23/24 1221)  Resp: 18 (05/23/24 1112)  BP: (!) 142/69 (05/23/24 1112)  SpO2: 98 % (05/23/24 1112) Vital Signs (24h Range):  Temp:  [96.8 °F (36 °C)-98.4 °F (36.9 °C)] 97.9 °F (36.6 °C)  Pulse:  [60-76] 72  Resp:  [18-20] 18  SpO2:  [97 %-98 %] 98 %  BP: (135-177)/(62-72) 142/69     Weight: 55.6 kg (122 lb 9.2 oz)  Body mass index is 25.62 kg/m².    Intake/Output Summary (Last 24 hours) at 5/23/2024 1443  Last data filed at 5/23/2024 0608  Gross per 24 hour   Intake 1104.2 ml   Output 230 ml   Net 874.2 ml         Physical Exam  Vitals and nursing note reviewed.   Constitutional:       General: She is not in acute distress.  HENT:      Head: Normocephalic and atraumatic.      Mouth/Throat:      Mouth: Mucous membranes are moist.   Eyes:      General:         Right eye: No discharge.         Left eye: No discharge.      Conjunctiva/sclera: Conjunctivae normal.   Cardiovascular:      Rate and Rhythm: Normal rate and regular rhythm.      Pulses: Normal pulses.   Pulmonary:      Effort: Pulmonary effort is normal.      Breath sounds: Normal breath sounds.   Abdominal:      General: Bowel sounds are normal.      Palpations: Abdomen is soft.      Tenderness: There is abdominal tenderness.      Comments: Tenderness at surgery site   Musculoskeletal:         General: No swelling. Normal range of motion.      Cervical back: Normal range of motion and neck supple.   Skin:     General: Skin is warm and dry.   Neurological:      Mental Status: She is alert and oriented to person, place, and time. Mental status is at baseline.   Psychiatric:         Mood and Affect: Mood normal.             Significant Labs: All pertinent labs within the past 24 hours have been reviewed.    Significant  Imaging: I have reviewed all pertinent imaging results/findings within the past 24 hours.    Assessment/Plan:      * SBO (small bowel obstruction)    Continue intermittent suction with NG tube.  IV hydration.  Continue Zosyn.  Surgery consult. POD#3 exploratory laparotomy  -upgraded diet to full liquid  -denies passing gas no bowel movement today.  Surgeon aware  -OOB    Hyperkalemia  This patient has hyperkalemia which is uncontrolled. We will monitor for arrhythmias with EKG or continuous telemetry. We will treat the hyperkalemia with Potassium Binders and repeat labs . The likely etiology of the hyperkalemia is  patient was on potassium chloride IV .  The patients latest potassium has been reviewed and the results are listed below  Recent Labs   Lab 05/23/24  0503   K 4.1       -stop potassium chloride IV  -repeat labs  -continue with normal saline          ALEXEY (acute kidney injury)  Likely prerenal  Avoid nephrotoxin  IV hydration as above  Renal function improving    Coronary artery disease involving native coronary artery of native heart without angina pectoris  No acute issues  Continue to monitor    HTN (hypertension)  -restart lisinopril and statins  -monitor vitals      VTE Risk Mitigation (From admission, onward)           Ordered     Place sequential compression device  Until discontinued         05/20/24 1734     IP VTE HIGH RISK PATIENT  Once         05/20/24 1734     Reason for No Pharmacological VTE Prophylaxis  Once        Question:  Reasons:  Answer:  Physician Provided (leave comment)  Comment:  pending surgery    05/20/24 1734                    Discharge Planning   AMADO:      Code Status: Full Code   Is the patient medically ready for discharge?:     Reason for patient still in hospital (select all that apply): Patient trending condition and Consult recommendations  Discharge Plan A: Home Health                  Marcelino Reed MD  Department of Hospital Medicine   [facility] Surg

## 2024-05-23 NOTE — SUBJECTIVE & OBJECTIVE
Interval History: NAEON. AVSS. No N/V. Passing flatus. No BM. Incision healing well with staples intact. Lab work stable.    Medications:  Continuous Infusions:  Scheduled Meds:   acetaminophen  1,000 mg Oral Q8H    mupirocin   Nasal BID    piperacillin-tazobactam (Zosyn) IV (PEDS and ADULTS) (extended infusion is not appropriate)  4.5 g Intravenous Q8H     PRN Meds:  Current Facility-Administered Medications:     dextrose 10%, 12.5 g, Intravenous, PRN    dextrose 10%, 25 g, Intravenous, PRN    glucagon (human recombinant), 1 mg, Intramuscular, PRN    glucose, 16 g, Oral, PRN    glucose, 24 g, Oral, PRN    HYDROmorphone, 0.2 mg, Intravenous, Q6H PRN    HYDROmorphone, 0.5 mg, Intravenous, Q4H PRN    naloxone, 0.02 mg, Intravenous, PRN    ondansetron, 4 mg, Intravenous, Q6H PRN    oxyCODONE, 10 mg, Oral, Q6H PRN    oxyCODONE, 5 mg, Oral, Q6H PRN    prochlorperazine, 5 mg, Intramuscular, Q6H PRN    sodium chloride 0.9%, 10 mL, Intravenous, PRN    sodium chloride 0.9%, 10 mL, Intravenous, Q12H PRN     Review of patient's allergies indicates:   Allergen Reactions    Iodine and iodide containing products      Objective:     Vital Signs (Most Recent):  Temp: 98.4 °F (36.9 °C) (05/23/24 0712)  Pulse: 61 (05/23/24 0712)  Resp: 18 (05/23/24 0712)  BP: (!) 177/72 (05/23/24 0712)  SpO2: 98 % (05/23/24 0712) Vital Signs (24h Range):  Temp:  [96.8 °F (36 °C)-98.4 °F (36.9 °C)] 98.4 °F (36.9 °C)  Pulse:  [60-76] 61  Resp:  [16-20] 18  SpO2:  [97 %-98 %] 98 %  BP: (135-177)/(62-72) 177/72     Weight: 55.6 kg (122 lb 9.2 oz)  Body mass index is 25.62 kg/m².    Intake/Output - Last 3 Shifts         05/21 0700 05/22 0659 05/22 0700 05/23 0659 05/23 0700 05/24 0659    P.O. 200 120     I.V. (mL/kg) 1502.5 (30.7) 819.2 (14.7)     IV Piggyback 199.9 225.1     Total Intake(mL/kg) 1902.4 (38.8) 1164.2 (20.9)     Urine (mL/kg/hr) 1235 (1.1) 230 (0.2)     Drains       Total Output 1235 230     Net +667.4 +934.2            Urine Occurrence   1 x              Physical Exam  Vitals reviewed.   Constitutional:       Appearance: Normal appearance.   Cardiovascular:      Rate and Rhythm: Normal rate.      Pulses: Normal pulses.   Pulmonary:      Effort: Pulmonary effort is normal.   Abdominal:      General: There is no distension.      Palpations: Abdomen is soft. There is no mass.      Tenderness: There is no guarding.      Comments: Incision healing well with staples intact   Skin:     General: Skin is warm and dry.   Neurological:      General: No focal deficit present.      Mental Status: She is alert and oriented to person, place, and time.          Significant Labs:  I have reviewed all pertinent lab results within the past 24 hours.  CBC:   Recent Labs   Lab 05/23/24  0503   WBC 8.96   RBC 4.24   HGB 12.5   HCT 38.6      MCV 91   MCH 29.5   MCHC 32.4     CMP:   Recent Labs   Lab 05/20/24  0849 05/20/24  1807 05/23/24  0503   *   < > 67*   CALCIUM 10.3   < > 8.1*   ALBUMIN 3.2*  --   --    PROT 6.7  --   --       < > 136   K 4.4   < > 4.1   CO2 25   < > 15*   CL 96   < > 110   BUN 74*   < > 25*   CREATININE 1.7*   < > 0.8   ALKPHOS 58  --   --    ALT 22  --   --    AST 24  --   --    BILITOT 0.5  --   --     < > = values in this interval not displayed.       Significant Diagnostics:  I have reviewed all pertinent imaging results/findings within the past 24 hours.

## 2024-05-24 PROBLEM — R53.1 WEAKNESS: Status: ACTIVE | Noted: 2024-05-24

## 2024-05-24 PROBLEM — K56.609 SMALL BOWEL OBSTRUCTION: Status: ACTIVE | Noted: 2024-05-24

## 2024-05-24 LAB
ANION GAP SERPL CALC-SCNC: 9 MMOL/L (ref 8–16)
BUN SERPL-MCNC: 17 MG/DL (ref 8–23)
CALCIUM SERPL-MCNC: 8.5 MG/DL (ref 8.7–10.5)
CHLORIDE SERPL-SCNC: 106 MMOL/L (ref 95–110)
CO2 SERPL-SCNC: 21 MMOL/L (ref 23–29)
CREAT SERPL-MCNC: 0.8 MG/DL (ref 0.5–1.4)
ERYTHROCYTE [DISTWIDTH] IN BLOOD BY AUTOMATED COUNT: 13.2 % (ref 11.5–14.5)
EST. GFR  (NO RACE VARIABLE): >60 ML/MIN/1.73 M^2
GLUCOSE SERPL-MCNC: 79 MG/DL (ref 70–110)
HCT VFR BLD AUTO: 40.7 % (ref 37–48.5)
HGB BLD-MCNC: 13.3 G/DL (ref 12–16)
MCH RBC QN AUTO: 29.6 PG (ref 27–31)
MCHC RBC AUTO-ENTMCNC: 32.7 G/DL (ref 32–36)
MCV RBC AUTO: 91 FL (ref 82–98)
PLATELET # BLD AUTO: 209 K/UL (ref 150–450)
PMV BLD AUTO: 10.9 FL (ref 9.2–12.9)
POTASSIUM SERPL-SCNC: 3.9 MMOL/L (ref 3.5–5.1)
RBC # BLD AUTO: 4.49 M/UL (ref 4–5.4)
SODIUM SERPL-SCNC: 136 MMOL/L (ref 136–145)
WBC # BLD AUTO: 12.33 K/UL (ref 3.9–12.7)

## 2024-05-24 PROCEDURE — 21400001 HC TELEMETRY ROOM: Mod: HCNC

## 2024-05-24 PROCEDURE — 36415 COLL VENOUS BLD VENIPUNCTURE: CPT | Mod: HCNC | Performed by: INTERNAL MEDICINE

## 2024-05-24 PROCEDURE — 63600175 PHARM REV CODE 636 W HCPCS: Mod: HCNC | Performed by: INTERNAL MEDICINE

## 2024-05-24 PROCEDURE — 85027 COMPLETE CBC AUTOMATED: CPT | Mod: HCNC | Performed by: INTERNAL MEDICINE

## 2024-05-24 PROCEDURE — 25000003 PHARM REV CODE 250: Mod: HCNC

## 2024-05-24 PROCEDURE — 97110 THERAPEUTIC EXERCISES: CPT | Mod: HCNC,CQ

## 2024-05-24 PROCEDURE — 80048 BASIC METABOLIC PNL TOTAL CA: CPT | Mod: HCNC | Performed by: INTERNAL MEDICINE

## 2024-05-24 PROCEDURE — 97116 GAIT TRAINING THERAPY: CPT | Mod: HCNC,CQ

## 2024-05-24 PROCEDURE — 25000003 PHARM REV CODE 250: Mod: HCNC | Performed by: INTERNAL MEDICINE

## 2024-05-24 RX ADMIN — ATORVASTATIN CALCIUM 20 MG: 20 TABLET, FILM COATED ORAL at 09:05

## 2024-05-24 RX ADMIN — LISINOPRIL 10 MG: 10 TABLET ORAL at 09:05

## 2024-05-24 RX ADMIN — MUPIROCIN: 20 OINTMENT TOPICAL at 09:05

## 2024-05-24 RX ADMIN — PIPERACILLIN SODIUM AND TAZOBACTAM SODIUM 4.5 G: 4; .5 INJECTION, POWDER, LYOPHILIZED, FOR SOLUTION INTRAVENOUS at 03:05

## 2024-05-24 RX ADMIN — MUPIROCIN: 20 OINTMENT TOPICAL at 10:05

## 2024-05-24 RX ADMIN — PIPERACILLIN SODIUM AND TAZOBACTAM SODIUM 4.5 G: 4; .5 INJECTION, POWDER, LYOPHILIZED, FOR SOLUTION INTRAVENOUS at 10:05

## 2024-05-24 RX ADMIN — PIPERACILLIN SODIUM AND TAZOBACTAM SODIUM 4.5 G: 4; .5 INJECTION, POWDER, LYOPHILIZED, FOR SOLUTION INTRAVENOUS at 06:05

## 2024-05-24 NOTE — PLAN OF CARE
POC and safety reviewed,stated understanding,staples intact,had BM,c/o gas,order recd for prn,bed alarm set.

## 2024-05-24 NOTE — SUBJECTIVE & OBJECTIVE
Interval History:  Patient was examined in her bed with family by her side.  She was calm and not in distress.  She denies chest pain, palpitation, shortness and breath, GI/ symptoms.  She states she has been passing gas and had a bowel movement earlier.  She will be discharged tomorrow as family is making adjustments at home in preparation of her arrival.    Review of Systems   Constitutional:  Negative for activity change, chills and fever.   HENT:  Negative for congestion, rhinorrhea and sore throat.    Eyes:  Negative for discharge and redness.   Respiratory:  Negative for cough, chest tightness, shortness of breath and wheezing.    Cardiovascular:  Negative for chest pain, palpitations and leg swelling.   Gastrointestinal:  Negative for abdominal pain, constipation, diarrhea, nausea and vomiting.   Genitourinary:  Negative for dysuria, flank pain and hematuria.   Musculoskeletal:  Negative for back pain, myalgias and neck pain.   Skin:  Negative for pallor, rash and wound.   Neurological:  Negative for dizziness, tremors, syncope, weakness, numbness and headaches.   Psychiatric/Behavioral:  Negative for agitation, confusion and hallucinations.    All other systems reviewed and are negative.    Objective:     Vital Signs (Most Recent):  Temp: 97.6 °F (36.4 °C) (05/24/24 1139)  Pulse: 97 (05/24/24 1139)  Resp: 18 (05/24/24 1139)  BP: 119/61 (05/24/24 1139)  SpO2: 95 % (05/24/24 1139) Vital Signs (24h Range):  Temp:  [97.6 °F (36.4 °C)-98 °F (36.7 °C)] 97.6 °F (36.4 °C)  Pulse:  [62-97] 97  Resp:  [16-20] 18  SpO2:  [95 %-99 %] 95 %  BP: (119-141)/(58-64) 119/61     Weight: 53.7 kg (118 lb 6.2 oz)  Body mass index is 24.74 kg/m².    Intake/Output Summary (Last 24 hours) at 5/24/2024 1526  Last data filed at 5/24/2024 0830  Gross per 24 hour   Intake 404.15 ml   Output 651 ml   Net -246.85 ml         Physical Exam  Vitals reviewed.   Constitutional:       Appearance: Normal appearance.   Cardiovascular:      Rate  and Rhythm: Normal rate.      Pulses: Normal pulses.   Pulmonary:      Effort: Pulmonary effort is normal.   Abdominal:      General: There is no distension.      Palpations: Abdomen is soft. There is no mass.      Tenderness: There is no guarding.      Comments: Incision healing well with staples intact   Skin:     General: Skin is warm and dry.   Neurological:      General: No focal deficit present.      Mental Status: She is alert and oriented to person, place, and time.             Significant Labs: All pertinent labs within the past 24 hours have been reviewed.    Significant Imaging: I have reviewed all pertinent imaging results/findings within the past 24 hours.

## 2024-05-24 NOTE — ASSESSMENT & PLAN NOTE
"This patient does have evidence of infective focus  My overall impression is sepsis.  Source: Unknown  Antibiotics given-   Antibiotics (72h ago, onward)    None        Latest lactate reviewed-  No results for input(s): "LACTATE", "POCLAC" in the last 72 hours.  Organ dysfunction indicated by Acute kidney injury    Fluid challenge Will give IV fluids maintenance.MAP greater than 65. Lactic acid, procal  CXR, U/A and blood cultures pending     Post- resuscitation assessment No - Post resuscitation assessment not needed       Will Not start Pressors- Levophed for MAP of 65  Source control achieved by: IV zosyn   "

## 2024-05-24 NOTE — PLAN OF CARE
Problem: Physical Therapy  Goal: Physical Therapy Goal  Description: Goals to be met by: 24     Patient will increase functional independence with mobility by performin. Supine to sit with Stand-by Assistance  2. Sit to supine with Stand-by Assistance  3. Sit to stand transfer with Modified [place] with use of LRAD.   4. Bed to chair transfer with Modified [place] using LRAD.   5. Gait  x 150 feet with Modified [place] using LRAD.     Outcome: Progressing       Pt ambulated ~220ft using RW and CGA/SBA

## 2024-05-24 NOTE — PT/OT/SLP PROGRESS
Physical Therapy Treatment    Patient Name:  Alexandra Gleason   MRN:  [id number]    Recommendations:     Discharge Recommendations: Low Intensity Therapy (HH PT with increased family assistance)  Discharge Equipment Recommendations: walker, rolling  Barriers to discharge: None    Assessment:     Alexandra Gleason is a 89 y.o. female admitted with a medical diagnosis of SBO (small bowel obstruction).  She presents with the following impairments/functional limitations: weakness, impaired endurance, impaired self care skills, impaired functional mobility, gait instability, impaired balance, decreased lower extremity function, pain.    Pt ambulated ~220ft using RW and CGA/SBA    Rehab Prognosis: Good; patient would benefit from acute skilled PT services to address these deficits and reach maximum level of function.    Recent Surgery: Procedure(s) (LRB):  LAPAROTOMY, EXPLORATORY (N/A) 4 Days Post-Op    Plan:     During this hospitalization, patient to be seen 5 x/week to address the identified rehab impairments via gait training, therapeutic activities, therapeutic exercises, neuromuscular re-education and progress toward the following goals:    Plan of Care Expires:  06/22/24    Subjective     Chief Complaint: ready to go home  Patient/Family Comments/goals: Pt agreed to therapy  Pain/Comfort:  Pain Rating 1: 0/10      Objective:     Communicated with nursing prior to session.  Patient found HOB elevated with bed alarm, peripheral IV, telemetry upon PT entry to room.     General Precautions: Standard, fall  Orthopedic Precautions: N/A  Braces: N/A  Respiratory Status: Room air     Functional Mobility:  Bed Mobility:     Rolling Right: stand by assistance  Scooting: contact guard assistance  Supine to Sit: contact guard assistance  Sit to Supine: minimum assistance  Transfers: Gait belt donned     Sit to Stand:  contact guard assistance with rolling walker  Gait: Pt ambulated ~220ft using RW and CGA/SBA. Pt with  decreased abe, step length, flex trunk. Pt with required verbal cueing for RW management, proper sequencing, and upright posture. Pt became dizzy towards end of session, requiring PLB technique to promote relaxation, pt able to demo back to therapist.  Balance: Pt with Fair standing balance      AM-PAC 6 CLICK MOBILITY  Turning over in bed (including adjusting bedclothes, sheets and blankets)?: 3  Sitting down on and standing up from a chair with arms (e.g., wheelchair, bedside commode, etc.): 3  Moving from lying on back to sitting on the side of the bed?: 3  Moving to and from a bed to a chair (including a wheelchair)?: 3  Need to walk in hospital room?: 3  Climbing 3-5 steps with a railing?: 2  Basic Mobility Total Score: 17       Treatment & Education:  Pt instructed to perform seated LAQ, HR, hip flexion 2x10 each    Pt encouraged to sit up in the chair for all meals and to call nursing prior to getting in chair for assistance, pt verbalized understanding    Patient left HOB elevated with all lines intact, call button in reach, bed alarm on, and daughter present..    GOALS:   Multidisciplinary Problems       Physical Therapy Goals          Problem: Physical Therapy    Goal Priority Disciplines Outcome Goal Variances Interventions   Physical Therapy Goal     PT, PT/OT Progressing     Description: Goals to be met by: 24     Patient will increase functional independence with mobility by performin. Supine to sit with Stand-by Assistance  2. Sit to supine with Stand-by Assistance  3. Sit to stand transfer with Modified [place] with use of LRAD.   4. Bed to chair transfer with Modified [place] using LRAD.   5. Gait  x 150 feet with Modified [place] using LRAD.                          Time Tracking:     PT Received On: 24  PT Start Time: 935     PT Stop Time: 958  PT Total Time (min): 23 min     Billable Minutes: Gait Training 15 and Therapeutic Exercise 8    Treatment Type:  Treatment  PT/PTA: PTA     Number of PTA visits since last PT visit: 2     05/24/2024

## 2024-05-24 NOTE — PLAN OF CARE
Patient is AAOx4. Tolerated regular diet, poor appetite, encouraged to drink nutritional drink. Turned with assistance. IV abx maintained. Heart monitoring continued. No complains of pain this shift. Instructed to use call light for assistance. Midline anterior abdominal incision CDI, staples intact. Bruising on mid back , mepilex changed. Bed alarm set and maintained at lowest position. SCDs on.  Call light within reach. Family member at bedside.

## 2024-05-24 NOTE — SUBJECTIVE & OBJECTIVE
Interval History: NAEON. AVSS. Tolerating FLD. Multiple BM. Incision healing well with staples intact. Lab work stable.    Medications:  Continuous Infusions:  Scheduled Meds:   acetaminophen  1,000 mg Oral Q8H    atorvastatin  20 mg Oral Daily    lisinopriL  10 mg Oral Daily    mupirocin   Nasal BID    piperacillin-tazobactam (Zosyn) IV (PEDS and ADULTS) (extended infusion is not appropriate)  4.5 g Intravenous Q8H     PRN Meds:  Current Facility-Administered Medications:     aluminum-magnesium hydroxide-simethicone, 30 mL, Oral, Q6H PRN    dextrose 10%, 12.5 g, Intravenous, PRN    dextrose 10%, 25 g, Intravenous, PRN    glucagon (human recombinant), 1 mg, Intramuscular, PRN    glucose, 16 g, Oral, PRN    glucose, 24 g, Oral, PRN    HYDROmorphone, 0.2 mg, Intravenous, Q6H PRN    HYDROmorphone, 0.5 mg, Intravenous, Q4H PRN    naloxone, 0.02 mg, Intravenous, PRN    ondansetron, 4 mg, Intravenous, Q6H PRN    oxyCODONE, 10 mg, Oral, Q6H PRN    oxyCODONE, 5 mg, Oral, Q6H PRN    prochlorperazine, 5 mg, Intramuscular, Q6H PRN    sodium chloride 0.9%, 10 mL, Intravenous, PRN    sodium chloride 0.9%, 10 mL, Intravenous, Q12H PRN     Review of patient's allergies indicates:   Allergen Reactions    Iodine and iodide containing products      Objective:     Vital Signs (Most Recent):  Temp: 97.7 °F (36.5 °C) (05/24/24 0732)  Pulse: 76 (05/24/24 0732)  Resp: 20 (05/24/24 0732)  BP: (!) 141/64 (05/24/24 0732)  SpO2: 97 % (05/24/24 0732) Vital Signs (24h Range):  Temp:  [97.5 °F (36.4 °C)-98 °F (36.7 °C)] 97.7 °F (36.5 °C)  Pulse:  [62-84] 76  Resp:  [16-20] 20  SpO2:  [95 %-99 %] 97 %  BP: (121-149)/(58-69) 141/64     Weight: 53.7 kg (118 lb 6.2 oz)  Body mass index is 24.74 kg/m².    Intake/Output - Last 3 Shifts         05/22 0700 05/23 0659 05/23 0700 05/24 0659 05/24 0700 05/25 0659    P.O. 120 120     I.V. (mL/kg) 819.2 (14.7)      IV Piggyback 225.1 284.2     Total Intake(mL/kg) 1164.2 (20.9) 404.2 (7.5)     Urine  (mL/kg/hr) 230 (0.2) 900 (0.7)     Stool  0     Total Output 230 900     Net +934.2 -495.9            Urine Occurrence 1 x      Stool Occurrence  4 x              Physical Exam  Vitals reviewed.   Constitutional:       Appearance: Normal appearance.   Cardiovascular:      Rate and Rhythm: Normal rate.      Pulses: Normal pulses.   Pulmonary:      Effort: Pulmonary effort is normal.   Abdominal:      General: There is no distension.      Palpations: Abdomen is soft. There is no mass.      Tenderness: There is no guarding.      Comments: Incision healing well with staples intact   Skin:     General: Skin is warm and dry.   Neurological:      General: No focal deficit present.      Mental Status: She is alert and oriented to person, place, and time.          Significant Labs:  I have reviewed all pertinent lab results within the past 24 hours.  CBC:   Recent Labs   Lab 05/24/24  0534   WBC 12.33   RBC 4.49   HGB 13.3   HCT 40.7      MCV 91   MCH 29.6   MCHC 32.7     CMP:   Recent Labs   Lab 05/20/24  0849 05/20/24  1807 05/24/24  0534   *   < > 79   CALCIUM 10.3   < > 8.5*   ALBUMIN 3.2*  --   --    PROT 6.7  --   --       < > 136   K 4.4   < > 3.9   CO2 25   < > 21*   CL 96   < > 106   BUN 74*   < > 17   CREATININE 1.7*   < > 0.8   ALKPHOS 58  --   --    ALT 22  --   --    AST 24  --   --    BILITOT 0.5  --   --     < > = values in this interval not displayed.       Significant Diagnostics:  I have reviewed all pertinent imaging results/findings within the past 24 hours.

## 2024-05-24 NOTE — PLAN OF CARE
The sw spoke to the pt's dtr Carlos [phone] via phone who states they're agreeable to hh and a rolling walker for the pt. The pt was accepted by [facility] via [employer] and the sw notified her. She's in agreement with the d/c plan. They were against hh and dme earlier but are now agreeable to them. The sw faxed the pt's hh orders to them and notified them the pt will d/c today or tomorrow. The sw asked Dr. Reed to write the script for the rw along with the justification via secure chat.    3:34pm The sw met with the pt and her dtr in law. The pt signed the Pt Choice form and is in agreement with receiving hh from [facility]. The sw placed the original in the chart and gave the pt a copy.     [facility] Phone: [phone] [address] [address] 5/22/2024 16:14 (CT)  5/24/2024 16:38 (CT)  -  5/22/2024 16:17 (CT)  5/24/2024 14:44 (CT)  Response: Yes, willing to accept patient  Comments: Thank you! Due to staffing capacity, We can do RN SOC Sunday 5/26 with PT/OT evals week of 5/28.              05/24/24 1871   Post-Acute Status   Post-Acute Authorization Home Health   Home Health Status Referrals Sent   Discharge Plan   Discharge Plan A Home Health   Discharge Plan B Other  (TBD)

## 2024-05-24 NOTE — ASSESSMENT & PLAN NOTE
This patient has hyperkalemia which is uncontrolled. We will monitor for arrhythmias with EKG or continuous telemetry. We will treat the hyperkalemia with Potassium Binders and repeat labs . The likely etiology of the hyperkalemia is  patient was on potassium chloride IV .  The patients latest potassium has been reviewed and the results are listed below  Recent Labs   Lab 05/24/24  0534   K 3.9       -stop potassium chloride IV  -repeat labs  -continue with normal saline

## 2024-05-24 NOTE — ASSESSMENT & PLAN NOTE
Creatine stable for now. BMP reviewed- noted Estimated Creatinine Clearance: 34.8 mL/min (based on SCr of 0.8 mg/dL). according to latest data. Based on current GFR, CKD stage is stage 3 - GFR 30-59.  Monitor UOP and serial BMP and adjust therapy as needed. Renally dose meds. Avoid nephrotoxic medications and procedures.

## 2024-05-24 NOTE — PROGRESS NOTES
[facility]      HOME HEALTH ORDERS  FACE TO FACE ENCOUNTER    Patient Name: Alexandra Gleason  YOB: 1934    PCP: Carla Butler MD   PCP Address[place] [facility] [place] [address]  PCP Phone Number: [phone]  PCP Fax: [phone]    Encounter Date: 5/20/24    Admit to Home Health    Diagnoses:  Active Hospital Problems    Diagnosis  POA    *SBO (small bowel obstruction) [K56.609]  Yes     Consult general surgery  NGT suction overnight  IVF's  Check labs and abd xray in the morning      Hyperkalemia [E87.5]  No    ALEXEY (acute kidney injury) [N17.9]  Unknown    Coronary artery disease involving native coronary artery of native heart without angina pectoris [I25.10]  Yes     Monitor for any chest pain  Hold ASA for now      HTN (hypertension) [I10]  Yes     Monitor BP  Hold lisinopril for now        Resolved Hospital Problems   No resolved problems to display.       Follow Up Appointments:  Future Appointments   Date Time Provider Department Center   6/3/2024 10:20 AM Meño Foster MD [facility]       Allergies:  Review of patient's allergies indicates:   Allergen Reactions    Iodine and iodide containing products        Medications: Review discharge medications with patient and family and provide education.    Current Facility-Administered Medications   Medication Dose Route Frequency Provider Last Rate Last Admin    acetaminophen tablet 1,000 mg  1,000 mg Oral Q8H Luca Lagunas MD   1,000 mg at 05/23/24 0623    aluminum-magnesium hydroxide-simethicone 200-200-20 mg/5 mL suspension 30 mL  30 mL Oral Q6H PRN Hawk Clark MD   30 mL at 05/23/24 2252    atorvastatin tablet 20 mg  20 mg Oral Daily Marcelino Reed MD   20 mg at 05/24/24 0907    dextrose 10% bolus 125 mL 125 mL  12.5 g Intravenous PRN Nando Valdez MD        dextrose 10% bolus 250 mL 250 mL  25 g Intravenous PRN Nando Valdez MD        glucagon (human recombinant) injection  1 mg  1 mg Intramuscular PRN Nando Valdez MD        glucose chewable tablet 16 g  16 g Oral PRN Nando Valdez MD        glucose chewable tablet 24 g  24 g Oral PRN Nando Valdez MD        HYDROmorphone injection 0.2 mg  0.2 mg Intravenous Q6H PRN Nando Valdez MD   0.2 mg at 05/22/24 0432    HYDROmorphone injection 0.5 mg  0.5 mg Intravenous Q4H PRN Luca Lagunas MD   0.5 mg at 05/21/24 1203    lisinopriL tablet 10 mg  10 mg Oral Daily Marcelino Reed MD   10 mg at 05/24/24 0907    mupirocin 2 % ointment   Nasal BID Marcelino Reed MD   Given at 05/24/24 0907    naloxone 0.4 mg/mL injection 0.02 mg  0.02 mg Intravenous PRN Nando Valdez MD        ondansetron injection 4 mg  4 mg Intravenous Q6H PRN Karma Olguin MD   4 mg at 05/23/24 0911    oxyCODONE immediate release tablet 10 mg  10 mg Oral Q6H PRN Luca Lagunas MD   10 mg at 05/23/24 2121    oxyCODONE immediate release tablet 5 mg  5 mg Oral Q6H PRN Luca Lagunas MD   5 mg at 05/23/24 0903    piperacillin-tazobactam (ZOSYN) 4.5 g in dextrose 5 % in water (D5W) 100 mL IVPB (MB+)  4.5 g Intravenous Q8H Marcelino Reed MD 25 mL/hr at 05/24/24 1045 4.5 g at 05/24/24 1045    prochlorperazine injection Soln 5 mg  5 mg Intramuscular Q6H PRN Jeremy Evans, NP   5 mg at 05/22/24 0823    sodium chloride 0.9% flush 10 mL  10 mL Intravenous PRN Nando Valdez MD        sodium chloride 0.9% flush 10 mL  10 mL Intravenous Q12H PRN Nando Valdez MD         Current Discharge Medication List        CONTINUE these medications which have NOT CHANGED    Details   atorvastatin (LIPITOR) 20 MG tablet Take 20 mg by mouth once daily.      lisinopriL 10 MG tablet lisinopriL 10 mg tablet, [RxNorm: 325536]      primidone (MYSOLINE) 50 MG Tab TAKE 2 TABLETS EVERY EVENING  Qty: 180 tablet, Refills: 3    Associated Diagnoses: Intention tremor               I have seen and examined  this patient within the last 30 days. My clinical findings that support the need for the home health skilled services and home bound status are the following:no   Weakness/numbness causing balance and gait disturbance due to Weakness/Debility making it taxing to leave home.     Diet:   2 gram sodium diet    Labs:  N/a    Referrals/ Consults  Physical Therapy to evaluate and treat. Evaluate for home safety and equipment needs; Establish/upgrade home exercise program. Perform / instruct on therapeutic exercises, gait training, transfer training, and Range of Motion.  Occupational Therapy to evaluate and treat. Evaluate home environment for safety and equipment needs. Perform/Instruct on transfers, ADL training, ROM, and therapeutic exercises.    Activities:   activity as tolerated    Nursing:   Agency to admit patient within 24 hours of hospital discharge unless specified on physician order or at patient request    SN to complete comprehensive assessment including routine vital signs. Instruct on disease process and s/s of complications to report to MD. Review/verify medication list sent home with the patient at time of discharge  and instruct patient/caregiver as needed. Frequency may be adjusted depending on start of care date.     Skilled nurse to perform up to 3 visits PRN for symptoms related to diagnosis    Notify MD if SBP > 160 or < 90; DBP > 90 or < 50; HR > 120 or < 50; Temp > 101; O2 < 88%;     Ok to schedule additional visits based on staff availability and patient request on consecutive days within the home health episode.    When multiple disciplines ordered:    Start of Care occurs on Sunday - Wednesday schedule remaining discipline evaluations as ordered on separate consecutive days following the start of care.    Thursday SOC -schedule subsequent evaluations Friday and Monday the following week.     Friday - Saturday SOC - schedule subsequent discipline evaluations on consecutive days starting Monday  of the following week.    For all post-discharge communication and subsequent orders please contact patient's primary care physician.     Miscellaneous   N/a    Home Health Aide:  Physical Therapy Three times weekly and Occupational Therapy Three times weekly    Wound Care Orders  yes:  Surgical Wound:  Location: abdomen    Consult ET nurse        Apply the following to wound:   Other: monitor (frequency)    I certify that this patient is confined to her home and needs physical therapy and occupational therapy.

## 2024-05-24 NOTE — PROGRESS NOTES
[facility] Medicine  Progress Note    Patient Name: Alexandra Gleason  MRN: [id number]  Patient Class: IP- Inpatient   Admission Date: 5/20/2024  Length of Stay: 4 days  Attending Physician: Marcelino Reed MD  Primary Care Provider: Carla Butler MD        Subjective:     Principal Problem:SBO (small bowel obstruction)        HPI:  89 years old woman with past medical history of hypertension, hyperlipidemia, coronary artery disease, hysterectomy, cholecystectomy, and appendicectomy.  The patient is very functional at baseline.  She presented to outside facility for abdominal pain, constipation and she was found to have a small-bowel obstruction evident by CT.  The patient failed conservative therapy and she was referred to our hospital for surgical intervention.      Overview/Hospital Course:  No notes on file    No new subjective & objective note has been filed under this hospital service since the last note was generated.      Assessment/Plan:      * SBO (small bowel obstruction)    Continue Zosyn.  Surgery consult. POD#4 exploratory laparotomy  -tolerating regular diet  -passing gas and had a bowel movement  -OOB    Weakness  -patient has been seen by PT/OT, home health recommended  -patient to be discharged home with rolling walker  -Justification for the rolling walker:  The mobility limitation cannot be sufficiently resolved by the use of a cane. Patient's functional mobility deficit can be sufficiently resolved with the use of a (Rolling Walker or Walker). Patient's mobility limitation significantly impairs their ability to participate in one of more activities of daily living. The use of a (RW or Walker) will significantly improve the patient's ability to participate in MRADLS and the patient will use it on regular basis in the home.       Hyperkalemia  This patient has hyperkalemia which is uncontrolled. We will monitor for arrhythmias with EKG or continuous telemetry. We will treat the  hyperkalemia with Potassium Binders and repeat labs . The likely etiology of the hyperkalemia is  patient was on potassium chloride IV .  The patients latest potassium has been reviewed and the results are listed below  Recent Labs   Lab 05/24/24  0534   K 3.9       -stop potassium chloride IV  -repeat labs  -continue with normal saline          ALEXEY (acute kidney injury)  Likely prerenal  Avoid nephrotoxin  S/p IV fluid  Resolved    Coronary artery disease involving native coronary artery of native heart without angina pectoris  No acute issues  Continue to monitor    HTN (hypertension)  -continue with lisinopril and statins  -monitor vitals      VTE Risk Mitigation (From admission, onward)           Ordered     Place sequential compression device  Until discontinued         05/20/24 1734     IP VTE HIGH RISK PATIENT  Once         05/20/24 1734     Reason for No Pharmacological VTE Prophylaxis  Once        Question:  Reasons:  Answer:  Physician Provided (leave comment)  Comment:  pending surgery    05/20/24 1734                    Discharge Planning   AMADO:      Code Status: Full Code   Is the patient medically ready for discharge?:     Reason for patient still in hospital (select all that apply): Patient trending condition and Pending disposition  Discharge Plan A: Home Health                  Marcelino Reed MD  Department of Hospital Medicine   [facility] Surg

## 2024-05-24 NOTE — DISCHARGE SUMMARY
[facility] Surg ([facility])  [facility] Medicine  Discharge Summary      Patient Name: Alexandra Gleason  MRN: [id number]  [place]AMONT: [id number]  Patient Class: IP- Inpatient  Admission Date: 5/17/2024  Hospital Length of Stay: 3 days  Discharge Date and Time: 5/20/2024  4:30 PM  Attending Physician: No att. providers found   Discharging Provider: Diana Evans PA-C  Primary Care Provider: Carla Butler MD    Primary Care Team: Networked reference to record PCT     HPI:   89 yo WF admitted for SBO.  She has a 3 day history of lower abd pain, bloating, diarrhea, nausea and vomiting.  Workup revealed SBO with transition zone on CT of Abd.  Being admitted for surgical consultation, NGT suction, IVF's, NPO.    * No surgery found *      Hospital Course:   Patient still is not passing gas or stool.  Abdominal x-ray looks unchanged.  NG tube still working well and decompressing her stomach.  Her pain is well controlled using Dilaudid and Zofran.  Surgery is following along.    5/20: PT HD stable on room air.  Small bowel follow through with continued small bowel obstruction. NG tube in place. General surgery recommending surgery.  Cardiology consulted for clearance.      Patient developing sepsis and will transfer to higher level of care for SBO.         Goals of Care Treatment Preferences:  Code Status: Full Code      Consults:     Cardiac/Vascular  Coronary artery disease involving native coronary artery of native heart without angina pectoris  Patient with known CAD, which is controlled Will continue ASA and monitor for S/Sx of angina/ACS. Continue to monitor on telemetry.   Monitor for any chest pain  Hold ASA for now    Hyperlipidemia  Hold Statin for now      HTN (hypertension)  Chronic, controlled. Latest blood pressure and vitals reviewed-     Temp:  [97.5 °F (36.4 °C)-98 °F (36.7 °C)]   Pulse:  [62-97]   Resp:  [16-20]   BP: (119-149)/(58-65)   SpO2:  [95 %-99 %] .   Home meds for hypertension were reviewed and  "noted below.   Hypertension Medications               lisinopriL 10 MG tablet lisinopriL 10 mg tablet, [RxNorm: 484765]            While in the hospital, will manage blood pressure as follows; Adjust home antihypertensive regimen as follows- hold lisinopril for now    Will utilize p.r.n. blood pressure medication only if patient's blood pressure greater than 180/110 and she develops symptoms such as worsening chest pain or shortness of breath.    Renal/  Stage 3a chronic kidney disease  Creatine stable for now. BMP reviewed- noted Estimated Creatinine Clearance: 34.8 mL/min (based on SCr of 0.8 mg/dL). according to latest data. Based on current GFR, CKD stage is stage 3 - GFR 30-59.  Monitor UOP and serial BMP and adjust therapy as needed. Renally dose meds. Avoid nephrotoxic medications and procedures.        ID  Sepsis  This patient does have evidence of infective focus  My overall impression is sepsis.  Source: Unknown  Antibiotics given-   Antibiotics (72h ago, onward)      None          Latest lactate reviewed-  No results for input(s): "LACTATE", "POCLAC" in the last 72 hours.  Organ dysfunction indicated by Acute kidney injury    Fluid challenge Will give IV fluids maintenance.MAP greater than 65. Lactic acid, procal  CXR, U/A and blood cultures pending     Post- resuscitation assessment No - Post resuscitation assessment not needed       Will Not start Pressors- Levophed for MAP of 65  Source control achieved by: IV zosyn     GI  * SBO (small bowel obstruction)  Consult general surgery  NGT suction overnight  IVF's  Check labs and abd xray in the morning  Start Clinimix at 100 cc/hour  Check small bowel series    5/20 Continued small bowel obstruction on small bowel series.  Due to age, code status, hypotension, leukocytosis general surgery transfer to higher level of care.             Irritable bowel syndrome with diarrhea  Hold cholestyramine for now.          Other  Anxiety  She takes Zoloft 25 mg " daily.  Will hold for now.        Final Active Diagnoses:    Diagnosis Date Noted POA    PRINCIPAL PROBLEM:  SBO (small bowel obstruction) [K56.609] 05/17/2024 Yes    Sepsis [A41.9] 05/20/2024 No    Irritable bowel syndrome with diarrhea [K58.0] 12/13/2022 Yes    Stage 3a chronic kidney disease [N18.31] 05/18/2021 Yes    Anxiety [F41.9] 04/05/2019 Yes    Coronary artery disease involving native coronary artery of native heart without angina pectoris [I25.10] 04/05/2019 Yes    HTN (hypertension) [I10] 08/12/2018 Yes    Hyperlipidemia [E78.5] 08/12/2018 Yes      Problems Resolved During this Admission:       Discharged Condition: fair    Disposition: Short Term Hospital    Follow Up:    Patient Instructions:   No discharge procedures on file.    Significant Diagnostic Studies: see A&P     Pending Diagnostic Studies:       None           Medications:  Reconciled Home Medications:      Medication List        ASK your doctor about these medications      atorvastatin 20 MG tablet  Commonly known as: LIPITOR  Take 20 mg by mouth once daily.     lisinopriL 10 MG tablet  lisinopriL 10 mg tablet, [RxNorm: 982256]     primidone 50 MG Tab  Commonly known as: MYSOLINE  TAKE 2 TABLETS EVERY EVENING              Indwelling Lines/Drains at time of discharge:   Lines/Drains/Airways       None                   Time spent on the discharge of patient: 25 minutes         Diana Evans PA-C  Department of Hospital Medicine  [facility] - Med Surg (3rd Fl)

## 2024-05-24 NOTE — ASSESSMENT & PLAN NOTE
89 yoF with SBO now s/p ex lap and lysis of adhesions on 5/20. Progressing appropriately, now with full return of bowel function.    - Advance to regular diet  - If tolerates, ok to discharge today from a surgical perspective  - Activity as tolerated  - Ok to leave incision open to air  - We will see her back in clinic in 2 weeks for staple removal and postop check

## 2024-05-24 NOTE — ASSESSMENT & PLAN NOTE
Chronic, controlled. Latest blood pressure and vitals reviewed-     Temp:  [97.5 °F (36.4 °C)-98 °F (36.7 °C)]   Pulse:  [62-97]   Resp:  [16-20]   BP: (119-149)/(58-65)   SpO2:  [95 %-99 %] .   Home meds for hypertension were reviewed and noted below.   Hypertension Medications               lisinopriL 10 MG tablet lisinopriL 10 mg tablet, [RxNorm: 034899]            While in the hospital, will manage blood pressure as follows; Adjust home antihypertensive regimen as follows- hold lisinopril for now    Will utilize p.r.n. blood pressure medication only if patient's blood pressure greater than 180/110 and she develops symptoms such as worsening chest pain or shortness of breath.

## 2024-05-24 NOTE — ASSESSMENT & PLAN NOTE
Consult general surgery  NGT suction overnight  IVF's  Check labs and abd xray in the morning  Start Clinimix at 100 cc/hour  Check small bowel series    5/20 Continued small bowel obstruction on small bowel series.  Due to age, code status, hypotension, leukocytosis general surgery transfer to higher level of care.

## 2024-05-24 NOTE — PROGRESS NOTES
[facility]  General Surgery  Progress Note    Subjective:     History of Present Illness:  No notes on file    Post-Op Info:  Procedure(s) (LRB):  LAPAROTOMY, EXPLORATORY (N/A)   4 Days Post-Op     Interval History: NAEON. AVSS. Tolerating FLD. Multiple BM. Incision healing well with staples intact. Lab work stable.    Medications:  Continuous Infusions:  Scheduled Meds:   acetaminophen  1,000 mg Oral Q8H    atorvastatin  20 mg Oral Daily    lisinopriL  10 mg Oral Daily    mupirocin   Nasal BID    piperacillin-tazobactam (Zosyn) IV (PEDS and ADULTS) (extended infusion is not appropriate)  4.5 g Intravenous Q8H     PRN Meds:  Current Facility-Administered Medications:     aluminum-magnesium hydroxide-simethicone, 30 mL, Oral, Q6H PRN    dextrose 10%, 12.5 g, Intravenous, PRN    dextrose 10%, 25 g, Intravenous, PRN    glucagon (human recombinant), 1 mg, Intramuscular, PRN    glucose, 16 g, Oral, PRN    glucose, 24 g, Oral, PRN    HYDROmorphone, 0.2 mg, Intravenous, Q6H PRN    HYDROmorphone, 0.5 mg, Intravenous, Q4H PRN    naloxone, 0.02 mg, Intravenous, PRN    ondansetron, 4 mg, Intravenous, Q6H PRN    oxyCODONE, 10 mg, Oral, Q6H PRN    oxyCODONE, 5 mg, Oral, Q6H PRN    prochlorperazine, 5 mg, Intramuscular, Q6H PRN    sodium chloride 0.9%, 10 mL, Intravenous, PRN    sodium chloride 0.9%, 10 mL, Intravenous, Q12H PRN     Review of patient's allergies indicates:   Allergen Reactions    Iodine and iodide containing products      Objective:     Vital Signs (Most Recent):  Temp: 97.7 °F (36.5 °C) (05/24/24 0732)  Pulse: 76 (05/24/24 0732)  Resp: 20 (05/24/24 0732)  BP: (!) 141/64 (05/24/24 0732)  SpO2: 97 % (05/24/24 0732) Vital Signs (24h Range):  Temp:  [97.5 °F (36.4 °C)-98 °F (36.7 °C)] 97.7 °F (36.5 °C)  Pulse:  [62-84] 76  Resp:  [16-20] 20  SpO2:  [95 %-99 %] 97 %  BP: (121-149)/(58-69) 141/64     Weight: 53.7 kg (118 lb 6.2 oz)  Body mass index is 24.74 kg/m².    Intake/Output - Last 3 Shifts         05/22  0700  05/23 0659 05/23 0700 05/24 0659 05/24 0700 05/25 0659    P.O. 120 120     I.V. (mL/kg) 819.2 (14.7)      IV Piggyback 225.1 284.2     Total Intake(mL/kg) 1164.2 (20.9) 404.2 (7.5)     Urine (mL/kg/hr) 230 (0.2) 900 (0.7)     Stool  0     Total Output 230 900     Net +934.2 -495.9            Urine Occurrence 1 x      Stool Occurrence  4 x              Physical Exam  Vitals reviewed.   Constitutional:       Appearance: Normal appearance.   Cardiovascular:      Rate and Rhythm: Normal rate.      Pulses: Normal pulses.   Pulmonary:      Effort: Pulmonary effort is normal.   Abdominal:      General: There is no distension.      Palpations: Abdomen is soft. There is no mass.      Tenderness: There is no guarding.      Comments: Incision healing well with staples intact   Skin:     General: Skin is warm and dry.   Neurological:      General: No focal deficit present.      Mental Status: She is alert and oriented to person, place, and time.          Significant Labs:  I have reviewed all pertinent lab results within the past 24 hours.  CBC:   Recent Labs   Lab 05/24/24  0534   WBC 12.33   RBC 4.49   HGB 13.3   HCT 40.7      MCV 91   MCH 29.6   MCHC 32.7     CMP:   Recent Labs   Lab 05/20/24  0849 05/20/24  1807 05/24/24  0534   *   < > 79   CALCIUM 10.3   < > 8.5*   ALBUMIN 3.2*  --   --    PROT 6.7  --   --       < > 136   K 4.4   < > 3.9   CO2 25   < > 21*   CL 96   < > 106   BUN 74*   < > 17   CREATININE 1.7*   < > 0.8   ALKPHOS 58  --   --    ALT 22  --   --    AST 24  --   --    BILITOT 0.5  --   --     < > = values in this interval not displayed.       Significant Diagnostics:  I have reviewed all pertinent imaging results/findings within the past 24 hours.  Assessment/Plan:     * SBO (small bowel obstruction)  89 yoF with SBO now s/p ex lap and lysis of adhesions on 5/20. Progressing appropriately, now with full return of bowel function.    - Advance to regular diet  - If tolerates, ok to  discharge today from a surgical perspective  - Activity as tolerated  - Ok to leave incision open to air  - We will see her back in clinic in 2 weeks for staple removal and postop check        Luca Lagunas MD  General Surgery  [facility] Surg

## 2024-05-24 NOTE — ASSESSMENT & PLAN NOTE
Continue Zosyn.  Surgery consult. POD#4 exploratory laparotomy  -tolerating regular diet  -passing gas and had a bowel movement  -OOB

## 2024-05-24 NOTE — ASSESSMENT & PLAN NOTE
-patient has been seen by PT/OT, home health recommended  -patient to be discharged home with rolling walker  -Justification for the rolling walker:  The mobility limitation cannot be sufficiently resolved by the use of a cane. Patient's functional mobility deficit can be sufficiently resolved with the use of a (Rolling Walker or Walker). Patient's mobility limitation significantly impairs their ability to participate in one of more activities of daily living. The use of a (RW or Walker) will significantly improve the patient's ability to participate in MRADLS and the patient will use it on regular basis in the home.

## 2024-05-25 VITALS
WEIGHT: 118.38 LBS | DIASTOLIC BLOOD PRESSURE: 55 MMHG | BODY MASS INDEX: 24.85 KG/M2 | OXYGEN SATURATION: 97 % | RESPIRATION RATE: 18 BRPM | TEMPERATURE: 98 F | HEIGHT: 58 IN | SYSTOLIC BLOOD PRESSURE: 113 MMHG | HEART RATE: 73 BPM

## 2024-05-25 LAB
ANION GAP SERPL CALC-SCNC: 9 MMOL/L (ref 8–16)
BACTERIA BLD CULT: NORMAL
BACTERIA BLD CULT: NORMAL
BUN SERPL-MCNC: 12 MG/DL (ref 8–23)
CALCIUM SERPL-MCNC: 8 MG/DL (ref 8.7–10.5)
CHLORIDE SERPL-SCNC: 109 MMOL/L (ref 95–110)
CO2 SERPL-SCNC: 20 MMOL/L (ref 23–29)
CREAT SERPL-MCNC: 0.8 MG/DL (ref 0.5–1.4)
ERYTHROCYTE [DISTWIDTH] IN BLOOD BY AUTOMATED COUNT: 13.3 % (ref 11.5–14.5)
EST. GFR  (NO RACE VARIABLE): >60 ML/MIN/1.73 M^2
GLUCOSE SERPL-MCNC: 101 MG/DL (ref 70–110)
HCT VFR BLD AUTO: 35.6 % (ref 37–48.5)
HGB BLD-MCNC: 12.1 G/DL (ref 12–16)
MAGNESIUM SERPL-MCNC: 1.8 MG/DL (ref 1.6–2.6)
MCH RBC QN AUTO: 29.7 PG (ref 27–31)
MCHC RBC AUTO-ENTMCNC: 34 G/DL (ref 32–36)
MCV RBC AUTO: 88 FL (ref 82–98)
PLATELET # BLD AUTO: 199 K/UL (ref 150–450)
PMV BLD AUTO: 10.8 FL (ref 9.2–12.9)
POTASSIUM SERPL-SCNC: 3.4 MMOL/L (ref 3.5–5.1)
RBC # BLD AUTO: 4.07 M/UL (ref 4–5.4)
SODIUM SERPL-SCNC: 138 MMOL/L (ref 136–145)
WBC # BLD AUTO: 12.47 K/UL (ref 3.9–12.7)

## 2024-05-25 PROCEDURE — 25000003 PHARM REV CODE 250: Mod: HCNC

## 2024-05-25 PROCEDURE — 25000003 PHARM REV CODE 250: Mod: HCNC | Performed by: INTERNAL MEDICINE

## 2024-05-25 PROCEDURE — 80048 BASIC METABOLIC PNL TOTAL CA: CPT | Mod: HCNC | Performed by: INTERNAL MEDICINE

## 2024-05-25 PROCEDURE — 97530 THERAPEUTIC ACTIVITIES: CPT | Mod: HCNC,CQ

## 2024-05-25 PROCEDURE — 36415 COLL VENOUS BLD VENIPUNCTURE: CPT | Mod: HCNC | Performed by: INTERNAL MEDICINE

## 2024-05-25 PROCEDURE — 63600175 PHARM REV CODE 636 W HCPCS: Mod: HCNC | Performed by: INTERNAL MEDICINE

## 2024-05-25 PROCEDURE — 83735 ASSAY OF MAGNESIUM: CPT | Mod: HCNC | Performed by: INTERNAL MEDICINE

## 2024-05-25 PROCEDURE — 93010 ELECTROCARDIOGRAM REPORT: CPT | Mod: HCNC,,, | Performed by: INTERNAL MEDICINE

## 2024-05-25 PROCEDURE — 63600175 PHARM REV CODE 636 W HCPCS: Mod: HCNC | Performed by: STUDENT IN AN ORGANIZED HEALTH CARE EDUCATION/TRAINING PROGRAM

## 2024-05-25 PROCEDURE — 85027 COMPLETE CBC AUTOMATED: CPT | Mod: HCNC | Performed by: INTERNAL MEDICINE

## 2024-05-25 PROCEDURE — 93005 ELECTROCARDIOGRAM TRACING: CPT | Mod: HCNC

## 2024-05-25 RX ORDER — LOPERAMIDE HYDROCHLORIDE 2 MG/1
2 CAPSULE ORAL 4 TIMES DAILY PRN
Status: DISCONTINUED | OUTPATIENT
Start: 2024-05-25 | End: 2024-05-25 | Stop reason: HOSPADM

## 2024-05-25 RX ORDER — POTASSIUM CHLORIDE 20 MEQ/1
40 TABLET, EXTENDED RELEASE ORAL ONCE
Status: DISCONTINUED | OUTPATIENT
Start: 2024-05-25 | End: 2024-05-25

## 2024-05-25 RX ORDER — SODIUM CHLORIDE 9 MG/ML
INJECTION, SOLUTION INTRAVENOUS CONTINUOUS
Status: DISCONTINUED | OUTPATIENT
Start: 2024-05-25 | End: 2024-05-25 | Stop reason: HOSPADM

## 2024-05-25 RX ADMIN — OXYCODONE 5 MG: 5 TABLET ORAL at 08:05

## 2024-05-25 RX ADMIN — SODIUM CHLORIDE, POTASSIUM CHLORIDE, SODIUM LACTATE AND CALCIUM CHLORIDE 500 ML: 600; 310; 30; 20 INJECTION, SOLUTION INTRAVENOUS at 12:05

## 2024-05-25 RX ADMIN — MUPIROCIN: 20 OINTMENT TOPICAL at 08:05

## 2024-05-25 RX ADMIN — LOPERAMIDE HYDROCHLORIDE 2 MG: 2 CAPSULE ORAL at 10:05

## 2024-05-25 RX ADMIN — ATORVASTATIN CALCIUM 20 MG: 20 TABLET, FILM COATED ORAL at 08:05

## 2024-05-25 RX ADMIN — PIPERACILLIN SODIUM AND TAZOBACTAM SODIUM 4.5 G: 4; .5 INJECTION, POWDER, LYOPHILIZED, FOR SOLUTION INTRAVENOUS at 10:05

## 2024-05-25 RX ADMIN — POTASSIUM BICARBONATE 40 MEQ: 391 TABLET, EFFERVESCENT ORAL at 10:05

## 2024-05-25 RX ADMIN — SODIUM CHLORIDE: 9 INJECTION, SOLUTION INTRAVENOUS at 10:05

## 2024-05-25 RX ADMIN — PIPERACILLIN SODIUM AND TAZOBACTAM SODIUM 4.5 G: 4; .5 INJECTION, POWDER, LYOPHILIZED, FOR SOLUTION INTRAVENOUS at 02:05

## 2024-05-25 RX ADMIN — LISINOPRIL 10 MG: 10 TABLET ORAL at 08:05

## 2024-05-25 NOTE — ASSESSMENT & PLAN NOTE
Continue Zosyn.  Surgery consult. POD#5 exploratory laparotomy  -tolerating regular diet  -passing gas and had a bowel movement  -OOB

## 2024-05-25 NOTE — PT/OT/SLP PROGRESS
Physical Therapy Treatment    Patient Name:  Alexandra Gleason   MRN:  [id number]    Recommendations:     Discharge Recommendations: Low Intensity Therapy (HH PT with increased family assistance)  Discharge Equipment Recommendations: walker, rolling  Barriers to discharge: None    Assessment:     Alexandra Gleason is a 89 y.o. female admitted with a medical diagnosis of SBO (small bowel obstruction).  She presents with the following impairments/functional limitations: weakness, impaired endurance, decreased coordination, impaired functional mobility, gait instability, decreased lower extremity function, impaired self care skills.    Rehab Prognosis: Good; patient would benefit from acute skilled PT services to address these deficits and reach maximum level of function.    Recent Surgery: Procedure(s) (LRB):  LAPAROTOMY, EXPLORATORY (N/A) 5 Days Post-Op    Plan:     During this hospitalization, patient to be seen 5 x/week to address the identified rehab impairments via gait training, therapeutic activities, therapeutic exercises, neuromuscular re-education and progress toward the following goals:    Plan of Care Expires:  06/22/24    Subjective     Chief Complaint: Pt with no complaints or concerns at this time.   Patient/Family Comments/goals: Pt agreeable to PT treatment.   Pain/Comfort:  Pain Rating 1: 0/10      Objective:     Patient found HOB elevated with telemetry, bed alarm, peripheral IV upon PT entry to room.     General Precautions: Standard, fall  Orthopedic Precautions: N/A  Braces: N/A       Functional Mobility:  Bed Mobility:     Supine to Sit: contact guard assistance  Sit to Supine: contact guard assistance  Transfers:     Sit to Stand:  contact guard assistance with rolling walker  Gait: Pt ambulated 20-30 ft with RW, CGA requiring verbal cueing on proper AD usage.  Pt declined further ambulation due to feeling increased fatigue.   Balance: Pt with good sitting and fair standing balance.        AM-PAC 6 CLICK MOBILITY  Turning over in bed (including adjusting bedclothes, sheets and blankets)?: 3  Sitting down on and standing up from a chair with arms (e.g., wheelchair, bedside commode, etc.): 3  Moving from lying on back to sitting on the side of the bed?: 3  Moving to and from a bed to a chair (including a wheelchair)?: 3  Need to walk in hospital room?: 3  Climbing 3-5 steps with a railing?: 2  Basic Mobility Total Score: 17       Treatment & Education:      Patient left HOB elevated with all lines intact, call button in reach, and family and MD present..    GOALS:   Multidisciplinary Problems       Physical Therapy Goals          Problem: Physical Therapy    Goal Priority Disciplines Outcome Goal Variances Interventions   Physical Therapy Goal     PT, PT/OT Progressing     Description: Goals to be met by: 24     Patient will increase functional independence with mobility by performin. Supine to sit with Stand-by Assistance  2. Sit to supine with Stand-by Assistance  3. Sit to stand transfer with Modified [place] with use of LRAD.   4. Bed to chair transfer with Modified [place] using LRAD.   5. Gait  x 150 feet with Modified [place] using LRAD.                          Time Tracking:     PT Received On: 24  PT Start Time: 928     PT Stop Time: 948  PT Total Time (min): 20 min     Billable Minutes: Therapeutic Activity 20    Treatment Type: Treatment  PT/PTA: PTA     Number of PTA visits since last PT visit: 3     2024

## 2024-05-25 NOTE — DISCHARGE SUMMARY
[facility] Medicine  Discharge Summary      Patient Name: Alexandra Gleason  MRN: [id number]  LAMONT: [id number]  Patient Class: IP- Inpatient  Admission Date: 5/20/2024  Hospital Length of Stay: 5 days  Discharge Date and Time:  05/25/2024 3:54 PM  Attending Physician: Marcelino Reed MD   Discharging Provider: Marcelino Reed MD  Primary Care Provider: Carla Butler MD    Primary Care Team: Networked reference to record PCT     HPI:   89 years old woman with past medical history of hypertension, hyperlipidemia, coronary artery disease, hysterectomy, cholecystectomy, and appendicectomy.  The patient is very functional at baseline.  She presented to outside facility for abdominal pain, constipation and she was found to have a small-bowel obstruction evident by CT.  The patient failed conservative therapy and she was referred to our hospital for surgical intervention.      Procedure(s) (LRB):  LAPAROTOMY, EXPLORATORY (N/A)      Hospital Course:   89 years old woman with past medical history of hypertension, hyperlipidemia, coronary artery disease, hysterectomy, cholecystectomy, and appendicectomy. She presented to outside facility for abdominal pain, constipation and she was found to have a small-bowel obstruction evident by CT. The patient failed conservative therapy and she was referred to our hospital for surgical intervention.  On admission general surgery was consulted as well as Cardiology for surgery clearance.  Patient was considered elevated risk secondary to advanced age and pre-existing comorbidities.  NG tube was inserted, IV fluids started in the patient was started on IV antibiotics as well.  She was later taken for surgery and found to have: Small adhesion in LLQ pelvis obstructing ileum, no resection and no perforation.  Patient was tolerated the surgery well and had no complications.  She was given IV fluids for ALEXEY which has resolved.  NG tube was removed and patient started on clear  liquid diet.  She was successfully upgraded to regular diet.  She does not have a bowel movement for a few days but is now passing gas and passing stools.  She initially had tenderness at the site of surgery but this is now resolved.  Patient was comorbidities were managed adequately during admission.  Patient had episodes of loose stool which resolved with Imodium.  She was seen by PT/OT who recommended discharge with home health.  Patient was doing well and will be discharged home today with strict instructions to follow up with her PCP within 3-5 days.  She has been cleared for discharge by surgery team as well.  Family and patient were educated on the need to adhere to proper diet and exercise after discharge.  Patient was to return back to the emergency department she develops any symptoms.              Goals of Care Treatment Preferences:  Code Status: Full Code      Consults:   Consults (From admission, onward)          Status Ordering Provider     Inpatient consult to General Surgery  Once        Provider:  Meño Foster MD    Completed GERMÁN RUIZ            Cardiac/Vascular  Coronary artery disease involving native coronary artery of native heart without angina pectoris  No acute issues  Continue to monitor    HTN (hypertension)  -continue with lisinopril and statins  -monitor vitals    Renal/  Hyperkalemia  This patient has hyperkalemia which is uncontrolled. We will monitor for arrhythmias with EKG or continuous telemetry. We will treat the hyperkalemia with Potassium Binders and repeat labs . The likely etiology of the hyperkalemia is  patient was on potassium chloride IV .  The patients latest potassium has been reviewed and the results are listed below  Recent Labs   Lab 05/25/24  0555   K 3.4*       -stop potassium chloride IV  -repeat labs  -continue with normal saline          ALEXEY (acute kidney injury)  Likely prerenal  Avoid nephrotoxin  S/p IV fluid  Resolved    GI  * SBO  (small bowel obstruction)    Continue Zosyn.  Surgery consult. POD#5 exploratory laparotomy  -tolerating regular diet  -passing gas and had a bowel movement  -OOB    Other  Weakness  -patient has been seen by PT/OT, home health recommended  -patient to be discharged home with rolling walker  -Justification for the rolling walker:  The mobility limitation cannot be sufficiently resolved by the use of a cane. Patient's functional mobility deficit can be sufficiently resolved with the use of a (Rolling Walker or Walker). Patient's mobility limitation significantly impairs their ability to participate in one of more activities of daily living. The use of a (RW or Walker) will significantly improve the patient's ability to participate in MRADLS and the patient will use it on regular basis in the home.         Final Active Diagnoses:    Diagnosis Date Noted POA    PRINCIPAL PROBLEM:  SBO (small bowel obstruction) [K56.609] 05/17/2024 Yes    Weakness [R53.1] 05/24/2024 Yes    Hyperkalemia [E87.5] 05/21/2024 No    ALEXEY (acute kidney injury) [N17.9] 05/20/2024 Unknown    Coronary artery disease involving native coronary artery of native heart without angina pectoris [I25.10] 04/05/2019 Yes    HTN (hypertension) [I10] 08/12/2018 Yes      Problems Resolved During this Admission:       Discharged Condition: stable    Disposition: Home-Health Care [facility]    Follow Up:   Follow-up Information       Meño Foster MD Follow up on 6/3/2024.    Specialties: Surgery, General Surgery  Why: 10:20am 2wk p/o exploratory lap  Contact information:  [address]  [address]  [facility] [id number] [phone]               [place], [facility] - Follow up on 5/26/2024.    Specialties: Home Health Services, Hospice and Palliative Medicine  Why: The pt will receive home health services from the agency listed above. The staff will call with an arrival time.  Contact information:  [address] [address]  [place]YNOL[place]  "[phone]  [phone]               [place], [facility] up.    Specialty: DME Provider  Why: The pt received a rolling walker from the agency listed above.  Contact information:  [address]  [place] [address] [phone]                           Patient Instructions:      HME - OTHER   Order Comments: The mobility limitation cannot be sufficiently resolved by the use of a cane. Patient's functional mobility deficit can be sufficiently resolved with the use of a (Rolling Walker or Walker). Patient's mobility limitation significantly impairs their ability to participate in one of more activities of daily living. The use of a (RW or Walker) will significantly improve the patient's ability to participate in MRADLS and the patient will use it on regular basis in the home.     Order Specific Question Answer Comments   Type of Equipment: Rolling walker    Height: 4' 10" (1.473 m)    Weight: 53.7 kg (118 lb 6.2 oz)    Does patient have medical equipment at home? none        Significant Diagnostic Studies: Labs: BMP:   Recent Labs   Lab 05/24/24  0534 05/25/24  0555   GLU 79 101    138   K 3.9 3.4*    109   CO2 21* 20*   BUN 17 12   CREATININE 0.8 0.8   CALCIUM 8.5* 8.0*   MG  --  1.8   , CMP   Recent Labs   Lab 05/24/24  0534 05/25/24  0555    138   K 3.9 3.4*    109   CO2 21* 20*   GLU 79 101   BUN 17 12   CREATININE 0.8 0.8   CALCIUM 8.5* 8.0*   ANIONGAP 9 9   , CBC   Recent Labs   Lab 05/24/24  0534 05/25/24  0555   WBC 12.33 12.47   HGB 13.3 12.1   HCT 40.7 35.6*    199   , and All labs within the past 24 hours have been reviewed  Radiology: CT scan: CT ABDOMEN PELVIS WITHOUT CONTRAST: No results found for this visit on 05/20/24.    Pending Diagnostic Studies:       None           Medications:  Reconciled Home Medications:      Medication List        START taking these medications      walker Misc  1 each by Misc.(Non-Drug; Combo Route) route as needed.        "     CONTINUE taking these medications      atorvastatin 20 MG tablet  Commonly known as: LIPITOR  Take 20 mg by mouth once daily.     lisinopriL 10 MG tablet  lisinopriL 10 mg tablet, [RxNorm: 495120]     primidone 50 MG Tab  Commonly known as: MYSOLINE  TAKE 2 TABLETS EVERY EVENING              Indwelling Lines/Drains at time of discharge:   Lines/Drains/Airways       None                   Time spent on the discharge of patient: 34 minutes         Marcelino Reed MD  Department of Hospital Medicine  [facility]

## 2024-05-25 NOTE — NURSING
AVS reviewed with patient and family by virtual nurse. Verbalized understanding of upcoming appointments and home medications. IV removed. RW delivered to bedside. Voiced no concerns. W/c transport to escort patient and belongings to main lobby.

## 2024-05-25 NOTE — PLAN OF CARE
05/25/24 0948   Post-Acute Status   Post-Acute Authorization HME   HME Status Set-up Complete/Auth obtained  (Rolling walker delivered bedside to the patient's room)

## 2024-05-25 NOTE — PROGRESS NOTES
[facility] S[facility]  General Surgery  Progress Note    Subjective:     Interval History:   Feeling well  Satnam diet  Some loose stool, seems to be improving  No pain no nausea    Post-Op Info:  Procedure(s) (LRB):  LAPAROTOMY, EXPLORATORY (N/A)   5 Days Post-Op      Medications:  Continuous Infusions:   sodium chloride 0.9%   Intravenous Continuous 100 mL/hr at 05/25/24 1037 New Bag at 05/25/24 1037     Scheduled Meds:   acetaminophen  1,000 mg Oral Q8H    atorvastatin  20 mg Oral Daily    lisinopriL  10 mg Oral Daily    mupirocin   Nasal BID    piperacillin-tazobactam (Zosyn) IV (PEDS and ADULTS) (extended infusion is not appropriate)  4.5 g Intravenous Q8H     PRN Meds:  Current Facility-Administered Medications:     aluminum-magnesium hydroxide-simethicone, 30 mL, Oral, Q6H PRN    dextrose 10%, 12.5 g, Intravenous, PRN    dextrose 10%, 25 g, Intravenous, PRN    glucagon (human recombinant), 1 mg, Intramuscular, PRN    glucose, 16 g, Oral, PRN    glucose, 24 g, Oral, PRN    HYDROmorphone, 0.2 mg, Intravenous, Q6H PRN    HYDROmorphone, 0.5 mg, Intravenous, Q4H PRN    loperamide, 2 mg, Oral, QID PRN    naloxone, 0.02 mg, Intravenous, PRN    ondansetron, 4 mg, Intravenous, Q6H PRN    oxyCODONE, 10 mg, Oral, Q6H PRN    oxyCODONE, 5 mg, Oral, Q6H PRN    prochlorperazine, 5 mg, Intramuscular, Q6H PRN    sodium chloride 0.9%, 10 mL, Intravenous, PRN    sodium chloride 0.9%, 10 mL, Intravenous, Q12H PRN     Objective:     Vital Signs (Most Recent):  Temp: 98 °F (36.7 °C) (05/25/24 0703)  Pulse: 74 (05/25/24 0956)  Resp: 18 (05/25/24 0834)  BP: (!) 90/53 (05/25/24 0956)  SpO2: 97 % (05/25/24 0956) Vital Signs (24h Range):  Temp:  [97.6 °F (36.4 °C)-98.2 °F (36.8 °C)] 98 °F (36.7 °C)  Pulse:  [68-97] 74  Resp:  [18] 18  SpO2:  [94 %-97 %] 97 %  BP: ()/(46-65) 90/53       Intake/Output Summary (Last 24 hours) at 5/25/2024 1054  Last data filed at 5/25/2024 0930  Gross per 24 hour   Intake 1289.89 ml   Output 152 ml   Net  1137.89 ml       Physical Exam  Ab soft incision good    Significant Labs:  CBC:   Recent Labs   Lab 05/25/24  0555   WBC 12.47   RBC 4.07   HGB 12.1   HCT 35.6*      MCV 88   MCH 29.7   MCHC 34.0     CMP:   Recent Labs   Lab 05/25/24  0555      CALCIUM 8.0*      K 3.4*   CO2 20*      BUN 12   CREATININE 0.8       Significant Diagnostics:  None    Assessment/Plan:     Active Diagnoses:    Diagnosis Date Noted POA    PRINCIPAL PROBLEM:  SBO (small bowel obstruction) [K56.609] 05/17/2024 Yes    Weakness [R53.1] 05/24/2024 Yes    Hyperkalemia [E87.5] 05/21/2024 No    ALEXEY (acute kidney injury) [N17.9] 05/20/2024 Unknown    Coronary artery disease involving native coronary artery of native heart without angina pectoris [I25.10] 04/05/2019 Yes    HTN (hypertension) [I10] 08/12/2018 Yes      Problems Resolved During this Admission:     S/p ex lap Trevon  Doing well  Ok for imodium from my standpoint  May also try a little fiber supplement if stool continues to be loose  Replace k  Overall improving, wants to go home. Ok for discharge from my standpoint later today    Meño Foster MD  General Surgery  [facility]

## 2024-05-25 NOTE — HOSPITAL COURSE
89 years old woman with past medical history of hypertension, hyperlipidemia, coronary artery disease, hysterectomy, cholecystectomy, and appendicectomy. She presented to outside facility for abdominal pain, constipation and she was found to have a small-bowel obstruction evident by CT. The patient failed conservative therapy and she was referred to our hospital for surgical intervention.  On admission general surgery was consulted as well as Cardiology for surgery clearance.  Patient was considered elevated risk secondary to advanced age and pre-existing comorbidities.  NG tube was inserted, IV fluids started in the patient was started on IV antibiotics as well.  She was later taken for surgery and found to have: Small adhesion in LLQ pelvis obstructing ileum, no resection and no perforation.  Patient was tolerated the surgery well and had no complications.  She was given IV fluids for ALEXEY which has resolved.  NG tube was removed and patient started on clear liquid diet.  She was successfully upgraded to regular diet.  She does not have a bowel movement for a few days but is now passing gas and passing stools.  She initially had tenderness at the site of surgery but this is now resolved.  Patient was comorbidities were managed adequately during admission.  Patient had episodes of loose stool which resolved with Imodium.  She was seen by PT/OT who recommended discharge with home health.  Patient was doing well and will be discharged home today with strict instructions to follow up with her PCP within 3-5 days.  She has been cleared for discharge by surgery team as well.  Family and patient were educated on the need to adhere to proper diet and exercise after discharge.  Patient was to return back to the emergency department she develops any symptoms.

## 2024-05-25 NOTE — ASSESSMENT & PLAN NOTE
This patient has hyperkalemia which is uncontrolled. We will monitor for arrhythmias with EKG or continuous telemetry. We will treat the hyperkalemia with Potassium Binders and repeat labs . The likely etiology of the hyperkalemia is  patient was on potassium chloride IV .  The patients latest potassium has been reviewed and the results are listed below  Recent Labs   Lab 05/25/24  0555   K 3.4*       -stop potassium chloride IV  -repeat labs  -continue with normal saline

## 2024-05-25 NOTE — PLAN OF CARE
Problem: Adult Inpatient Plan of Care  Goal: Plan of Care Review  Outcome: Progressing  Goal: Patient-Specific Goal (Individualized)  Outcome: Progressing  Goal: Absence of Hospital-Acquired Illness or Injury  Outcome: Progressing  Goal: Optimal Comfort and Wellbeing  Outcome: Progressing  Goal: Readiness for Transition of Care  Outcome: Progressing     Problem: Sepsis/Septic Shock  Goal: Optimal Coping  Outcome: Progressing  Goal: Absence of Infection Signs and Symptoms  Outcome: Progressing     Problem: Fall Injury Risk  Goal: Absence of Fall and Fall-Related Injury  Outcome: Progressing     Problem: Skin Injury Risk Increased  Goal: Skin Health and Integrity  Outcome: Progressing

## 2024-05-25 NOTE — NURSING
"Pt w/low BP. Pt reports feeling, "light-headed".    MD notified. Orders received for 500cc bolus LR.  "

## 2024-05-25 NOTE — PLAN OF CARE
Discharge orders noted. AVS prepared with medication list, importance of medication compliance, follow up appointments, diet, home care instructions, treatment plan, self management, and when to seek medical attention. Detailed clinical reference list attached. AVS printed and handed to patient by bedside nurse. VN reviewed discharge instructions with patient, dtr and honorioghter using teachback method.  Allowed time for questions, all questions answered.  Patient, dtr and grandaughter verbalized complete understanding of discharge instructions and voices no concerns.      Discharge instructions complete.  Pt has her walker,  Transport wheelchair requested.  Bedside nurse notified.

## 2024-05-27 LAB
OHS QRS DURATION: 74 MS
OHS QTC CALCULATION: 469 MS

## 2024-05-28 ENCOUNTER — PATIENT OUTREACH (OUTPATIENT)
Dept: ADMINISTRATIVE | Facility: CLINIC | Age: 89
End: 2024-05-28
Payer: MEDICARE

## 2024-05-28 NOTE — PROGRESS NOTES
C3 nurse attempted to contact Alexandra Gleason for a TCC post hospital discharge follow up call. No answer. No voicemail available. The patient has a scheduled HOSFU appointment with Meño Foster MD (general surgery) on 6/3/24 @ 10:20am.

## 2024-05-29 NOTE — PROGRESS NOTES
3rd attempt-C3 nurse attempted to contact Alexandra Gleason for a TCC post hospital discharge follow up call. No answer. No voicemail available. The patient has a scheduled HOSFU appointment with Meño Foster MD (general surgery) on 6/3/24 @ 10:20am.

## 2024-06-03 ENCOUNTER — HOSPITAL ENCOUNTER (OUTPATIENT)
Facility: HOSPITAL | Age: 89
Discharge: HOME | End: 2024-06-04
Attending: EMERGENCY MEDICINE | Admitting: STUDENT IN AN ORGANIZED HEALTH CARE EDUCATION/TRAINING PROGRAM
Payer: MEDICARE

## 2024-06-03 ENCOUNTER — OFFICE VISIT (OUTPATIENT)
Dept: SURGERY | Facility: CLINIC | Age: 89
End: 2024-06-03
Payer: MEDICARE

## 2024-06-03 VITALS
HEIGHT: 58 IN | WEIGHT: 109.13 LBS | DIASTOLIC BLOOD PRESSURE: 77 MMHG | BODY MASS INDEX: 22.91 KG/M2 | HEART RATE: 63 BPM | SYSTOLIC BLOOD PRESSURE: 122 MMHG

## 2024-06-03 DIAGNOSIS — I82.431 ACUTE DEEP VEIN THROMBOSIS (DVT) OF POPLITEAL VEIN OF RIGHT LOWER EXTREMITY: ICD-10-CM

## 2024-06-03 DIAGNOSIS — I82.411 ACUTE DEEP VEIN THROMBOSIS (DVT) OF FEMORAL VEIN OF RIGHT LOWER EXTREMITY: Primary | ICD-10-CM

## 2024-06-03 DIAGNOSIS — Z98.890 S/P LAPAROTOMY: Primary | ICD-10-CM

## 2024-06-03 DIAGNOSIS — R07.9 CHEST PAIN: ICD-10-CM

## 2024-06-03 DIAGNOSIS — M79.89 SWELLING OF RIGHT LOWER EXTREMITY: ICD-10-CM

## 2024-06-03 PROBLEM — Z71.89 GOALS OF CARE, COUNSELING/DISCUSSION: Status: ACTIVE | Noted: 2024-06-03

## 2024-06-03 LAB
ALBUMIN SERPL BCP-MCNC: 3.1 G/DL (ref 3.5–5.2)
ALP SERPL-CCNC: 67 U/L (ref 55–135)
ALT SERPL W/O P-5'-P-CCNC: 18 U/L (ref 10–44)
ANION GAP SERPL CALC-SCNC: 10 MMOL/L (ref 8–16)
APTT PPP: 27 SEC (ref 21–32)
APTT PPP: 27.6 SEC (ref 21–32)
AST SERPL-CCNC: 24 U/L (ref 10–40)
BASOPHILS # BLD AUTO: 0.1 K/UL (ref 0–0.2)
BASOPHILS NFR BLD: 1.2 % (ref 0–1.9)
BILIRUB SERPL-MCNC: 0.5 MG/DL (ref 0.1–1)
BUN SERPL-MCNC: 18 MG/DL (ref 8–23)
CALCIUM SERPL-MCNC: 9.2 MG/DL (ref 8.7–10.5)
CHLORIDE SERPL-SCNC: 105 MMOL/L (ref 95–110)
CO2 SERPL-SCNC: 21 MMOL/L (ref 23–29)
CREAT SERPL-MCNC: 0.8 MG/DL (ref 0.5–1.4)
DIFFERENTIAL METHOD BLD: ABNORMAL
EOSINOPHIL # BLD AUTO: 0.1 K/UL (ref 0–0.5)
EOSINOPHIL NFR BLD: 1.7 % (ref 0–8)
ERYTHROCYTE [DISTWIDTH] IN BLOOD BY AUTOMATED COUNT: 13.4 % (ref 11.5–14.5)
EST. GFR  (NO RACE VARIABLE): >60 ML/MIN/1.73 M^2
GLUCOSE SERPL-MCNC: 83 MG/DL (ref 70–110)
HCT VFR BLD AUTO: 34.6 % (ref 37–48.5)
HGB BLD-MCNC: 11.7 G/DL (ref 12–16)
IMM GRANULOCYTES # BLD AUTO: 0.02 K/UL (ref 0–0.04)
IMM GRANULOCYTES NFR BLD AUTO: 0.2 % (ref 0–0.5)
INR PPP: 1.1 (ref 0.8–1.2)
LYMPHOCYTES # BLD AUTO: 1.7 K/UL (ref 1–4.8)
LYMPHOCYTES NFR BLD: 20.3 % (ref 18–48)
MCH RBC QN AUTO: 29.3 PG (ref 27–31)
MCHC RBC AUTO-ENTMCNC: 33.8 G/DL (ref 32–36)
MCV RBC AUTO: 87 FL (ref 82–98)
MONOCYTES # BLD AUTO: 0.8 K/UL (ref 0.3–1)
MONOCYTES NFR BLD: 9.6 % (ref 4–15)
NEUTROPHILS # BLD AUTO: 5.5 K/UL (ref 1.8–7.7)
NEUTROPHILS NFR BLD: 67 % (ref 38–73)
NRBC BLD-RTO: 0 /100 WBC
PLATELET # BLD AUTO: 387 K/UL (ref 150–450)
PMV BLD AUTO: 9.6 FL (ref 9.2–12.9)
POTASSIUM SERPL-SCNC: 4 MMOL/L (ref 3.5–5.1)
PROT SERPL-MCNC: 6.2 G/DL (ref 6–8.4)
PROTHROMBIN TIME: 11.8 SEC (ref 9–12.5)
RBC # BLD AUTO: 4 M/UL (ref 4–5.4)
SODIUM SERPL-SCNC: 136 MMOL/L (ref 136–145)
WBC # BLD AUTO: 8.23 K/UL (ref 3.9–12.7)

## 2024-06-03 PROCEDURE — 85730 THROMBOPLASTIN TIME PARTIAL: CPT | Mod: HCNC | Performed by: STUDENT IN AN ORGANIZED HEALTH CARE EDUCATION/TRAINING PROGRAM

## 2024-06-03 PROCEDURE — 99024 POSTOP FOLLOW-UP VISIT: CPT | Mod: HCNC,S$GLB,, | Performed by: STUDENT IN AN ORGANIZED HEALTH CARE EDUCATION/TRAINING PROGRAM

## 2024-06-03 PROCEDURE — 99285 EMERGENCY DEPT VISIT HI MDM: CPT | Mod: 25,HCNC

## 2024-06-03 PROCEDURE — G0378 HOSPITAL OBSERVATION PER HR: HCPCS | Mod: HCNC

## 2024-06-03 PROCEDURE — 1126F AMNT PAIN NOTED NONE PRSNT: CPT | Mod: HCNC,CPTII,S$GLB, | Performed by: STUDENT IN AN ORGANIZED HEALTH CARE EDUCATION/TRAINING PROGRAM

## 2024-06-03 PROCEDURE — 99999 PR PBB SHADOW E&M-EST. PATIENT-LVL III: CPT | Mod: PBBFAC,HCNC,, | Performed by: STUDENT IN AN ORGANIZED HEALTH CARE EDUCATION/TRAINING PROGRAM

## 2024-06-03 PROCEDURE — 96365 THER/PROPH/DIAG IV INF INIT: CPT

## 2024-06-03 PROCEDURE — 1159F MED LIST DOCD IN RCRD: CPT | Mod: HCNC,CPTII,S$GLB, | Performed by: STUDENT IN AN ORGANIZED HEALTH CARE EDUCATION/TRAINING PROGRAM

## 2024-06-03 PROCEDURE — 36415 COLL VENOUS BLD VENIPUNCTURE: CPT | Mod: HCNC | Performed by: STUDENT IN AN ORGANIZED HEALTH CARE EDUCATION/TRAINING PROGRAM

## 2024-06-03 PROCEDURE — 80053 COMPREHEN METABOLIC PANEL: CPT | Mod: HCNC | Performed by: EMERGENCY MEDICINE

## 2024-06-03 PROCEDURE — 63600175 PHARM REV CODE 636 W HCPCS: Mod: HCNC | Performed by: STUDENT IN AN ORGANIZED HEALTH CARE EDUCATION/TRAINING PROGRAM

## 2024-06-03 PROCEDURE — 96366 THER/PROPH/DIAG IV INF ADDON: CPT

## 2024-06-03 PROCEDURE — 3288F FALL RISK ASSESSMENT DOCD: CPT | Mod: HCNC,CPTII,S$GLB, | Performed by: STUDENT IN AN ORGANIZED HEALTH CARE EDUCATION/TRAINING PROGRAM

## 2024-06-03 PROCEDURE — 85025 COMPLETE CBC W/AUTO DIFF WBC: CPT | Mod: HCNC | Performed by: EMERGENCY MEDICINE

## 2024-06-03 PROCEDURE — 85610 PROTHROMBIN TIME: CPT | Mod: HCNC | Performed by: EMERGENCY MEDICINE

## 2024-06-03 PROCEDURE — 1160F RVW MEDS BY RX/DR IN RCRD: CPT | Mod: HCNC,CPTII,S$GLB, | Performed by: STUDENT IN AN ORGANIZED HEALTH CARE EDUCATION/TRAINING PROGRAM

## 2024-06-03 PROCEDURE — 1101F PT FALLS ASSESS-DOCD LE1/YR: CPT | Mod: HCNC,CPTII,S$GLB, | Performed by: STUDENT IN AN ORGANIZED HEALTH CARE EDUCATION/TRAINING PROGRAM

## 2024-06-03 RX ORDER — IBUPROFEN 200 MG
24 TABLET ORAL
Status: DISCONTINUED | OUTPATIENT
Start: 2024-06-03 | End: 2024-06-04 | Stop reason: HOSPADM

## 2024-06-03 RX ORDER — CHOLECALCIFEROL (VITAMIN D3) 25 MCG
1000 TABLET ORAL DAILY
Status: DISCONTINUED | OUTPATIENT
Start: 2024-06-04 | End: 2024-06-04 | Stop reason: HOSPADM

## 2024-06-03 RX ORDER — CHOLECALCIFEROL (VITAMIN D3) 25 MCG
1000 TABLET ORAL DAILY
COMMUNITY

## 2024-06-03 RX ORDER — HEPARIN SODIUM,PORCINE/D5W 25000/250
0-40 INTRAVENOUS SOLUTION INTRAVENOUS CONTINUOUS
Status: DISCONTINUED | OUTPATIENT
Start: 2024-06-03 | End: 2024-06-04

## 2024-06-03 RX ORDER — GLUCAGON 1 MG
1 KIT INJECTION
Status: DISCONTINUED | OUTPATIENT
Start: 2024-06-03 | End: 2024-06-04 | Stop reason: HOSPADM

## 2024-06-03 RX ORDER — IBUPROFEN 200 MG
16 TABLET ORAL
Status: DISCONTINUED | OUTPATIENT
Start: 2024-06-03 | End: 2024-06-04 | Stop reason: HOSPADM

## 2024-06-03 RX ORDER — LISINOPRIL 10 MG/1
10 TABLET ORAL DAILY
Status: DISCONTINUED | OUTPATIENT
Start: 2024-06-04 | End: 2024-06-04 | Stop reason: HOSPADM

## 2024-06-03 RX ORDER — SERTRALINE HYDROCHLORIDE 25 MG/1
25 TABLET, FILM COATED ORAL DAILY
COMMUNITY

## 2024-06-03 RX ORDER — NALOXONE HCL 0.4 MG/ML
0.02 VIAL (ML) INJECTION
Status: DISCONTINUED | OUTPATIENT
Start: 2024-06-03 | End: 2024-06-04 | Stop reason: HOSPADM

## 2024-06-03 RX ORDER — SERTRALINE HYDROCHLORIDE 25 MG/1
25 TABLET, FILM COATED ORAL DAILY
Status: DISCONTINUED | OUTPATIENT
Start: 2024-06-04 | End: 2024-06-04 | Stop reason: HOSPADM

## 2024-06-03 RX ORDER — ATORVASTATIN CALCIUM 20 MG/1
20 TABLET, FILM COATED ORAL DAILY
Status: DISCONTINUED | OUTPATIENT
Start: 2024-06-04 | End: 2024-06-04 | Stop reason: HOSPADM

## 2024-06-03 RX ORDER — HYDROCODONE BITARTRATE AND ACETAMINOPHEN 5; 325 MG/1; MG/1
1 TABLET ORAL EVERY 6 HOURS PRN
Status: DISCONTINUED | OUTPATIENT
Start: 2024-06-04 | End: 2024-06-04 | Stop reason: HOSPADM

## 2024-06-03 RX ORDER — ACETAMINOPHEN 325 MG/1
650 TABLET ORAL EVERY 6 HOURS PRN
Status: DISCONTINUED | OUTPATIENT
Start: 2024-06-04 | End: 2024-06-04 | Stop reason: HOSPADM

## 2024-06-03 RX ORDER — SODIUM CHLORIDE 0.9 % (FLUSH) 0.9 %
10 SYRINGE (ML) INJECTION EVERY 12 HOURS PRN
Status: DISCONTINUED | OUTPATIENT
Start: 2024-06-03 | End: 2024-06-04 | Stop reason: HOSPADM

## 2024-06-03 RX ADMIN — HYDROCODONE BITARTRATE AND ACETAMINOPHEN 1 TABLET: 5; 325 TABLET ORAL at 11:06

## 2024-06-03 RX ADMIN — HEPARIN SODIUM 18 UNITS/KG/HR: 10000 INJECTION, SOLUTION INTRAVENOUS at 03:06

## 2024-06-03 RX ADMIN — HEPARIN SODIUM 21 UNITS/KG/HR: 10000 INJECTION, SOLUTION INTRAVENOUS at 10:06

## 2024-06-03 NOTE — NURSING
Called ER for report and was informed that the nurse was with another patient and will return my call for report.

## 2024-06-03 NOTE — ASSESSMENT & PLAN NOTE
Chronic, controlled. Latest blood pressure and vitals reviewed-     Temp:  [97.4 °F (36.3 °C)]   Pulse:  [63-66]   Resp:  [18]   BP: (122-147)/(59-77)   SpO2:  [90 %-100 %] .   Home meds for hypertension were reviewed and noted below.   Hypertension Medications               lisinopriL 10 MG tablet Take 10 mg by mouth once daily.            While in the hospital, will manage blood pressure as follows; Continue home antihypertensive regimen    Will utilize p.r.n. blood pressure medication only if patient's blood pressure greater than 180/110 and she develops symptoms such as worsening chest pain or shortness of breath.

## 2024-06-03 NOTE — HPI
89-year-old female with PMH of hypertension, hyperlipidemia, recent SBO s/p exploratory laparotomy and lysis of adhesions 2 weeks ago sent from surgery clinic with concern for DVT.  Reports right lower extremity swelling for about a week.  Currently able to ambulate without any assistive devices.  Denies any chest pain or shortness of breath.  Vital signs stable in the ER.  Per ER provider, cardiology recommended admission, starting heparin drip and if tolerating anticoagulation transitioned to p.o. tomorrow.  Granddaughter at bedside assists with history.

## 2024-06-03 NOTE — PHARMACY MED REC
"        [facility]           Pharmacy  Admission Medication History     The home medication history was taken by Mary Blackburn.      Medication history obtained from Medications listed below were obtained from: Patient/family    Based on information gathered for medication list, you may go to "Admission" then "Reconcile Home Medications" tabs to review and/or act upon those items.     The home medication list has been updated by the Pharmacy department.   Please read ALL comments highlighted in yellow.   Please address this information as you see fit.    Feel free to contact us if you have any questions or require assistance.        No current facility-administered medications on file prior to encounter.     Current Outpatient Medications on File Prior to Encounter   Medication Sig Dispense Refill    atorvastatin (LIPITOR) 20 MG tablet Take 20 mg by mouth once daily.      lisinopriL 10 MG tablet Take 10 mg by mouth once daily.      primidone (MYSOLINE) 50 MG Tab TAKE 2 TABLETS EVERY EVENING 180 tablet 3    sertraline (ZOLOFT) 25 MG tablet Take 25 mg by mouth once daily.      vitamin D (VITAMIN D3) 1000 units Tab Take 1,000 Units by mouth once daily.      walker Misc 1 each by Misc.(Non-Drug; Combo Route) route as needed. 1 each 0       Please address this information as you see fit.  Feel free to contact us if you have any questions or require assistance.    Mary Blackburn  [phone]          .          "

## 2024-06-03 NOTE — ED PROVIDER NOTES
Encounter Date: 6/3/2024       History     Chief Complaint   Patient presents with    MD referral     Patient sent to ED by Dr. Foster due to concerns for blood clot. Patient had surgery bowel obstruction x2 weeks ago and is now having swelling to right lower leg. Denies blood thinner use. Denies chest pain, but family reports patient is usually slightly SOB.       Patient is a 89-year-old female who presents to the ED with complaint of right lower extremity swelling.  Patient recently had a exploratory laparotomy with Dr. Foster  on 05/20/2024.  Since that time the patient has been having swelling to the right lower extremity in the area of the gastrocnemius musculature.  She denies any pain or redness or other skin changes associated with this.  She denies any trauma.  Patient denies any chest pain or shortness of breath.  Patient denies any use of blood thinning medications.  Patient was evaluated by her general surgeon   postoperatively today and was instructed to come to the ED for evaluation possible DVT to the right lower extremity.  She denies any other risk factors for venous thromboembolism other than the recent aforementioned surgery.  She denies any history of PE or DVT in the past.  She denies a history of heart failure.      Review of patient's allergies indicates:   Allergen Reactions    Iodine and iodide containing products      Past Medical History:   Diagnosis Date    High cholesterol     Hypertension      Past Surgical History:   Procedure Laterality Date    BACK SURGERY      CHOLECYSTECTOMY      HYSTERECTOMY      LAPAROTOMY, EXPLORATORY N/A 5/20/2024    Procedure: LAPAROTOMY, EXPLORATORY;  Surgeon: Meño Foster MD;  [facility]: [facility];  Service: General;  Laterality: N/A;    NECK SURGERY       No family history on file.  Social History     Tobacco Use    Smoking status: Never   Substance Use Topics    Alcohol use: No    Drug use: No     Review of Systems   Constitutional:  Negative for  chills and fatigue.   HENT:  Negative for sinus pressure and sneezing.    Respiratory:  Negative for cough, chest tightness and shortness of breath.    Cardiovascular:  Positive for leg swelling. Negative for chest pain and palpitations.   Gastrointestinal:  Negative for abdominal pain, nausea and vomiting.   Musculoskeletal:  Negative for back pain and joint swelling.   Neurological:  Negative for dizziness, seizures, speech difficulty and headaches.       Physical Exam     Initial Vitals [06/03/24 1039]   BP Pulse Resp Temp SpO2   (!) 133/59 66 18 97.4 °F (36.3 °C) (!) 90 %      MAP       --         Physical Exam    Nursing note and vitals reviewed.  Constitutional: She appears well-developed and well-nourished.   HENT:   Head: Normocephalic and atraumatic.   Right Ear: External ear normal.   Left Ear: External ear normal.   Eyes: EOM are normal. Pupils are equal, round, and reactive to light.   Neck: Neck supple.   Normal range of motion.  Cardiovascular:  Normal rate, regular rhythm, normal heart sounds and intact distal pulses.           Pulmonary/Chest: Breath sounds normal.   Musculoskeletal:      Cervical back: Normal range of motion and neck supple.      Comments:   The right lower extremity  more significantly swollen compared to the left lower extremity; there is no erythema or palpable cords; negative Tania sign to the right lower extremity; dorsalis pedis pulse on the right 2+     Neurological: She is alert and oriented to person, place, and time. She has normal strength. GCS score is 15. GCS eye subscore is 4. GCS verbal subscore is 5. GCS motor subscore is 6.   Skin: Skin is warm. No rash noted. No erythema.         ED Course   Procedures  Labs Reviewed   CBC W/ AUTO DIFFERENTIAL   COMPREHENSIVE METABOLIC PANEL   PROTIME-INR          Imaging Results               US Lower Extremity Veins Bilateral (Final result)  Result time 06/03/24 12:30:03      Final result by Jose Li MD (06/03/24  12:30:03)                   Impression:      This report was flagged in Epic as abnormal.    Thrombus within the right distal common femoral vein extending throughout the femoral vein and popliteal vein.    No deep venous thrombosis on the left.      Electronically signed by: Jose Li MD  Date:    06/03/2024  Time:    12:30               Narrative:    EXAMINATION:  US LOWER EXTREMITY VEINS BILATERAL    CLINICAL HISTORY:  Other specified soft tissue disorders    TECHNIQUE:  Duplex and color flow Doppler and dynamic compression was performed of the bilateral lower extremity veins was performed.    COMPARISON:  None    FINDINGS:  There is thrombus within the proximal aspect of the right common femoral vein extending to the mid and distal aspects.  The right common femoral vein is patent.  There is thrombus within the right popliteal vein.  Otherwise, Duplex and color flow Doppler evaluation does not reveal any evidence of acute venous thrombosis in the  greater saphenous, peroneal, anterior tibial and posterior tibial veins of the right lower extremity.  There is no reflux to suggest valvular incompetence.    Duplex and color flow Doppler evaluation does not reveal any evidence of acute venous thrombosis in the common femoral, superficial femoral, greater saphenous, popliteal, peroneal, anterior tibial and posterior tibial veins of the left lower extremity.  There is no reflux to suggest valvular incompetence.                                       Medications - No data to display  Medical Decision Making  Amount and/or Complexity of Data Reviewed  Labs: ordered.  Radiology:  Decision-making details documented in ED Course.               ED Course as of 06/03/24 1344   Mon Jun 03, 2024   1233 US Lower Extremity Veins Bilateral(!)  Thrombus within the right distal common femoral vein extending throughout the femoral vein and popliteal vein [LC]   1256 Spoke to the on-call cardiologist, Dr. Serrano, who  recommends starting on heparin to monitor for bleeding, then transitioning to oral anti-coagulants in light of her recent surgery, age.  [LC]   1310  Spoke to the on-call [facility] hospitalist, Dr. Law,  who has agreed to admit the patient for admission, heparin infusion for deep vein thrombosis of the right lower extremity. [LC]      ED Course User Index  [LC] Sudheer Solitario MD               Medical Decision Making:   Initial Assessment:   See HPI   Clinical Tests:   Lab Tests: Ordered  Radiological Study: Reviewed  ED Management:  - pt with DVT of the proximal R common femoral, R popliteal veins   Other:   I have discussed this case with another health care provider.       <> Summary of the Discussion: Case discussed with on-call cardiologist, Dr. Serrano, who recommends starting pt on heparin, admission in light of recent surgery, age.    Case discussed with on-call [facility] Hospitalist, Dr. Law, who will admit patient to her service for heparin infusion, continued evaluation and workup              Clinical Impression:  Final diagnoses:  [M79.89] Swelling of right lower extremity  [I82.411] Acute deep vein thrombosis (DVT) of femoral vein of right lower extremity (Primary)  [I82.431] Acute deep vein thrombosis (DVT) of popliteal vein of right lower extremity          ED Disposition Condition    Observation                 Sudheer Solitario MD  06/03/24 8104       Sudheer Solitario MD  06/03/24 1402

## 2024-06-03 NOTE — ASSESSMENT & PLAN NOTE
Provoked R LE DVT in setting of recent surgery   Heparin drip, transition to po if able to tolerate AC

## 2024-06-03 NOTE — PROGRESS NOTES
06/03/24 1631   Admission   Communication Issues? Patient Hearing;Patient Vision   Type of Frequent Check   Type Patient Rounds;Telemetry Monitoring   Safety/Activity   Patient Rounds bed in low position;placement of personal items at bedside;call light in patient/parent reach;toileting offered;clutter free environment maintained;visualized patient   Safety Promotion/Fall Prevention assistive device/personal item within reach;family to remain at bedside;Fall Risk reviewed with patient/family;side rails raised x 2;instructed to call staff for mobility   Positioning   Head of Bed (HOB) Positioning HOB at 45 degrees     VN cued into patient's room for introduction with patient's and grandaughters permission.  VN role explained and informed patient and grandaughter that VN would be working with bedside nurse and rest of care team.  Plan of care reviewed. Fall risk and bed alarm protocol education provided.  Instructed patient to call for assistance.    Patient's chart, labs and vital signs reviewed.  Allowed time for questions.  No acute distress noted.  Will continue to be available as needed.

## 2024-06-03 NOTE — H&P
[facility] Medicine  History & Physical    Patient Name: Alexandra Gleason  MRN: [id number]  Patient Class: OP- Observation  Admission Date: 6/3/2024  Attending Physician: Siri Law MD   Primary Care Provider: Carla Butler MD         Patient information was obtained from patient, relative(s), past medical records, and ER records.     Subjective:     Principal Problem:Acute deep vein thrombosis (DVT) of femoral vein of right lower extremity    Chief Complaint:   Chief Complaint   Patient presents with    MD referral     Patient sent to ED by Dr. Foster due to concerns for blood clot. Patient had surgery bowel obstruction x2 weeks ago and is now having swelling to right lower leg. Denies blood thinner use. Denies chest pain, but family reports patient is usually slightly SOB.         HPI: 89-year-old female with PMH of hypertension, hyperlipidemia, recent SBO s/p exploratory laparotomy and lysis of adhesions 2 weeks ago sent from surgery clinic with concern for DVT.  Reports right lower extremity swelling for about a week.  Currently able to ambulate without any assistive devices.  Denies any chest pain or shortness of breath.  Vital signs stable in the ER.  Per ER provider, cardiology recommended admission, starting heparin drip and if tolerating anticoagulation transitioned to p.o. tomorrow.  Granddaughter at bedside assists with history.    Past Medical History:   Diagnosis Date    High cholesterol     Hypertension        Past Surgical History:   Procedure Laterality Date    BACK SURGERY      CHOLECYSTECTOMY      HYSTERECTOMY      LAPAROTOMY, EXPLORATORY N/A 5/20/2024    Procedure: LAPAROTOMY, EXPLORATORY;  Surgeon: Meño Foster MD;  Location: [facility] OR;  Service: General;  Laterality: N/A;    NECK SURGERY         Review of patient's allergies indicates:   Allergen Reactions    Iodine and iodide containing products        No current facility-administered medications on file prior  to encounter.     Current Outpatient Medications on File Prior to Encounter   Medication Sig    atorvastatin (LIPITOR) 20 MG tablet Take 20 mg by mouth once daily.    lisinopriL 10 MG tablet Take 10 mg by mouth once daily.    primidone (MYSOLINE) 50 MG Tab TAKE 2 TABLETS EVERY EVENING    sertraline (ZOLOFT) 25 MG tablet Take 25 mg by mouth once daily.    vitamin D (VITAMIN D3) 1000 units Tab Take 1,000 Units by mouth once daily.    walker Misc 1 each by Misc.(Non-Drug; Combo Route) route as needed.     Family History    None       Tobacco Use    Smoking status: Never    Smokeless tobacco: Not on file   Substance and Sexual Activity    Alcohol use: No    Drug use: No    Sexual activity: Not on file     Review of Systems   Constitutional:  Negative for appetite change and fatigue.   HENT:  Negative for congestion.    Respiratory:  Negative for cough and shortness of breath.    Cardiovascular:  Positive for leg swelling (R leg). Negative for chest pain.   Gastrointestinal:  Negative for abdominal pain, diarrhea, nausea and vomiting.   Neurological:  Negative for light-headedness and headaches.   Psychiatric/Behavioral:  Negative for confusion.      Objective:     Vital Signs (Most Recent):  Temp: 97.4 °F (36.3 °C) (06/03/24 1039)  Pulse: 66 (06/03/24 1611)  Resp: 18 (06/03/24 1039)  BP: (!) 147/67 (06/03/24 1353)  SpO2: 100 % (06/03/24 1041) Vital Signs (24h Range):  Temp:  [97.4 °F (36.3 °C)] 97.4 °F (36.3 °C)  Pulse:  [63-66] 66  Resp:  [18] 18  SpO2:  [90 %-100 %] 100 %  BP: (122-147)/(59-77) 147/67     Weight: 49.4 kg (109 lb)  Body mass index is 22.78 kg/m².     Physical Exam  Vitals and nursing note reviewed.   Constitutional:       General: She is not in acute distress.     Comments: Elderly female   Cardiovascular:      Rate and Rhythm: Normal rate and regular rhythm.      Heart sounds: Normal heart sounds. No murmur heard.  Pulmonary:      Effort: Pulmonary effort is normal. No respiratory distress.       Breath sounds: No wheezing.   Abdominal:      Palpations: Abdomen is soft.      Tenderness: There is no abdominal tenderness.   Musculoskeletal:      Right lower leg: Edema present.      Left lower leg: No edema.   Skin:     General: Skin is warm and dry.      Findings: No bruising or rash.   Neurological:      General: No focal deficit present.      Mental Status: She is alert and oriented to person, place, and time.                Significant Labs: All pertinent labs within the past 24 hours have been reviewed.    Significant Imaging: I have reviewed all pertinent imaging results/findings within the past 24 hours.  Assessment/Plan:     * Acute deep vein thrombosis (DVT) of femoral vein of right lower extremity  Provoked R LE DVT in setting of recent surgery   Heparin drip, transition to po if able to tolerate AC      Goals of care, counseling/discussion  Advance Care Planning    Confirmed with patient, she is a full code.          SBO (small bowel obstruction)  2 weeks s/p exploratory laparotomy and lysis of adhesions  No acute concerns     Stage 3a chronic kidney disease  Creatine stable for now. BMP reviewed- noted Estimated Creatinine Clearance: 34.8 mL/min (based on SCr of 0.8 mg/dL). according to latest data. Based on current GFR, CKD stage is stage 3 - GFR 30-59.  Monitor UOP and serial BMP and adjust therapy as needed. Renally dose meds. Avoid nephrotoxic medications and procedures.    Hyperlipidemia  Continue statin       HTN (hypertension)  Chronic, controlled. Latest blood pressure and vitals reviewed-     Temp:  [97.4 °F (36.3 °C)]   Pulse:  [63-66]   Resp:  [18]   BP: (122-147)/(59-77)   SpO2:  [90 %-100 %] .   Home meds for hypertension were reviewed and noted below.   Hypertension Medications               lisinopriL 10 MG tablet Take 10 mg by mouth once daily.            While in the hospital, will manage blood pressure as follows; Continue home antihypertensive regimen    Will utilize p.r.n. blood  pressure medication only if patient's blood pressure greater than 180/110 and she develops symptoms such as worsening chest pain or shortness of breath.      VTE Risk Mitigation (From admission, onward)           Ordered     heparin 25,000 units in dextrose 5% (100 units/ml) IV bolus from bag HIGH INTENSITY nomogram - [facility]  As needed (PRN)        Question:  Heparin Infusion Adjustment (DO NOT MODIFY ANSWER)  Answer:  [email or web address]\epic\Images\Pharmacy\HeparinInfusions\heparin HIGH INTENSITY nomogram for [facility] [id number]    06/03/24 1523     heparin 25,000 units in dextrose 5% (100 units/ml) IV bolus from bag HIGH INTENSITY nomogram - [facility]  As needed (PRN)        Question:  Heparin Infusion Adjustment (DO NOT MODIFY ANSWER)  Answer:  \\[email or web address]\epic\Images\Pharmacy\HeparinInfusions\heparin HIGH INTENSITY nomogram for [facility] [id number].pdf    06/03/24 1523     heparin 25,000 units in dextrose 5% 250 mL (100 units/mL) infusion HIGH INTENSITY nomogram - [facility]  Continuous        Question:  Begin at (units/kg/hr)  Answer:  18    06/03/24 1523     IP VTE HIGH RISK PATIENT  Once         06/03/24 1522     Place sequential compression device  Until discontinued         06/03/24 1522     Reason for No Pharmacological VTE Prophylaxis  Once        Question:  Reasons:  Answer:  Physician Provided (leave comment)  Comment:  therapeutic AC    06/03/24 1522                       On 06/03/2024, patient should be placed in hospital observation services under my care.      Siri Law MD  Department of Hospital Medicine  [facility]

## 2024-06-03 NOTE — SUBJECTIVE & OBJECTIVE
Past Medical History:   Diagnosis Date    High cholesterol     Hypertension        Past Surgical History:   Procedure Laterality Date    BACK SURGERY      CHOLECYSTECTOMY      HYSTERECTOMY      LAPAROTOMY, EXPLORATORY N/A 5/20/2024    Procedure: LAPAROTOMY, EXPLORATORY;  Surgeon: Meño Foster MD;  Location: [facility];  Service: General;  Laterality: N/A;    NECK SURGERY         Review of patient's allergies indicates:   Allergen Reactions    Iodine and iodide containing products        No current facility-administered medications on file prior to encounter.     Current Outpatient Medications on File Prior to Encounter   Medication Sig    atorvastatin (LIPITOR) 20 MG tablet Take 20 mg by mouth once daily.    lisinopriL 10 MG tablet Take 10 mg by mouth once daily.    primidone (MYSOLINE) 50 MG Tab TAKE 2 TABLETS EVERY EVENING    sertraline (ZOLOFT) 25 MG tablet Take 25 mg by mouth once daily.    vitamin D (VITAMIN D3) 1000 units Tab Take 1,000 Units by mouth once daily.    walker Misc 1 each by Misc.(Non-Drug; Combo Route) route as needed.     Family History    None       Tobacco Use    Smoking status: Never    Smokeless tobacco: Not on file   Substance and Sexual Activity    Alcohol use: No    Drug use: No    Sexual activity: Not on file     Review of Systems   Constitutional:  Negative for appetite change and fatigue.   HENT:  Negative for congestion.    Respiratory:  Negative for cough and shortness of breath.    Cardiovascular:  Positive for leg swelling (R leg). Negative for chest pain.   Gastrointestinal:  Negative for abdominal pain, diarrhea, nausea and vomiting.   Neurological:  Negative for light-headedness and headaches.   Psychiatric/Behavioral:  Negative for confusion.      Objective:     Vital Signs (Most Recent):  Temp: 97.4 °F (36.3 °C) (06/03/24 1039)  Pulse: 66 (06/03/24 1611)  Resp: 18 (06/03/24 1039)  BP: (!) 147/67 (06/03/24 1353)  SpO2: 100 % (06/03/24 1041) Vital Signs (24h Range):  Temp:   [97.4 °F (36.3 °C)] 97.4 °F (36.3 °C)  Pulse:  [63-66] 66  Resp:  [18] 18  SpO2:  [90 %-100 %] 100 %  BP: (122-147)/(59-77) 147/67     Weight: 49.4 kg (109 lb)  Body mass index is 22.78 kg/m².     Physical Exam  Vitals and nursing note reviewed.   Constitutional:       General: She is not in acute distress.     Comments: Elderly female   Cardiovascular:      Rate and Rhythm: Normal rate and regular rhythm.      Heart sounds: Normal heart sounds. No murmur heard.  Pulmonary:      Effort: Pulmonary effort is normal. No respiratory distress.      Breath sounds: No wheezing.   Abdominal:      Palpations: Abdomen is soft.      Tenderness: There is no abdominal tenderness.   Musculoskeletal:      Right lower leg: Edema present.      Left lower leg: No edema.   Skin:     General: Skin is warm and dry.      Findings: No bruising or rash.   Neurological:      General: No focal deficit present.      Mental Status: She is alert and oriented to person, place, and time.                Significant Labs: All pertinent labs within the past 24 hours have been reviewed.    Significant Imaging: I have reviewed all pertinent imaging results/findings within the past 24 hours.

## 2024-06-03 NOTE — PROGRESS NOTES
General Surgery Clinic  Progress Note    SUBJECTIVE:   No chief complaint on file.    History of Present Illness:  Alexandra Gleason is a 89 y.o. female is an established patient of this practice who presents today for follow up visit. She is 2 weeks s/p exploratory laparotomy and lysis of adhesions. She has been doing well since discharge. She is tolerating her diet well and having regular bowel function. No issues with her wound. Otherwise feeling well.     Review of patient's allergies indicates:   Allergen Reactions    Iodine and iodide containing products        Current Outpatient Medications   Medication Sig Dispense Refill    atorvastatin (LIPITOR) 20 MG tablet Take 20 mg by mouth once daily.      lisinopriL 10 MG tablet lisinopriL 10 mg tablet, [RxNorm: 380027]      primidone (MYSOLINE) 50 MG Tab TAKE 2 TABLETS EVERY EVENING 180 tablet 3    walker Misc 1 each by Misc.(Non-Drug; Combo Route) route as needed. 1 each 0     No current facility-administered medications for this visit.       Past Medical History:   Diagnosis Date    High cholesterol     Hypertension      Past Surgical History:   Procedure Laterality Date    BACK SURGERY      CHOLECYSTECTOMY      HYSTERECTOMY      LAPAROTOMY, EXPLORATORY N/A 5/20/2024    Procedure: LAPAROTOMY, EXPLORATORY;  Surgeon: Meño Foster MD;  Location: [facility];  Service: General;  Laterality: N/A;    NECK SURGERY       No family history on file.  Social History     Tobacco Use    Smoking status: Never   Substance Use Topics    Alcohol use: No    Drug use: No        Review of Systems:  Review of Systems   Constitutional:  Negative for chills and fever.   Respiratory:  Negative for cough and shortness of breath.    Cardiovascular:  Negative for chest pain and palpitations.   Gastrointestinal:  Negative for abdominal pain, nausea and vomiting.   Genitourinary:  Negative for dysuria.   Neurological:  Negative for dizziness and light-headedness.       OBJECTIVE:  "    Vital Signs (Most Recent)  Pulse: 63 (06/03/24 0959)  BP: 122/77 (06/03/24 0959)  4' 10" (1.473 m)  49.5 kg (109 lb 2 oz)   Physical Exam:  Physical Exam  Vitals reviewed.   Constitutional:       Appearance: Normal appearance.   Cardiovascular:      Rate and Rhythm: Normal rate.      Pulses: Normal pulses.   Pulmonary:      Effort: Pulmonary effort is normal.   Abdominal:      General: There is no distension.      Palpations: Abdomen is soft. There is no mass.      Tenderness: There is no abdominal tenderness. There is no guarding.      Comments: Incision healing well. Staples removed   Musculoskeletal:         General: Swelling present.      Comments: RLE swelling   Skin:     General: Skin is warm and dry.   Neurological:      General: No focal deficit present.      Mental Status: She is alert and oriented to person, place, and time.       Laboratory  Available labs reviewed.    Diagnostic Results:  Available imaging and diagnostic studies reviewed.    ASSESSMENT/PLAN:     89 y.o. female s/p exploratory laparotomy and lysis of adhesions for mechanical SBO on 5/20/24. Progressing appropriately aside from some RLE swelling. Staples from midline incision removed.    PLAN:  Advised to go to the ED for her lower extremity swelling to rule out DVT  Resume normal bathing habits  Return to clinic as needed    Rigo Lagunas MD  [facility] PGY3    "

## 2024-06-04 ENCOUNTER — TELEPHONE (OUTPATIENT)
Dept: INTERNAL MEDICINE | Facility: CLINIC | Age: 89
End: 2024-06-04
Payer: MEDICARE

## 2024-06-04 VITALS
HEART RATE: 63 BPM | TEMPERATURE: 98 F | SYSTOLIC BLOOD PRESSURE: 119 MMHG | DIASTOLIC BLOOD PRESSURE: 56 MMHG | WEIGHT: 122.38 LBS | OXYGEN SATURATION: 100 % | HEIGHT: 58 IN | RESPIRATION RATE: 17 BRPM | BODY MASS INDEX: 25.69 KG/M2

## 2024-06-04 LAB
APTT PPP: 76.8 SEC (ref 21–32)
BASOPHILS # BLD AUTO: 0.12 K/UL (ref 0–0.2)
BASOPHILS NFR BLD: 1.8 % (ref 0–1.9)
DIFFERENTIAL METHOD BLD: ABNORMAL
EOSINOPHIL # BLD AUTO: 0.2 K/UL (ref 0–0.5)
EOSINOPHIL NFR BLD: 3.6 % (ref 0–8)
ERYTHROCYTE [DISTWIDTH] IN BLOOD BY AUTOMATED COUNT: 13.6 % (ref 11.5–14.5)
HCT VFR BLD AUTO: 34.9 % (ref 37–48.5)
HGB BLD-MCNC: 11.5 G/DL (ref 12–16)
IMM GRANULOCYTES # BLD AUTO: 0.02 K/UL (ref 0–0.04)
IMM GRANULOCYTES NFR BLD AUTO: 0.3 % (ref 0–0.5)
LYMPHOCYTES # BLD AUTO: 1.9 K/UL (ref 1–4.8)
LYMPHOCYTES NFR BLD: 28.8 % (ref 18–48)
MCH RBC QN AUTO: 29.3 PG (ref 27–31)
MCHC RBC AUTO-ENTMCNC: 33 G/DL (ref 32–36)
MCV RBC AUTO: 89 FL (ref 82–98)
MONOCYTES # BLD AUTO: 0.8 K/UL (ref 0.3–1)
MONOCYTES NFR BLD: 11.6 % (ref 4–15)
NEUTROPHILS # BLD AUTO: 3.6 K/UL (ref 1.8–7.7)
NEUTROPHILS NFR BLD: 53.9 % (ref 38–73)
NRBC BLD-RTO: 0 /100 WBC
PLATELET # BLD AUTO: 361 K/UL (ref 150–450)
PMV BLD AUTO: 9.9 FL (ref 9.2–12.9)
RBC # BLD AUTO: 3.93 M/UL (ref 4–5.4)
WBC # BLD AUTO: 6.7 K/UL (ref 3.9–12.7)

## 2024-06-04 PROCEDURE — 96365 THER/PROPH/DIAG IV INF INIT: CPT | Mod: 59

## 2024-06-04 PROCEDURE — 36415 COLL VENOUS BLD VENIPUNCTURE: CPT | Mod: HCNC | Performed by: STUDENT IN AN ORGANIZED HEALTH CARE EDUCATION/TRAINING PROGRAM

## 2024-06-04 PROCEDURE — 96366 THER/PROPH/DIAG IV INF ADDON: CPT

## 2024-06-04 PROCEDURE — 63600175 PHARM REV CODE 636 W HCPCS: Mod: HCNC | Performed by: STUDENT IN AN ORGANIZED HEALTH CARE EDUCATION/TRAINING PROGRAM

## 2024-06-04 PROCEDURE — 85730 THROMBOPLASTIN TIME PARTIAL: CPT | Mod: HCNC | Performed by: STUDENT IN AN ORGANIZED HEALTH CARE EDUCATION/TRAINING PROGRAM

## 2024-06-04 PROCEDURE — 25000003 PHARM REV CODE 250: Mod: HCNC | Performed by: STUDENT IN AN ORGANIZED HEALTH CARE EDUCATION/TRAINING PROGRAM

## 2024-06-04 PROCEDURE — G0378 HOSPITAL OBSERVATION PER HR: HCPCS | Mod: HCNC

## 2024-06-04 PROCEDURE — C1751 CATH, INF, PER/CENT/MIDLINE: HCPCS | Mod: HCNC

## 2024-06-04 PROCEDURE — 25000003 PHARM REV CODE 250: Mod: HCNC | Performed by: HOSPITALIST

## 2024-06-04 PROCEDURE — 99204 OFFICE O/P NEW MOD 45 MIN: CPT | Mod: HCNC,,, | Performed by: NURSE PRACTITIONER

## 2024-06-04 PROCEDURE — 85025 COMPLETE CBC W/AUTO DIFF WBC: CPT | Mod: HCNC | Performed by: STUDENT IN AN ORGANIZED HEALTH CARE EDUCATION/TRAINING PROGRAM

## 2024-06-04 PROCEDURE — 36410 VNPNXR 3YR/> PHY/QHP DX/THER: CPT | Mod: HCNC

## 2024-06-04 PROCEDURE — 76937 US GUIDE VASCULAR ACCESS: CPT | Mod: HCNC

## 2024-06-04 RX ADMIN — ATORVASTATIN CALCIUM 20 MG: 20 TABLET, FILM COATED ORAL at 08:06

## 2024-06-04 RX ADMIN — APIXABAN 10 MG: 5 TABLET, FILM COATED ORAL at 08:06

## 2024-06-04 RX ADMIN — HEPARIN SODIUM 19 UNITS/KG/HR: 10000 INJECTION, SOLUTION INTRAVENOUS at 05:06

## 2024-06-04 RX ADMIN — LISINOPRIL 10 MG: 10 TABLET ORAL at 08:06

## 2024-06-04 RX ADMIN — SERTRALINE HYDROCHLORIDE 25 MG: 25 TABLET ORAL at 08:06

## 2024-06-04 RX ADMIN — CHOLECALCIFEROL TAB 25 MCG (1000 UNIT) 1000 UNITS: 25 TAB at 08:06

## 2024-06-04 NOTE — CONSULTS
[place] [facility] Surg  Cardiology  Consult Note    Patient Name: Alexandra Gleason  MRN: [id number]  Admission Date: 6/3/2024  Hospital Length of Stay: 0 days  Code Status: Full Code   Attending Provider: Jake London MD   Consulting Provider: RACQUEL Byrne ANP  Primary Care Physician: Carla Butler MD  Principal Problem:Acute deep vein thrombosis (DVT) of femoral vein of right lower extremity    Patient information was obtained from patient, relative(s), past medical records, and ER records.     Inpatient consult to Cardiology[facility]  Consult performed by: Briana Alvarado APRN, MARIANNA  Consult ordered by: Siri Law MD  Reason for consult: RLE DVT        Subjective:     Chief Complaint:  RLE swelling     HPI:   89yo female with CKD stage III, acute RLE DVT, SBO s/p surgery 2 weeks ago, HTN, HLP and CAD who presented to the ER upon advice of General surgery clinic. She underwent surgery 2 weeks ago for SBO and was seen yesterday by Dr. Foster. She complained of swelling to her RLE that was not improving and was sent to the ER for evaluation due to concern for DVT. She denies any SOB,chest pain,palpitations or syncope. Labs CBC and BMP WNL. BLE venous ultrasound with DVT to right distal CFV, femoral vein and popliteal vein with no DVT to LLE. Started on IV Heparin and admitted Hospital Medicine. Cardiology consulted for evaluation of DVT     Past Medical History:   Diagnosis Date    High cholesterol     Hypertension        Past Surgical History:   Procedure Laterality Date    BACK SURGERY      CHOLECYSTECTOMY      HYSTERECTOMY      LAPAROTOMY, EXPLORATORY N/A 5/20/2024    Procedure: LAPAROTOMY, EXPLORATORY;  Surgeon: Meño Foster MD;  Location: [facility] OR;  Service: General;  Laterality: N/A;    NECK SURGERY         Review of patient's allergies indicates:   Allergen Reactions    Iodine and iodide containing products        No current facility-administered medications on file prior to  encounter.     Current Outpatient Medications on File Prior to Encounter   Medication Sig    atorvastatin (LIPITOR) 20 MG tablet Take 20 mg by mouth once daily.    lisinopriL 10 MG tablet Take 10 mg by mouth once daily.    primidone (MYSOLINE) 50 MG Tab TAKE 2 TABLETS EVERY EVENING    sertraline (ZOLOFT) 25 MG tablet Take 25 mg by mouth once daily.    vitamin D (VITAMIN D3) 1000 units Tab Take 1,000 Units by mouth once daily.    walker Misc 1 each by Misc.(Non-Drug; Combo Route) route as needed.     Family History    None       Tobacco Use    Smoking status: Never    Smokeless tobacco: Not on file   Substance and Sexual Activity    Alcohol use: No    Drug use: No    Sexual activity: Not on file     Review of Systems   Constitutional: Negative for chills, decreased appetite, diaphoresis, fever, malaise/fatigue, weight gain and weight loss.   Cardiovascular:  Positive for leg swelling. Negative for chest pain, claudication, dyspnea on exertion, irregular heartbeat, near-syncope, orthopnea, palpitations and paroxysmal nocturnal dyspnea.   Respiratory:  Negative for cough, shortness of breath, snoring, sputum production and wheezing.    Endocrine: Negative for cold intolerance, heat intolerance, polydipsia, polyphagia and polyuria.   Skin:  Negative for color change, dry skin, itching, nail changes and poor wound healing.   Musculoskeletal:  Negative for back pain, gout, joint pain and joint swelling.   Gastrointestinal:  Negative for bloating, abdominal pain, constipation, diarrhea, hematemesis, hematochezia, melena, nausea and vomiting.   Genitourinary:  Negative for dysuria, hematuria and nocturia.   Neurological:  Negative for dizziness, headaches, light-headedness, numbness, paresthesias and weakness.   Psychiatric/Behavioral:  Negative for altered mental status, depression and memory loss.      Objective:     Vital Signs (Most Recent):  Temp: 98.2 °F (36.8 °C) (06/04/24 0727)  Pulse: (!) 58 (06/04/24 0727)  Resp:  17 (06/04/24 0727)  BP: (!) 116/56 (06/04/24 0727)  SpO2: 100 % (06/04/24 0727) Vital Signs (24h Range):  Temp:  [97.4 °F (36.3 °C)-98.2 °F (36.8 °C)] 98.2 °F (36.8 °C)  Pulse:  [50-66] 58  Resp:  [17-20] 17  SpO2:  [90 %-100 %] 100 %  BP: (111-147)/(56-77) 116/56     Weight: 55.5 kg (122 lb 5.7 oz)  Body mass index is 25.57 kg/m².    SpO2: 100 %         Intake/Output Summary (Last 24 hours) at 6/4/2024 0952  Last data filed at 6/4/2024 0730  Gross per 24 hour   Intake 305.08 ml   Output --   Net 305.08 ml       Lines/Drains/Airways       Peripheral Intravenous Line  Duration                  Midline Catheter - Single Lumen 06/03/24 2350 Left basilic vein (medial side of arm) other (see comments) <1 day                     Physical Exam  Constitutional:       General: She is not in acute distress.     Appearance: She is well-developed.   Cardiovascular:      Rate and Rhythm: Normal rate and regular rhythm.      Heart sounds: No murmur heard.     No gallop.   Pulmonary:      Effort: Pulmonary effort is normal. No respiratory distress.      Breath sounds: Normal breath sounds. No wheezing.   Abdominal:      General: Bowel sounds are normal. There is no distension.      Palpations: Abdomen is soft.      Tenderness: There is no abdominal tenderness.   Musculoskeletal:      Right lower leg: No edema.      Left lower leg: No edema.   Skin:     General: Skin is warm and dry.   Neurological:      Mental Status: She is alert and oriented to person, place, and time.          Significant Labs: BMP:   Recent Labs   Lab 06/03/24  1408   GLU 83      K 4.0      CO2 21*   BUN 18   CREATININE 0.8   CALCIUM 9.2    and CBC   Recent Labs   Lab 06/03/24  1408 06/04/24  0508   WBC 8.23 6.70   HGB 11.7* 11.5*   HCT 34.6* 34.9*    361       Significant Imaging: Echocardiogram: Transthoracic echo (TTE) complete (Cupid Only):   Results for orders placed or performed during the hospital encounter of 05/17/24   Paola Brunner  Bubble? No; Ultrasound enhancing contrast? No   Result Value Ref Range    BSA 1.44 m2    LVOT stroke volume 49.00 cm3    LVIDd 2.97 (A) 3.5 - 6.0 cm    LV Systolic Volume 4.29 mL    LV Systolic Volume Index 3.0 mL/m2    LVIDs 1.32 (A) 2.1 - 4.0 cm    LV Diastolic Volume 34.04 mL    LV Diastolic Volume Index 23.80 mL/m2    IVS 0.60 0.6 - 1.1 cm    LVOT diameter 2.04 cm    LVOT area 3.3 cm2    FS 56 (A) 28 - 44 %    Left Ventricle Relative Wall Thickness 0.44 cm    Posterior Wall 0.65 0.6 - 1.1 cm    LV mass 41.33 g    LV Mass Index 29 g/m2    MV Peak E Raghav 0.58 m/s    TDI LATERAL 0.05 m/s    TDI SEPTAL 0.05 m/s    E/E' ratio 11.60 m/s    MV Peak A Raghav 0.83 m/s    TR Max Raghav 1.88 m/s    E/A ratio 0.70     E wave deceleration time 277.84 msec    LV SEPTAL E/E' RATIO 11.60 m/s    LV LATERAL E/E' RATIO 11.60 m/s    PV Peak S Raghav 0.34 m/s    PV Peak D Raghav 0.31 m/s    Pulm vein S/D ratio 1.10     LVOT peak raghav 1.11 m/s    Left Ventricular Outflow Tract Mean Velocity 0.65 cm/s    Left Ventricular Outflow Tract Mean Gradient 1.98 mmHg    RVDD 1.21 cm    RV S' 18.01 cm/s    TAPSE 1.61 cm    RV/LV Ratio 0.41 cm    LA size 2.12 cm    Left Atrium Major Axis 2.10 cm    LA volume (mod) 14.96 cm3    LA Volume Index (Mod) 10.5 mL/m2    RA Major Axis 3.03 cm    AV mean gradient 2 mmHg    AV peak gradient 5 mmHg    Ao peak raghav 1.13 m/s    Ao VTI 14.80 cm    LVOT peak VTI 15.00 cm    AV valve area 3.31 cm²    AV Velocity Ratio 0.98     AV index (prosthetic) 1.01     SHELBY by Velocity Ratio 3.21 cm²    MV mean gradient 1 mmHg    MV peak gradient 3 mmHg    MV stenosis pressure 1/2 time 71.10 ms    MV valve area p 1/2 method 3.09 cm2    MV valve area by continuity eq 2.40 cm2    MV VTI 20.4 cm    Triscuspid Valve Regurgitation Peak Gradient 14 mmHg    PV PEAK VELOCITY 0.20 m/s    PV peak gradient 0 mmHg    Pulmonary Valve Mean Velocity 0.17 m/s    IVC diameter 1.57 cm    Mean e' 0.05 m/s    ZLVIDS -5.69     ZLVIDD -3.86     Narrative       Left Ventricle: The left ventricle is normal in size. Normal wall   thickness. There is normal systolic function with a visually estimated   ejection fraction of 55 - 60%. Grade I diastolic dysfunction. Normal left   ventricular filling pressure.    Right Ventricle: Right ventricle was not well visualized due to poor   acoustic window. Normal right ventricular cavity size. Systolic function   is borderline low.       Assessment and Plan:     * Acute deep vein thrombosis (DVT) of femoral vein of right lower extremity  - RLE swelling with DVT to right distal CFV, FV and popliteal vein; started on IV Heparin with improvement in RLE swelling per patient and granddaughter; no significiant swelling appreciated on exam this AM   - recent surgery and immobilization felt to be enciting event  - RLE swelling improved and ambulating with no significant issues; agree with transition to Eliquis today on 10mg po BID x 7 days then anticipate 2.5mg po BID thereafter; feel suitable for discharge from cardiac standpoint  - discussed plan for continuation of OAC for at least 3-6 months; recommend outpatient follow up     HTN (hypertension)  - SBP 110s-130s  - on Lisinopril as an outpatient and continued while admitted  - goal BP less than 130/80  - BP well controlled; continue ACEI         VTE Risk Mitigation (From admission, onward)           Ordered     apixaban tablet 10 mg  2 times daily         06/04/24 0803     IP VTE HIGH RISK PATIENT  Once         06/03/24 1522     Reason for No Pharmacological VTE Prophylaxis  Once        Question:  Reasons:  Answer:  Physician Provided (leave comment)  Comment:  therapeutic AC    06/03/24 1522                    Thank you for your consult. I will sign off. Please contact us if you have any additional questions.    RACQUEL Byrne, ANP  Cardiology   [place] [facility] Surg

## 2024-06-04 NOTE — NURSING
RAPID RESPONSE NURSE PROACTIVE ROUNDING NOTE       Single lumen 20G, 2.25IN AccuCath placed in the right basilic vein. Needle advanced into the vessel under real time ultrasound guidance.    Max dwell date: 6/13/24    Lot number: [id number]

## 2024-06-04 NOTE — NURSING
"Pt complaing of 10/10 pain to right upper arm at IV site while receiving IV heparin. Pt also complaining of a headache and numbness to her left heel. Pedal pulses +2 bilaterally on palpation. Pt's right upper arm noted to be red and swollen. IV heparin infusion stopped. Dr Ocampo notified. New orders received. [facility] ICU called to place new IV with ultrasound. IV to right upper arm removed with catheter intact. Pt's right arm elevated with a pillow and cold compress applied. Pt states "my arm is feeling better" after compress applied. Pt denies any further needs at this time. Plan of care ongoing.   "

## 2024-06-04 NOTE — PLAN OF CARE
AOX4. VS stable. Safety maintained. Meds given per MAR. Pain treated with PRN meds. Denies N/V at this time.  Heparin infusing per orders. Cardiac diet maintained. Cardiac monitoring in place. Midline abdominal incision dry and intact, RUPA. Resting quietly. SR up X 2. Call light in reach. Bed alarm set. Pt denies any further needs at this time. Plan of care ongoing.       Problem: Adult Inpatient Plan of Care  Goal: Plan of Care Review  Outcome: Progressing  Goal: Patient-Specific Goal (Individualized)  Outcome: Progressing     Problem: Sepsis/Septic Shock  Goal: Absence of Infection Signs and Symptoms  Outcome: Progressing     Problem: Acute Kidney Injury/Impairment  Goal: Effective Renal Function  Outcome: Progressing     Problem: Wound  Goal: Optimal Pain Control and Function  Outcome: Progressing  Goal: Optimal Wound Healing  Outcome: Progressing     Problem: VTE (Venous Thromboembolism)  Goal: Tissue Perfusion  Outcome: Progressing

## 2024-06-04 NOTE — ASSESSMENT & PLAN NOTE
- RLE swelling with DVT to right distal CFV, FV and popliteal vein; started on IV Heparin with improvement in RLE swelling per patient and granddaughter; no significiant swelling appreciated on exam this AM   - recent surgery and immobilization felt to be enciting event  - RLE swelling improved and ambulating with no significant issues; agree with transition to Eliquis today on 10mg po BID x 7 days then anticipate 2.5mg po BID thereafter; feel suitable for discharge from cardiac standpoint  - discussed plan for continuation of OAC for at least 3-6 months; recommend outpatient follow up

## 2024-06-04 NOTE — HPI
89yo female with CKD stage III, acute RLE DVT, SBO s/p surgery 2 weeks ago, HTN, HLP and CAD who presented to the ER upon advice of General surgery clinic. She underwent surgery 2 weeks ago for SBO and was seen yesterday by Dr. Foster. She complained of swelling to her RLE that was not improving and was sent to the ER for evaluation due to concern for DVT. She denies any SOB,chest pain,palpitations or syncope. Labs CBC and BMP WNL. BLE venous ultrasound with DVT to right distal CFV, femoral vein and popliteal vein with no DVT to LLE. Started on IV Heparin and admitted Hospital Medicine. Cardiology consulted for evaluation of DVT

## 2024-06-04 NOTE — PLAN OF CARE
Discharge orders noted. AVS prepared with medication list, importance of medication compliance, follow up appointments, diet, home care instructions, treatment plan, self management, and when to seek medical attention. Detailed clinical reference list attached. AVS printed and handed to patient by bedside's grandaughter by  nurse. VN reviewed discharge instructions with patient's grandaughter using teachback method.  Allowed time for questions, all questions answered.  Patient's grandaughters  verbalized complete understanding of discharge instructions and voices no concerns.      Discharge instructions complete.  Bedside delivery complete.  Transport wheelchair requested.  Bedside nurse notified.

## 2024-06-04 NOTE — TELEPHONE ENCOUNTER
----- Message from Susana Preston sent at 6/4/2024 10:10 AM CDT -----  Regarding: HFU  Patient is being discharged from [facility] and is requiring a hospital follow up appointment with their Primary Care Provider in 7 days. Patient is established. I am unable to schedule an appointment in that time frame. Please schedule patient a sooner appointment and message me back so Discharge Nurse can advise patient prior to discharge.    Patient is discharging today.    DX:Acute deep vein thrombosis (DVT)       Thank you, Eliza  Physician Referral Specialist/Discharge

## 2024-06-04 NOTE — PLAN OF CARE
LAURA met with pt and pts two grandchildren at bedside. Information confirmed on chart. Pt resides in home with her daughter. Pt is independent in ADLs. Pt has no HH services. Pt has bsc and rw at home. Pt currently does not use any assistance devices to ambulate. Pt/family declined HH services. Pt has great support system. Pt is not on HD or coumadin. Pts grandchildren at bedside and will provide transport home. Family transports pt to and from all apts. LAURA will continue to follow pt throughout her transitions of care and assist with any dc needs.     LAURA requested PCP follow up.     Cleared from CM . Bedside Nurse and VN notified.     06/04/24 7755   Final Note   Assessment Type Final Discharge Note   Anticipated Discharge Disposition Home   Hospital Resources/Appts/Education Provided Appointments scheduled and added to AVS   Post-Acute Status   Discharge Delays None known at this time

## 2024-06-04 NOTE — SUBJECTIVE & OBJECTIVE
Past Medical History:   Diagnosis Date    High cholesterol     Hypertension        Past Surgical History:   Procedure Laterality Date    BACK SURGERY      CHOLECYSTECTOMY      HYSTERECTOMY      LAPAROTOMY, EXPLORATORY N/A 5/20/2024    Procedure: LAPAROTOMY, EXPLORATORY;  Surgeon: Meño Foster MD;  Location: [facility];  Service: General;  Laterality: N/A;    NECK SURGERY         Review of patient's allergies indicates:   Allergen Reactions    Iodine and iodide containing products        No current facility-administered medications on file prior to encounter.     Current Outpatient Medications on File Prior to Encounter   Medication Sig    atorvastatin (LIPITOR) 20 MG tablet Take 20 mg by mouth once daily.    lisinopriL 10 MG tablet Take 10 mg by mouth once daily.    primidone (MYSOLINE) 50 MG Tab TAKE 2 TABLETS EVERY EVENING    sertraline (ZOLOFT) 25 MG tablet Take 25 mg by mouth once daily.    vitamin D (VITAMIN D3) 1000 units Tab Take 1,000 Units by mouth once daily.    walker Misc 1 each by Misc.(Non-Drug; Combo Route) route as needed.     Family History    None       Tobacco Use    Smoking status: Never    Smokeless tobacco: Not on file   Substance and Sexual Activity    Alcohol use: No    Drug use: No    Sexual activity: Not on file     Review of Systems   Constitutional: Negative for chills, decreased appetite, diaphoresis, fever, malaise/fatigue, weight gain and weight loss.   Cardiovascular:  Positive for leg swelling. Negative for chest pain, claudication, dyspnea on exertion, irregular heartbeat, near-syncope, orthopnea, palpitations and paroxysmal nocturnal dyspnea.   Respiratory:  Negative for cough, shortness of breath, snoring, sputum production and wheezing.    Endocrine: Negative for cold intolerance, heat intolerance, polydipsia, polyphagia and polyuria.   Skin:  Negative for color change, dry skin, itching, nail changes and poor wound healing.   Musculoskeletal:  Negative for back pain, gout,  joint pain and joint swelling.   Gastrointestinal:  Negative for bloating, abdominal pain, constipation, diarrhea, hematemesis, hematochezia, melena, nausea and vomiting.   Genitourinary:  Negative for dysuria, hematuria and nocturia.   Neurological:  Negative for dizziness, headaches, light-headedness, numbness, paresthesias and weakness.   Psychiatric/Behavioral:  Negative for altered mental status, depression and memory loss.      Objective:     Vital Signs (Most Recent):  Temp: 98.2 °F (36.8 °C) (06/04/24 0727)  Pulse: (!) 58 (06/04/24 0727)  Resp: 17 (06/04/24 0727)  BP: (!) 116/56 (06/04/24 0727)  SpO2: 100 % (06/04/24 0727) Vital Signs (24h Range):  Temp:  [97.4 °F (36.3 °C)-98.2 °F (36.8 °C)] 98.2 °F (36.8 °C)  Pulse:  [50-66] 58  Resp:  [17-20] 17  SpO2:  [90 %-100 %] 100 %  BP: (111-147)/(56-77) 116/56     Weight: 55.5 kg (122 lb 5.7 oz)  Body mass index is 25.57 kg/m².    SpO2: 100 %         Intake/Output Summary (Last 24 hours) at 6/4/2024 0952  Last data filed at 6/4/2024 0730  Gross per 24 hour   Intake 305.08 ml   Output --   Net 305.08 ml       Lines/Drains/Airways       Peripheral Intravenous Line  Duration                  Midline Catheter - Single Lumen 06/03/24 2350 Left basilic vein (medial side of arm) other (see comments) <1 day                     Physical Exam  Constitutional:       General: She is not in acute distress.     Appearance: She is well-developed.   Cardiovascular:      Rate and Rhythm: Normal rate and regular rhythm.      Heart sounds: No murmur heard.     No gallop.   Pulmonary:      Effort: Pulmonary effort is normal. No respiratory distress.      Breath sounds: Normal breath sounds. No wheezing.   Abdominal:      General: Bowel sounds are normal. There is no distension.      Palpations: Abdomen is soft.      Tenderness: There is no abdominal tenderness.   Musculoskeletal:      Right lower leg: No edema.      Left lower leg: No edema.   Skin:     General: Skin is warm and dry.    Neurological:      Mental Status: She is alert and oriented to person, place, and time.          Significant Labs: BMP:   Recent Labs   Lab 06/03/24  1408   GLU 83      K 4.0      CO2 21*   BUN 18   CREATININE 0.8   CALCIUM 9.2    and CBC   Recent Labs   Lab 06/03/24  1408 06/04/24  0508   WBC 8.23 6.70   HGB 11.7* 11.5*   HCT 34.6* 34.9*    361       Significant Imaging: Echocardiogram: Transthoracic echo (TTE) complete (Cupid Only):   Results for orders placed or performed during the hospital encounter of 05/17/24   Echo Saline Bubble? No; Ultrasound enhancing contrast? No   Result Value Ref Range    BSA 1.44 m2    LVOT stroke volume 49.00 cm3    LVIDd 2.97 (A) 3.5 - 6.0 cm    LV Systolic Volume 4.29 mL    LV Systolic Volume Index 3.0 mL/m2    LVIDs 1.32 (A) 2.1 - 4.0 cm    LV Diastolic Volume 34.04 mL    LV Diastolic Volume Index 23.80 mL/m2    IVS 0.60 0.6 - 1.1 cm    LVOT diameter 2.04 cm    LVOT area 3.3 cm2    FS 56 (A) 28 - 44 %    Left Ventricle Relative Wall Thickness 0.44 cm    Posterior Wall 0.65 0.6 - 1.1 cm    LV mass 41.33 g    LV Mass Index 29 g/m2    MV Peak E Raghav 0.58 m/s    TDI LATERAL 0.05 m/s    TDI SEPTAL 0.05 m/s    E/E' ratio 11.60 m/s    MV Peak A Raghav 0.83 m/s    TR Max Raghav 1.88 m/s    E/A ratio 0.70     E wave deceleration time 277.84 msec    LV SEPTAL E/E' RATIO 11.60 m/s    LV LATERAL E/E' RATIO 11.60 m/s    PV Peak S Raghav 0.34 m/s    PV Peak D Raghav 0.31 m/s    Pulm vein S/D ratio 1.10     LVOT peak raghav 1.11 m/s    Left Ventricular Outflow Tract Mean Velocity 0.65 cm/s    Left Ventricular Outflow Tract Mean Gradient 1.98 mmHg    RVDD 1.21 cm    RV S' 18.01 cm/s    TAPSE 1.61 cm    RV/LV Ratio 0.41 cm    LA size 2.12 cm    Left Atrium Major Axis 2.10 cm    LA volume (mod) 14.96 cm3    LA Volume Index (Mod) 10.5 mL/m2    RA Major Axis 3.03 cm    AV mean gradient 2 mmHg    AV peak gradient 5 mmHg    Ao peak raghav 1.13 m/s    Ao VTI 14.80 cm    LVOT peak VTI 15.00 cm    AV  valve area 3.31 cm²    AV Velocity Ratio 0.98     AV index (prosthetic) 1.01     SHELBY by Velocity Ratio 3.21 cm²    MV mean gradient 1 mmHg    MV peak gradient 3 mmHg    MV stenosis pressure 1/2 time 71.10 ms    MV valve area p 1/2 method 3.09 cm2    MV valve area by continuity eq 2.40 cm2    MV VTI 20.4 cm    Triscuspid Valve Regurgitation Peak Gradient 14 mmHg    PV PEAK VELOCITY 0.20 m/s    PV peak gradient 0 mmHg    Pulmonary Valve Mean Velocity 0.17 m/s    IVC diameter 1.57 cm    Mean e' 0.05 m/s    ZLVIDS -5.69     ZLVIDD -3.86     Narrative      Left Ventricle: The left ventricle is normal in size. Normal wall   thickness. There is normal systolic function with a visually estimated   ejection fraction of 55 - 60%. Grade I diastolic dysfunction. Normal left   ventricular filling pressure.    Right Ventricle: Right ventricle was not well visualized due to poor   acoustic window. Normal right ventricular cavity size. Systolic function   is borderline low.

## 2024-06-04 NOTE — ASSESSMENT & PLAN NOTE
- SBP 110s-130s  - on Lisinopril as an outpatient and continued while admitted  - goal BP less than 130/80  - BP well controlled; continue ACEI

## 2024-06-19 ENCOUNTER — OFFICE VISIT (OUTPATIENT)
Dept: INTERNAL MEDICINE | Facility: CLINIC | Age: 89
End: 2024-06-19
Payer: MEDICARE

## 2024-06-19 VITALS
BODY MASS INDEX: 22.63 KG/M2 | SYSTOLIC BLOOD PRESSURE: 134 MMHG | OXYGEN SATURATION: 99 % | RESPIRATION RATE: 14 BRPM | DIASTOLIC BLOOD PRESSURE: 74 MMHG | HEART RATE: 73 BPM | HEIGHT: 58 IN | WEIGHT: 107.81 LBS

## 2024-06-19 DIAGNOSIS — K56.609 SBO (SMALL BOWEL OBSTRUCTION): ICD-10-CM

## 2024-06-19 DIAGNOSIS — Z09 HOSPITAL DISCHARGE FOLLOW-UP: Primary | ICD-10-CM

## 2024-06-19 DIAGNOSIS — I82.411 ACUTE DEEP VEIN THROMBOSIS (DVT) OF FEMORAL VEIN OF RIGHT LOWER EXTREMITY: ICD-10-CM

## 2024-06-19 PROBLEM — Z71.89 GOALS OF CARE, COUNSELING/DISCUSSION: Status: RESOLVED | Noted: 2024-06-03 | Resolved: 2024-06-19

## 2024-06-19 PROBLEM — A41.9 SEPSIS: Status: RESOLVED | Noted: 2024-05-20 | Resolved: 2024-06-19

## 2024-06-19 PROBLEM — E87.5 HYPERKALEMIA: Status: RESOLVED | Noted: 2024-05-21 | Resolved: 2024-06-19

## 2024-06-19 PROBLEM — N17.9 AKI (ACUTE KIDNEY INJURY): Status: RESOLVED | Noted: 2024-05-20 | Resolved: 2024-06-19

## 2024-06-19 PROBLEM — R53.1 WEAKNESS: Status: RESOLVED | Noted: 2024-05-24 | Resolved: 2024-06-19

## 2024-06-19 PROCEDURE — 1111F DSCHRG MED/CURRENT MED MERGE: CPT | Mod: HCNC,CPTII,S$GLB, | Performed by: INTERNAL MEDICINE

## 2024-06-19 PROCEDURE — 3288F FALL RISK ASSESSMENT DOCD: CPT | Mod: HCNC,CPTII,S$GLB, | Performed by: INTERNAL MEDICINE

## 2024-06-19 PROCEDURE — 99214 OFFICE O/P EST MOD 30 MIN: CPT | Mod: HCNC,S$GLB,, | Performed by: INTERNAL MEDICINE

## 2024-06-19 PROCEDURE — 1159F MED LIST DOCD IN RCRD: CPT | Mod: HCNC,CPTII,S$GLB, | Performed by: INTERNAL MEDICINE

## 2024-06-19 PROCEDURE — 1160F RVW MEDS BY RX/DR IN RCRD: CPT | Mod: HCNC,CPTII,S$GLB, | Performed by: INTERNAL MEDICINE

## 2024-06-19 PROCEDURE — 99999 PR PBB SHADOW E&M-EST. PATIENT-LVL IV: CPT | Mod: PBBFAC,HCNC,, | Performed by: INTERNAL MEDICINE

## 2024-06-19 PROCEDURE — 1125F AMNT PAIN NOTED PAIN PRSNT: CPT | Mod: HCNC,CPTII,S$GLB, | Performed by: INTERNAL MEDICINE

## 2024-06-19 PROCEDURE — 1101F PT FALLS ASSESS-DOCD LE1/YR: CPT | Mod: HCNC,CPTII,S$GLB, | Performed by: INTERNAL MEDICINE

## 2024-06-19 NOTE — PROGRESS NOTES
"Subjective:       Patient ID: Alexandra Gleason is a 89 y.o. female.    Chief Complaint: Follow-up (HSP f/u ) and Leg Pain (Pt c/o Left leg pain states she believes she pulled a muscle )      HPI:  Patient is known to me and presents for hospital follow up. She has   hypertension, hyperlipidemia, coronary artery disease.     She presented to [facility] ED for abdominal pain, constipation and she was found to have a small-bowel obstruction on CT. Per d/c summary:" The patient failed conservative therapy and she was referred to our hospital for surgical intervention.  On admission general surgery was consulted as well as Cardiology for surgery clearance.  Patient was considered elevated risk secondary to advanced age and pre-existing comorbidities.  NG tube was inserted, IV fluids started in the patient was started on IV antibiotics as well.  She was later taken for surgery and found to have: Small adhesion in LLQ pelvis obstructing ileum, no resection and no perforation.  Patient was tolerated the surgery well and had no complications.  She was given IV fluids for ALEXEY which has resolved.  NG tube was removed and patient started on clear liquid diet.  She was successfully upgraded to regular diet." Discharged home.    She follow up in general surgery clinic and was noted to have right LE pain and swelling. She was sent to ED and US confirmed right LE DVT "Thrombus within the right distal common femoral vein extending throughout the femoral vein and popliteal vein " per US report 6/3/24.   Admitted overnight and discharged on PO Eliquis. Tolerating well. Denies abnormal bleeding.       Today she report itching arms and legs. Since her last discharge. No significant rash but skin has some redness. She is using topical cortisone cream with some relief of sx.       She is eating well. Normal BM. No nausea, vomiting. No pain/swelling in the right LE from DVT. No SOB/CARRERO. She remains very active.     She was seen 5/8/24 for " "her routine visit with me. Typically sees me yearly. Carried over for completeness:  "HTN: on lisinopril. Does not check BP at home. Denies chest pains, SOB, LE edema. A bit normal today.     CAD: follows with Dr. Bhakta. No stents or h/o MI, found on angiogram. Denies angian sx. On statin. Tells me had stress test 12/2019.      HLD: on atorvastatin 20mg. Denies medication side effects. LDl at goal      Osteoporosis: DEXA 5/2019. Off fosamax; on Ca and vit D right, Vit D normalized     Back pain: chronic. CT shows chronic degenerative changes from 5/2019. Sx stable today      Intention tremor:. From prior visit: "She does reports she had had a tremor and it is worsening. She had difficulty drinking from a cup, eating. She had to sip her soup from a cup and can't use a sppon. Handwriting is difficult due to tremor. Reports her mother had a hand tremor as well."  Primidone started and sx are much better. She can write and drink again. No med side effects."    Past Medical History:   Diagnosis Date    High cholesterol     Hypertension        No family history on file.    Social History     Socioeconomic History    Marital status:    Tobacco Use    Smoking status: Never   Substance and Sexual Activity    Alcohol use: No    Drug use: No     Social Determinants of Health     Financial Resource Strain: Low Risk  (5/21/2024)    Overall Financial Resource Strain (CARDIA)     Difficulty of Paying Living Expenses: Not hard at all   Food Insecurity: No Food Insecurity (5/21/2024)    Hunger Vital Sign     Worried About Running Out of Food in the Last Year: Never true     Ran Out of Food in the Last Year: Never true   Transportation Needs: No Transportation Needs (5/21/2024)    TRANSPORTATION NEEDS     Transportation : No   Physical Activity: Sufficiently Active (5/21/2024)    Exercise Vital Sign     Days of Exercise per Week: 7 days     Minutes of Exercise per Session: 60 min   Stress: No Stress Concern Present (5/21/2024) "    [facility] of Occupational Health - Occupational Stress Questionnaire     Feeling of Stress : Not at all   Housing Stability: Low Risk  (5/21/2024)    Housing Stability Vital Sign     Unable to Pay for Housing in the Last Year: No     Homeless in the Last Year: No       Review of Systems   Constitutional:  Negative for activity change, fatigue, fever and unexpected weight change.   HENT:  Negative for congestion, ear pain, hearing loss, rhinorrhea and sore throat.    Eyes:  Negative for redness and visual disturbance.   Respiratory:  Negative for cough, shortness of breath and wheezing.    Cardiovascular:  Negative for chest pain, palpitations and leg swelling.   Gastrointestinal:  Negative for abdominal pain, constipation, diarrhea, nausea and vomiting.   Genitourinary:  Negative for dysuria, frequency and urgency.   Musculoskeletal:  Negative for back pain, joint swelling and neck pain.   Skin:  Negative for color change, rash and wound.        Itching     Neurological:  Negative for dizziness, light-headedness and headaches.         Objective:      Physical Exam  Vitals reviewed.   Constitutional:       General: She is not in acute distress.     Appearance: She is well-developed.   HENT:      Head: Normocephalic and atraumatic.      Right Ear: External ear normal.      Left Ear: External ear normal.      Nose: Nose normal.   Eyes:      General:         Right eye: No discharge.         Left eye: No discharge.      Conjunctiva/sclera: Conjunctivae normal.   Neck:      Thyroid: No thyromegaly.   Cardiovascular:      Rate and Rhythm: Normal rate and regular rhythm.      Heart sounds: No murmur heard.  Pulmonary:      Effort: Pulmonary effort is normal. No respiratory distress.      Breath sounds: Normal breath sounds. No wheezing.   Abdominal:      General: There is no distension.      Palpations: Abdomen is soft.      Tenderness: There is no abdominal tenderness.      Comments: Very well healed midline  abdomen surgical scar   Skin:     General: Skin is warm and dry.      Coloration: Skin is not jaundiced.      Findings: No erythema or rash.   Neurological:      Mental Status: She is alert and oriented to person, place, and time.   Psychiatric:         Behavior: Behavior normal.         Thought Content: Thought content normal.         Assessment:       1. Hospital discharge follow-up    2. SBO (small bowel obstruction)    3. Acute deep vein thrombosis (DVT) of femoral vein of right lower extremity        Plan:       1. Hospital discharge follow-up  Meds reconciled  Problem list reviewed and updated  D/c summary reviewed  Labs and radiology reports reviewed and discussed today  No home health services needed    2. SBO (small bowel obstruction)  Resolved  S/p surgery 6/2024 at [place] for lysis of adhesions  Doing well post post      3. Acute deep vein thrombosis (DVT) of femoral vein of right lower extremity  New problem  Developed post-op. Thus a provoked DVT. I will plan for 3 months of Eliquis (stop 9/2024)  Tolerating well       RTC 6 months in stead of 1 year due to hospitilization to ensure no decompensation in this elderly patient. She is agreeable

## 2024-06-24 ENCOUNTER — TELEPHONE (OUTPATIENT)
Dept: INTERNAL MEDICINE | Facility: CLINIC | Age: 89
End: 2024-06-24
Payer: MEDICARE

## 2024-06-24 RX ORDER — CHOLESTYRAMINE 4 G/4.8G
1 POWDER, FOR SUSPENSION ORAL 2 TIMES DAILY
Qty: 180 PACKET | Refills: 3 | Status: SHIPPED | OUTPATIENT
Start: 2024-06-24 | End: 2025-06-24

## 2024-06-24 NOTE — TELEPHONE ENCOUNTER
Resent just now. Notify patient it was sent again today and to call if [facility] still doesn't receive. Thanks

## 2024-06-24 NOTE — TELEPHONE ENCOUNTER
----- Message from Angeline Alas MA sent at 2024 10:21 AM CDT -----  Contact: pt    ----- Message -----  From: Jannette Andino  Sent: 2024  10:20 AM CDT  To: Carrie DUNCAN Staff    Alexandra Gleason  MRN: [id number]  : 1934  PCP: Carla Butler  Home Phone      [phone]  Work Phone      Not on file.  Mobile          [phone]      MESSAGE: pt states she saw Dr. Butler on May 1st and still hasn't received her prescription of cholestyramine. I did let the pt know she saw Dr. Butler on May 8th but she said she never did blood work on the . She only saw Dr. Butler. I didn't see Cholestyramine in her chart but she spelt the medication for me. Please advise       [facility] - [place], [place] - [address]   [address] [facility] [address]  Phone: [phone] Fax: [phone]  Hours: Not open 24 hours    [phone]

## 2024-06-24 NOTE — TELEPHONE ENCOUNTER
Patient state's never received her medication Cholestyramine to [facility] pharmacy, she state's had a visit with Dr. Butler on 05/09/2024. Please advise.

## 2024-07-23 NOTE — TELEPHONE ENCOUNTER
----- Message from Liza Gan sent at 2024 11:52 AM CDT -----  Contact: Shannon/José Morris Kat Gleason  MRN: [id number]  : 1934  PCP: Carla Butler  Home Phone      [phone]  Work Phone      Not on file.  Mobile          [phone]      MESSAGE:      Pt granddaughter Ana states pt needs a 90 day refill of apixaban (ELIQUIS) 2.5 mg Tab sent to [facility].       [facility]   [phone]

## 2024-07-30 DIAGNOSIS — F41.9 ANXIETY: Primary | ICD-10-CM

## 2024-07-30 DIAGNOSIS — G25.2 INTENTION TREMOR: ICD-10-CM

## 2024-07-30 RX ORDER — SERTRALINE HYDROCHLORIDE 25 MG/1
25 TABLET, FILM COATED ORAL DAILY
Qty: 90 TABLET | Refills: 3 | Status: SHIPPED | OUTPATIENT
Start: 2024-07-30

## 2024-07-30 NOTE — TELEPHONE ENCOUNTER
----- Message from Marimar Jiang sent at 2024  1:58 PM CDT -----  Contact: pt  Alexandra Gleason  MRN: [id number]  : 1934  PCP: Carla Butler  Home Phone      [phone]  Work Phone      Not on file.  Mobile          [phone]      MESSAGE:     Pt need refill on sertraline (ZOLOFT) 25 MG tablet                Preferred Pharmacy    [facility] - [place], [place] - [address]   [address] [facility] [address]  Phone: [phone] Fax: [phone]  Hours: Not open 24 hours        [phone]

## 2024-07-30 NOTE — TELEPHONE ENCOUNTER
No care due was identified.  [facility] Embedded Care Due Messages. Reference number: [id number].   7/30/2024 2:27:54 PM CDT

## 2024-09-25 DIAGNOSIS — Z00.00 ENCOUNTER FOR MEDICARE ANNUAL WELLNESS EXAM: ICD-10-CM

## 2024-11-25 ENCOUNTER — TELEPHONE (OUTPATIENT)
Dept: INTERNAL MEDICINE | Facility: CLINIC | Age: 89
End: 2024-11-25
Payer: MEDICARE

## 2024-11-25 NOTE — TELEPHONE ENCOUNTER
Called pt grand daughter back.  I let her know that the pt needed to stay hydrated and to take Imodium.  She confirmed that the pt had been eating and staying hydrated.

## 2024-11-25 NOTE — TELEPHONE ENCOUNTER
----- Message from Liza sent at 2024 12:57 PM CST -----  Contact: angely/granddaughter  Alexandra Gleason  MRN: [id number]  : 1934  PCP: Carla Butler  Home Phone      [phone]  Work Phone      Not on file.  Mobile          [phone]      MESSAGE:     Pt granddaughter left a vm asking for something to be called in for diarrhea. She states she has had diarrhea all weekend.       Angely   [phone]

## 2024-12-02 DIAGNOSIS — G25.2 INTENTION TREMOR: ICD-10-CM

## 2024-12-02 RX ORDER — PRIMIDONE 50 MG/1
100 TABLET ORAL NIGHTLY
Qty: 180 TABLET | Refills: 0 | Status: SHIPPED | OUTPATIENT
Start: 2024-12-02

## 2024-12-02 NOTE — TELEPHONE ENCOUNTER
----- Message from Marimar sent at 2024 11:20 AM CST -----  Contact: pt  Alexandra Gleason  MRN: [id number]  : 1934  PCP: Carla Butler  Home Phone      [phone]  Work Phone      Not on file.  Mobile          [phone]      MESSAGE:     Pt need refill on primidone (MYSOLINE) 50 MG Tab and apixaban (ELIQUIS) 2.5 mg Tab                 Preferred Pharmacy    [facility] - [place], [place] - [address]  [address] [facility] [address]  Phone: [phone] Fax: [phone]  Hours: Not open 24 hours          [phone]

## 2024-12-16 ENCOUNTER — TELEPHONE (OUTPATIENT)
Dept: INTERNAL MEDICINE | Facility: CLINIC | Age: 89
End: 2024-12-16
Payer: MEDICARE

## 2024-12-16 NOTE — TELEPHONE ENCOUNTER
She is correct. A refill request was sent in while I was out and med was refilled. But on further chart review she is 6 months from DVT. She can stop eliquis.

## 2024-12-16 NOTE — TELEPHONE ENCOUNTER
Notified Ana. Also notified her that if patient develops any symptoms of a blood clot to call us or go to ER

## 2024-12-16 NOTE — TELEPHONE ENCOUNTER
----- Message from Jannette sent at 2024  3:00 PM CST -----  Contact: Ana Gleason  MRN: [id number]  : 1934  PCP: Carla Butler  Home Phone      [phone]  Work Phone      Not on file.  Mobile          [phone]      MESSAGE: Ana Callejas states the pt was prescribed apixaban (ELIQUIS) 2.5 mg Tab in may due to blood clots after surgery. Ana states the pt was to be on them for 6 months. The 6 months would have been November. Ana states the pt called [facility] pharmacy and the cost is almost $400 so the pt doesn't want to continue taking it. Ana is asking if the pt needs to come in to make sure it is ok to stop taking the medication or if it is ok to stop taking it. She also wants to know if the pt needs to continue taking the medication is there something cheaper for her to take? Please advise       Ana [phone] (Granddaughter)

## 2024-12-30 ENCOUNTER — OFFICE VISIT (OUTPATIENT)
Dept: INTERNAL MEDICINE | Facility: CLINIC | Age: 89
End: 2024-12-30
Payer: MEDICARE

## 2024-12-30 VITALS
BODY MASS INDEX: 23.69 KG/M2 | WEIGHT: 112.88 LBS | RESPIRATION RATE: 16 BRPM | OXYGEN SATURATION: 99 % | SYSTOLIC BLOOD PRESSURE: 124 MMHG | HEART RATE: 66 BPM | DIASTOLIC BLOOD PRESSURE: 70 MMHG | HEIGHT: 58 IN

## 2024-12-30 DIAGNOSIS — M81.0 AGE-RELATED OSTEOPOROSIS WITHOUT CURRENT PATHOLOGICAL FRACTURE: ICD-10-CM

## 2024-12-30 DIAGNOSIS — E78.2 MIXED HYPERLIPIDEMIA: ICD-10-CM

## 2024-12-30 DIAGNOSIS — Z23 NEED FOR VACCINATION: ICD-10-CM

## 2024-12-30 DIAGNOSIS — F41.9 ANXIETY: ICD-10-CM

## 2024-12-30 DIAGNOSIS — M10.072 ACUTE IDIOPATHIC GOUT INVOLVING TOE OF LEFT FOOT: Primary | ICD-10-CM

## 2024-12-30 DIAGNOSIS — I10 PRIMARY HYPERTENSION: ICD-10-CM

## 2024-12-30 DIAGNOSIS — G25.2 INTENTION TREMOR: ICD-10-CM

## 2024-12-30 DIAGNOSIS — K58.0 IRRITABLE BOWEL SYNDROME WITH DIARRHEA: ICD-10-CM

## 2024-12-30 PROBLEM — I82.411 ACUTE DEEP VEIN THROMBOSIS (DVT) OF FEMORAL VEIN OF RIGHT LOWER EXTREMITY: Status: RESOLVED | Noted: 2024-06-03 | Resolved: 2024-12-30

## 2024-12-30 PROBLEM — N18.31 STAGE 3A CHRONIC KIDNEY DISEASE: Status: RESOLVED | Noted: 2021-05-18 | Resolved: 2024-12-30

## 2024-12-30 PROCEDURE — 99999 PR PBB SHADOW E&M-EST. PATIENT-LVL III: CPT | Mod: PBBFAC,HCNC,, | Performed by: INTERNAL MEDICINE

## 2024-12-30 PROCEDURE — 1159F MED LIST DOCD IN RCRD: CPT | Mod: HCNC,CPTII,S$GLB, | Performed by: INTERNAL MEDICINE

## 2024-12-30 PROCEDURE — 99215 OFFICE O/P EST HI 40 MIN: CPT | Mod: HCNC,S$GLB,, | Performed by: INTERNAL MEDICINE

## 2024-12-30 PROCEDURE — 3288F FALL RISK ASSESSMENT DOCD: CPT | Mod: HCNC,CPTII,S$GLB, | Performed by: INTERNAL MEDICINE

## 2024-12-30 PROCEDURE — G2211 COMPLEX E/M VISIT ADD ON: HCPCS | Mod: HCNC,S$GLB,, | Performed by: INTERNAL MEDICINE

## 2024-12-30 PROCEDURE — 1101F PT FALLS ASSESS-DOCD LE1/YR: CPT | Mod: HCNC,CPTII,S$GLB, | Performed by: INTERNAL MEDICINE

## 2024-12-30 PROCEDURE — G0008 ADMIN INFLUENZA VIRUS VAC: HCPCS | Mod: HCNC,S$GLB,, | Performed by: INTERNAL MEDICINE

## 2024-12-30 PROCEDURE — 1125F AMNT PAIN NOTED PAIN PRSNT: CPT | Mod: HCNC,CPTII,S$GLB, | Performed by: INTERNAL MEDICINE

## 2024-12-30 PROCEDURE — 1160F RVW MEDS BY RX/DR IN RCRD: CPT | Mod: HCNC,CPTII,S$GLB, | Performed by: INTERNAL MEDICINE

## 2024-12-30 PROCEDURE — 90653 IIV ADJUVANT VACCINE IM: CPT | Mod: HCNC,S$GLB,, | Performed by: INTERNAL MEDICINE

## 2024-12-30 RX ORDER — INDOMETHACIN 75 MG/1
75 CAPSULE, EXTENDED RELEASE ORAL 2 TIMES DAILY WITH MEALS
Qty: 30 CAPSULE | Refills: 0 | Status: SHIPPED | OUTPATIENT
Start: 2024-12-30

## 2024-12-30 NOTE — PROGRESS NOTES
"Subjective:       Patient ID: Alexandra Gleason is a 89 y.o. female.    Chief Complaint: Follow-up (Pt here for 6 mth f/u. )      HPI:  Patient is known to me and presents for follow up chronic medical conditions. No new labs prior to today's visit.    Today she had acute pain and swelling of left foot. Sx started 3 days ago. Swelling of great toe. Pain in MTP joint. Swelling associated with this joint as well. Denies injury to foot/joint. No h/o gout.     HTN: on lisinopril. Does not check BP at home. Denies chest pains, SOB, LE edema. A bit normal today.     CAD: follows with Dr. Bhakta. No stents or h/o MI, found on angiogram. Denies angian sx. On statin. Tells me had stress test 12/2019.      HLD: on atorvastatin 20mg. Denies medication side effects. LDl at goal      Osteoporosis: DEXA 5/2019. Off fosamax; on Ca and vit D right, Vit D normalized     Back pain: chronic. CT shows chronic degenerative changes from 5/2019. Sx stable today     Anxiety: on zoloft 25mg daily. Working well denies med side effects.    Irritable bowel with diarrhea. : continues with intermittent sx tried imodium with some relief. Gets a spell of diarrhea once a week on average (sometimes less) and takes imodium. We tried cholecystyramine when it was more frequent but couldn't swallow pill or powder so stopped taking. Discussed GI/C-scope but given her age not interested at this time.      Intention tremor:. From prior visit: "She does reports she had had a tremor and it is worsening. She had difficulty drinking from a cup, eating. She had to sip her soup from a cup and can't use a sppon. Handwriting is difficult due to tremor. Reports her mother had a hand tremor as well."  Primidone started and sx are much better. She can write and drink again. No med side effects.    She has h/o DVT. Completed treatment with Eliquis and no longer taking.     Past Medical History:   Diagnosis Date    High cholesterol     Hypertension        No family " history on file.    Social History     Socioeconomic History    Marital status:    Tobacco Use    Smoking status: Never   Substance and Sexual Activity    Alcohol use: No    Drug use: No     Social Drivers of Health     Financial Resource Strain: Low Risk  (5/21/2024)    Overall Financial Resource Strain (CARDIA)     Difficulty of Paying Living Expenses: Not hard at all   Food Insecurity: No Food Insecurity (5/21/2024)    Hunger Vital Sign     Worried About Running Out of Food in the Last Year: Never true     Ran Out of Food in the Last Year: Never true   Transportation Needs: No Transportation Needs (5/21/2024)    TRANSPORTATION NEEDS     Transportation : No   Physical Activity: Sufficiently Active (5/21/2024)    Exercise Vital Sign     Days of Exercise per Week: 7 days     Minutes of Exercise per Session: 60 min   Stress: No Stress Concern Present (5/21/2024)    [facility] of [employer]ational [employer] - Occupational Stress Questionnaire     Feeling of Stress : Not at all   Housing Stability: Low Risk  (5/21/2024)    Housing Stability Vital Sign     Unable to Pay for Housing in the Last Year: No     Homeless in the Last Year: No       Review of Systems   Constitutional:  Negative for activity change, fatigue, fever and unexpected weight change.   HENT:  Negative for congestion, ear pain, hearing loss, rhinorrhea and sore throat.    Eyes:  Negative for redness and visual disturbance.   Respiratory:  Negative for cough, shortness of breath and wheezing.    Cardiovascular:  Negative for chest pain, palpitations and leg swelling.   Gastrointestinal:  Positive for diarrhea. Negative for abdominal pain, constipation, nausea and vomiting.   Genitourinary:  Negative for dysuria, frequency, pelvic pain and urgency.   Musculoskeletal:  Positive for arthralgias and joint swelling. Negative for back pain and neck pain.   Skin:  Negative for color change, rash and wound.   Neurological:  Negative for dizziness,  light-headedness and headaches.         Objective:      Physical Exam  Vitals reviewed.   Constitutional:       General: She is not in acute distress.     Appearance: She is well-developed.   HENT:      Head: Normocephalic and atraumatic.      Right Ear: External ear normal.      Left Ear: External ear normal.      Nose: Nose normal.   Eyes:      General:         Right eye: No discharge.         Left eye: No discharge.      Conjunctiva/sclera: Conjunctivae normal.   Neck:      Thyroid: No thyromegaly.   Cardiovascular:      Rate and Rhythm: Normal rate and regular rhythm.      Heart sounds: No murmur heard.  Pulmonary:      Effort: Pulmonary effort is normal. No respiratory distress.      Breath sounds: Normal breath sounds. No wheezing.   Abdominal:      General: There is no distension.      Palpations: Abdomen is soft.      Tenderness: There is no abdominal tenderness.   Musculoskeletal:         General: Swelling (left MTP) and tenderness present.   Skin:     General: Skin is warm and dry.   Neurological:      Mental Status: She is alert and oriented to person, place, and time.   Psychiatric:         Behavior: Behavior normal.         Thought Content: Thought content normal.         Assessment:       1. Acute idiopathic gout involving toe of left foot    2. Irritable bowel syndrome with diarrhea    3. Primary hypertension    4. Mixed hyperlipidemia    5. Age-related osteoporosis without current pathological fracture    6. Need for vaccination    7. Intention tremor    8. Anxiety        Plan:       1. Acute idiopathic gout involving toe of left foot  New problem  Exam consistent with acute gout of left MTP  Last several GFR > 60  Start indomethacin  No colchicine due to chronic diarrhea sx  Check labs with uric acid; pending may start allopurinol  -     indomethacin (INDOCIN SR) 75 mg CpSR CR capsule; Take 1 capsule (75 mg total) by mouth 2 (two) times daily with meals.  Dispense: 30 capsule; Refill: 0  -     CBC  Auto Differential; Future; Expected date: 12/30/2024  -     Comprehensive Metabolic Panel; Future; Expected date: 12/30/2024  -     TSH; Future; Expected date: 12/30/2024  -     Lipid Panel; Future; Expected date: 12/30/2024  -     T4, Free; Future; Expected date: 12/30/2024  -     URIC ACID; Future; Expected date: 12/30/2024    2. Irritable bowel syndrome with diarrhea  Chronic stable  Can't toelrate cholestyramine  Ok to use PRN imodium--using once a week on average, sometimes less  Overview:  Hold cholestyramine for now.      Orders:  -     CBC Auto Differential; Future; Expected date: 12/30/2024  -     Comprehensive Metabolic Panel; Future; Expected date: 12/30/2024    3. Primary hypertension  Chronic controlled  Continue medications at same dose  Low Na diet  Exercise, weight loss  Check BP and keep log for next visit        Orders:  -     CBC Auto Differential; Future; Expected date: 12/30/2024  -     Comprehensive Metabolic Panel; Future; Expected date: 12/30/2024  -     TSH; Future; Expected date: 12/30/2024  -     Lipid Panel; Future; Expected date: 12/30/2024  -     T4, Free; Future; Expected date: 12/30/2024    4. Mixed hyperlipidemia  Chronic controlled  Cont statin        Orders:  -     CBC Auto Differential; Future; Expected date: 12/30/2024  -     Comprehensive Metabolic Panel; Future; Expected date: 12/30/2024  -     TSH; Future; Expected date: 12/30/2024  -     Lipid Panel; Future; Expected date: 12/30/2024  -     T4, Free; Future; Expected date: 12/30/2024    5. Age-related osteoporosis without current pathological fracture  Chornic stable  Cont Ca and Vit D  -     CBC Auto Differential; Future; Expected date: 12/30/2024  -     Comprehensive Metabolic Panel; Future; Expected date: 12/30/2024    6. Need for vaccination  Done today  -     Influenza - Trivalent (Adjuvanted)    7. Intention tremor  Chronic stable  Cont primidone nightly  -     CBC Auto Differential; Future; Expected date: 12/30/2024  -      Comprehensive Metabolic Panel; Future; Expected date: 12/30/2024  -     TSH; Future; Expected date: 12/30/2024  -     T4, Free; Future; Expected date: 12/30/2024    8. Anxiety  Chronic stable  Cont zoloft same dose  Overview:  She takes Zoloft 25 mg daily.      Orders:  -     CBC Auto Differential; Future; Expected date: 12/30/2024  -     Comprehensive Metabolic Panel; Future; Expected date: 12/30/2024  -     TSH; Future; Expected date: 12/30/2024  -     T4, Free; Future; Expected date: 12/30/2024       RTC as scheduled and PRN pending labs next week

## 2025-01-06 ENCOUNTER — LAB VISIT (OUTPATIENT)
Dept: LAB | Facility: HOSPITAL | Age: OVER 89
End: 2025-01-06
Attending: INTERNAL MEDICINE
Payer: MEDICARE

## 2025-01-06 DIAGNOSIS — I10 PRIMARY HYPERTENSION: ICD-10-CM

## 2025-01-06 DIAGNOSIS — E78.2 MIXED HYPERLIPIDEMIA: ICD-10-CM

## 2025-01-06 DIAGNOSIS — G25.2 INTENTION TREMOR: ICD-10-CM

## 2025-01-06 DIAGNOSIS — K58.0 IRRITABLE BOWEL SYNDROME WITH DIARRHEA: ICD-10-CM

## 2025-01-06 DIAGNOSIS — M81.0 AGE-RELATED OSTEOPOROSIS WITHOUT CURRENT PATHOLOGICAL FRACTURE: ICD-10-CM

## 2025-01-06 DIAGNOSIS — F41.9 ANXIETY: ICD-10-CM

## 2025-01-06 DIAGNOSIS — M10.072 ACUTE IDIOPATHIC GOUT INVOLVING TOE OF LEFT FOOT: ICD-10-CM

## 2025-01-06 LAB
ALBUMIN SERPL BCP-MCNC: 4 G/DL (ref 3.5–5.2)
ALP SERPL-CCNC: 67 U/L (ref 40–150)
ALT SERPL W/O P-5'-P-CCNC: 16 U/L (ref 10–44)
ANION GAP SERPL CALC-SCNC: 11 MMOL/L (ref 8–16)
AST SERPL-CCNC: 20 U/L (ref 10–40)
BASOPHILS # BLD AUTO: 0.1 K/UL (ref 0–0.2)
BASOPHILS NFR BLD: 1.4 % (ref 0–1.9)
BILIRUB SERPL-MCNC: 0.4 MG/DL (ref 0.1–1)
BUN SERPL-MCNC: 21 MG/DL (ref 8–23)
CALCIUM SERPL-MCNC: 9.8 MG/DL (ref 8.7–10.5)
CHLORIDE SERPL-SCNC: 107 MMOL/L (ref 95–110)
CHOLEST SERPL-MCNC: 155 MG/DL (ref 120–199)
CHOLEST/HDLC SERPL: 2.6 {RATIO} (ref 2–5)
CO2 SERPL-SCNC: 22 MMOL/L (ref 23–29)
CREAT SERPL-MCNC: 0.8 MG/DL (ref 0.5–1.4)
DIFFERENTIAL METHOD BLD: NORMAL
EOSINOPHIL # BLD AUTO: 0.1 K/UL (ref 0–0.5)
EOSINOPHIL NFR BLD: 1.9 % (ref 0–8)
ERYTHROCYTE [DISTWIDTH] IN BLOOD BY AUTOMATED COUNT: 13.2 % (ref 11.5–14.5)
EST. GFR  (NO RACE VARIABLE): >60 ML/MIN/1.73 M^2
GLUCOSE SERPL-MCNC: 82 MG/DL (ref 70–110)
HCT VFR BLD AUTO: 40.6 % (ref 37–48.5)
HDLC SERPL-MCNC: 60 MG/DL (ref 40–75)
HDLC SERPL: 38.7 % (ref 20–50)
HGB BLD-MCNC: 13.3 G/DL (ref 12–16)
IMM GRANULOCYTES # BLD AUTO: 0.02 K/UL (ref 0–0.04)
IMM GRANULOCYTES NFR BLD AUTO: 0.3 % (ref 0–0.5)
LDLC SERPL CALC-MCNC: 82.8 MG/DL (ref 63–159)
LYMPHOCYTES # BLD AUTO: 2.1 K/UL (ref 1–4.8)
LYMPHOCYTES NFR BLD: 29.5 % (ref 18–48)
MCH RBC QN AUTO: 28.7 PG (ref 27–31)
MCHC RBC AUTO-ENTMCNC: 32.8 G/DL (ref 32–36)
MCV RBC AUTO: 88 FL (ref 82–98)
MONOCYTES # BLD AUTO: 0.6 K/UL (ref 0.3–1)
MONOCYTES NFR BLD: 8.7 % (ref 4–15)
NEUTROPHILS # BLD AUTO: 4.1 K/UL (ref 1.8–7.7)
NEUTROPHILS NFR BLD: 58.2 % (ref 38–73)
NONHDLC SERPL-MCNC: 95 MG/DL
NRBC BLD-RTO: 0 /100 WBC
PLATELET # BLD AUTO: 267 K/UL (ref 150–450)
PMV BLD AUTO: 10.7 FL (ref 9.2–12.9)
POTASSIUM SERPL-SCNC: 4.7 MMOL/L (ref 3.5–5.1)
PROT SERPL-MCNC: 7.3 G/DL (ref 6–8.4)
RBC # BLD AUTO: 4.63 M/UL (ref 4–5.4)
SODIUM SERPL-SCNC: 140 MMOL/L (ref 136–145)
T4 FREE SERPL-MCNC: 0.86 NG/DL (ref 0.71–1.51)
TRIGL SERPL-MCNC: 61 MG/DL (ref 30–150)
TSH SERPL DL<=0.005 MIU/L-ACNC: 2.67 UIU/ML (ref 0.4–4)
URATE SERPL-MCNC: 6 MG/DL (ref 2.4–5.7)
WBC # BLD AUTO: 7.01 K/UL (ref 3.9–12.7)

## 2025-01-06 PROCEDURE — 85025 COMPLETE CBC W/AUTO DIFF WBC: CPT | Mod: HCNC | Performed by: INTERNAL MEDICINE

## 2025-01-06 PROCEDURE — 84443 ASSAY THYROID STIM HORMONE: CPT | Mod: HCNC | Performed by: INTERNAL MEDICINE

## 2025-01-06 PROCEDURE — 84550 ASSAY OF BLOOD/URIC ACID: CPT | Mod: HCNC | Performed by: INTERNAL MEDICINE

## 2025-01-06 PROCEDURE — 80061 LIPID PANEL: CPT | Mod: HCNC | Performed by: INTERNAL MEDICINE

## 2025-01-06 PROCEDURE — 36415 COLL VENOUS BLD VENIPUNCTURE: CPT | Mod: HCNC | Performed by: INTERNAL MEDICINE

## 2025-01-06 PROCEDURE — 80053 COMPREHEN METABOLIC PANEL: CPT | Mod: HCNC | Performed by: INTERNAL MEDICINE

## 2025-01-06 PROCEDURE — 84439 ASSAY OF FREE THYROXINE: CPT | Mod: HCNC | Performed by: INTERNAL MEDICINE

## 2025-01-13 ENCOUNTER — TELEPHONE (OUTPATIENT)
Dept: INTERNAL MEDICINE | Facility: CLINIC | Age: OVER 89
End: 2025-01-13
Payer: MEDICARE

## 2025-01-13 DIAGNOSIS — K52.9 CHRONIC DIARRHEA: Primary | ICD-10-CM

## 2025-01-13 NOTE — TELEPHONE ENCOUNTER
----- Message from Jannette sent at 2025  3:01 PM CST -----  Contact: pt  Alexandra Gleason  MRN: [id number]  : 1934  PCP: Carla Butler  Home Phone      [phone]  Work Phone      Not on file.  Mobile          [phone]      MESSAGE: pt states she has diarrhea almost daily and can't go any where. Pt states Dr. Butler said to call her when this happens so they can figure something out. Please advise     Pt states if something is called in please send to [facility]      [phone]

## 2025-01-13 NOTE — TELEPHONE ENCOUNTER
I told her if it got worse we would send her to GI to discuss C-scope. She did not want that hat her visit 2 weeks ago but if she ready now we can do that. Placed referral. Please schedule.   She can also start an OTC probiotic which is sometimes helpful.    One other treatment we can consider is rifaximin which is taken twice a day for 14 days. However, it can be very very expensive. I sent a prescription but if too expensive then just do probiotic, imodium as needed and follow up with GI

## 2025-01-14 NOTE — TELEPHONE ENCOUNTER
No care due was identified.  [facility] Embedded Care Due Messages. Reference number: [id number].   1/14/2025 1:21:56 PM CST

## 2025-01-14 NOTE — TELEPHONE ENCOUNTER
----- Message from Liza sent at 2025 12:17 PM CST -----  Contact: Alexandra Morris Kat Gleason  MRN: [id number]  : 1934  PCP: Carla Butler  Home Phone      [phone]  Work Phone      Not on file.  Mobile          [phone]      MESSAGE:     Pt states rifAXIMin (XIFAXAN) 550 mg Tab was supposed to be sent to [facility] pharmacy.       Alexandra  [phone]

## 2025-02-22 DIAGNOSIS — Z00.00 ENCOUNTER FOR MEDICARE ANNUAL WELLNESS EXAM: ICD-10-CM

## 2025-02-27 DIAGNOSIS — G25.2 INTENTION TREMOR: ICD-10-CM

## 2025-03-05 RX ORDER — PRIMIDONE 50 MG/1
100 TABLET ORAL NIGHTLY
Qty: 180 TABLET | Refills: 3 | Status: SHIPPED | OUTPATIENT
Start: 2025-03-05

## 2025-03-27 ENCOUNTER — TELEPHONE (OUTPATIENT)
Dept: INTERNAL MEDICINE | Facility: CLINIC | Age: OVER 89
End: 2025-03-27
Payer: MEDICARE

## 2025-03-27 NOTE — TELEPHONE ENCOUNTER
----- Message from Jannette sent at 3/27/2025  9:16 AM CDT -----  Contact: pt  Alexandra Stephens ClaudioorlandoMRN: [id number]: 4/26/1934PCP: Lyn Butler Phone      [phone] Phone      Not on file.Mobile          [phone]: pt has two appt in June. She is confused about them. She states the insurance says she needs to see Dr. Butler in May, not sure if the annual should be with jolly or not. Please advise [phone]

## 2025-05-17 RX ORDER — SERTRALINE HYDROCHLORIDE 25 MG/1
25 TABLET, FILM COATED ORAL
Qty: 90 TABLET | Refills: 2 | Status: SHIPPED | OUTPATIENT
Start: 2025-05-17

## 2025-05-17 NOTE — TELEPHONE ENCOUNTER
Refill Decision Note   Alexandra Gleason  is requesting a refill authorization.  Brief Assessment and Rationale for Refill:  Approve     Medication Therapy Plan:         Comments:     Note composed:1:19 PM 05/17/2025

## 2025-05-17 NOTE — TELEPHONE ENCOUNTER
No care due was identified.  [facility] Embedded Care Due Messages. Reference number: [id number].   5/17/2025 2:57:38 AM CDT

## 2025-06-23 ENCOUNTER — OFFICE VISIT (OUTPATIENT)
Dept: INTERNAL MEDICINE | Facility: CLINIC | Age: OVER 89
End: 2025-06-23
Payer: MEDICARE

## 2025-06-23 VITALS
HEART RATE: 68 BPM | WEIGHT: 119.06 LBS | BODY MASS INDEX: 24.99 KG/M2 | SYSTOLIC BLOOD PRESSURE: 108 MMHG | DIASTOLIC BLOOD PRESSURE: 68 MMHG | OXYGEN SATURATION: 99 % | HEIGHT: 58 IN | RESPIRATION RATE: 16 BRPM

## 2025-06-23 DIAGNOSIS — I10 PRIMARY HYPERTENSION: Primary | ICD-10-CM

## 2025-06-23 DIAGNOSIS — I25.10 CORONARY ARTERY DISEASE INVOLVING NATIVE CORONARY ARTERY OF NATIVE HEART WITHOUT ANGINA PECTORIS: ICD-10-CM

## 2025-06-23 DIAGNOSIS — E78.2 MIXED HYPERLIPIDEMIA: ICD-10-CM

## 2025-06-23 DIAGNOSIS — E79.0 ELEVATED URIC ACID IN BLOOD: ICD-10-CM

## 2025-06-23 DIAGNOSIS — F41.1 GAD (GENERALIZED ANXIETY DISORDER): ICD-10-CM

## 2025-06-23 DIAGNOSIS — M81.0 AGE-RELATED OSTEOPOROSIS WITHOUT CURRENT PATHOLOGICAL FRACTURE: ICD-10-CM

## 2025-06-23 PROCEDURE — 1160F RVW MEDS BY RX/DR IN RCRD: CPT | Mod: CPTII,HCNC,S$GLB, | Performed by: INTERNAL MEDICINE

## 2025-06-23 PROCEDURE — 99999 PR PBB SHADOW E&M-EST. PATIENT-LVL III: CPT | Mod: PBBFAC,HCNC,, | Performed by: INTERNAL MEDICINE

## 2025-06-23 PROCEDURE — 1159F MED LIST DOCD IN RCRD: CPT | Mod: CPTII,HCNC,S$GLB, | Performed by: INTERNAL MEDICINE

## 2025-06-23 PROCEDURE — 3288F FALL RISK ASSESSMENT DOCD: CPT | Mod: CPTII,HCNC,S$GLB, | Performed by: INTERNAL MEDICINE

## 2025-06-23 PROCEDURE — 1126F AMNT PAIN NOTED NONE PRSNT: CPT | Mod: CPTII,HCNC,S$GLB, | Performed by: INTERNAL MEDICINE

## 2025-06-23 PROCEDURE — 1101F PT FALLS ASSESS-DOCD LE1/YR: CPT | Mod: CPTII,HCNC,S$GLB, | Performed by: INTERNAL MEDICINE

## 2025-06-23 PROCEDURE — 99214 OFFICE O/P EST MOD 30 MIN: CPT | Mod: HCNC,S$GLB,, | Performed by: INTERNAL MEDICINE

## 2025-06-23 PROCEDURE — G2211 COMPLEX E/M VISIT ADD ON: HCPCS | Mod: HCNC,S$GLB,, | Performed by: INTERNAL MEDICINE

## 2025-06-23 NOTE — PROGRESS NOTES
"Subjective:       Patient ID: Alexandra Gleason is a 89 y.o. female.    Chief Complaint: Follow-up (6 month check up )      HPI:  Patient is known to me and presents for follow up chronic medical conditions. No new labs prior to today's visit.  She has upcoming visit with [facility] to do labs.      At last visit she had acute pain and swelling of left foot. + swelling of great toe. Pain in MTP joint. Swelling associated with this joint as well. Denies injury to foot/joint. No h/o gout.  Uric acid was 6.  Discussed with the patient and was thought ready to add daily allopurinol.  She reports she has had 2 less severe issues with the pain since I saw her last.  Took 1 dose of indomethacin and resolved.  She would like to continue with his current treatment plan.       HTN: on lisinopril. Does not check BP at home. Denies chest pains, SOB, LE edema. A bit normal today.     CAD: follows with Dr. Bhakta. No stents or h/o MI, found on angiogram. Denies angian sx. On statin. Tells me had stress test 12/2019.      HLD: on atorvastatin 20mg. Denies medication side effects. LDl at goal      Osteoporosis: DEXA 5/2019. Off fosamax; on Ca and vit D right, Vit D normalized     Back pain: chronic. CT shows chronic degenerative changes from 5/2019. Sx stable today      Anxiety: on zoloft 25mg daily. Working well denies med side effects.     Irritable bowel with diarrhea. : continues with intermittent sx tried imodium with some relief. Gets a spell of diarrhea once a week on average (sometimes less) and takes imodium. We tried cholecystyramine when it was more frequent but couldn't swallow pill or powder so stopped taking.  Tried sending rifaximin but was too expensive.  Discussed GI/C-scope but given her age not interested at this time.      Intention tremor:. From prior visit: "She does reports she had had a tremor and it is worsening. She had difficulty drinking from a cup, eating. She had to sip her soup from a cup and can't use a " "sppon. Handwriting is difficult due to tremor. Reports her mother had a hand tremor as well."  Primidone started and sx are much better. She can write and drink again. No med side effects.     She has h/o DVT. Completed treatment with Eliquis and no longer taking.     Past Medical History:   Diagnosis Date    High cholesterol     Hypertension        No family history on file.    Social History[1]    Review of Systems   Constitutional:  Negative for activity change, fatigue, fever and unexpected weight change.   HENT:  Negative for congestion, ear pain, hearing loss, rhinorrhea and sore throat.    Eyes:  Negative for redness and visual disturbance.   Respiratory:  Negative for cough, shortness of breath and wheezing.    Cardiovascular:  Negative for chest pain, palpitations and leg swelling.   Gastrointestinal:  Positive for diarrhea (Chronic). Negative for abdominal pain, constipation, nausea and vomiting.   Musculoskeletal:  Negative for back pain, joint swelling and neck pain.   Skin:  Negative for color change, rash and wound.   Neurological:  Negative for dizziness, light-headedness and headaches.         Objective:      Physical Exam  Vitals reviewed.   Constitutional:       General: She is not in acute distress.     Appearance: She is well-developed.   HENT:      Head: Normocephalic and atraumatic.      Right Ear: External ear normal.      Left Ear: External ear normal.      Nose: Nose normal.   Eyes:      General:         Right eye: No discharge.         Left eye: No discharge.      Conjunctiva/sclera: Conjunctivae normal.   Neck:      Thyroid: No thyromegaly.   Cardiovascular:      Rate and Rhythm: Normal rate and regular rhythm.      Heart sounds: No murmur heard.  Pulmonary:      Effort: Pulmonary effort is normal. No respiratory distress.      Breath sounds: Normal breath sounds.   Abdominal:      General: There is no distension.      Palpations: Abdomen is soft.      Tenderness: There is no abdominal " tenderness.   Skin:     General: Skin is warm and dry.   Neurological:      General: No focal deficit present.      Mental Status: She is alert and oriented to person, place, and time.   Psychiatric:         Behavior: Behavior normal.         Thought Content: Thought content normal.         Assessment:       1. Primary hypertension    2. Mixed hyperlipidemia    3. Coronary artery disease involving native coronary artery of native heart without angina pectoris    4. Age-related osteoporosis without current pathological fracture    5. ANA PAULA (generalized anxiety disorder)    6. Elevated uric acid in blood        Plan:       1. Primary hypertension  Chronic stable  Continue medications same dose  Low-sodium diet  Check BP and keep log    Orders:  -     CBC Auto Differential; Future; Expected date: 12/20/2025  -     Comprehensive Metabolic Panel; Future; Expected date: 12/20/2025  -     Lipid Panel; Future; Expected date: 12/20/2025  -     Hemoglobin A1C; Future; Expected date: 12/20/2025  -     Uric Acid; Future; Expected date: 12/20/2025    2. Mixed hyperlipidemia  Chronic stable  Continue statin per Cardiology    Orders:  -     CBC Auto Differential; Future; Expected date: 12/20/2025  -     Comprehensive Metabolic Panel; Future; Expected date: 12/20/2025  -     Lipid Panel; Future; Expected date: 12/20/2025  -     Hemoglobin A1C; Future; Expected date: 12/20/2025  -     Uric Acid; Future; Expected date: 12/20/2025    3. Coronary artery disease involving native coronary artery of native heart without angina pectoris  Chronic stable  Continue aspirin statin per Cardiology  Overview:  Monitor for any chest pain  Hold ASA for now    Orders:  -     CBC Auto Differential; Future; Expected date: 12/20/2025  -     Comprehensive Metabolic Panel; Future; Expected date: 12/20/2025  -     Lipid Panel; Future; Expected date: 12/20/2025  -     Hemoglobin A1C; Future; Expected date: 12/20/2025  -     Uric Acid; Future; Expected date:  12/20/2025    4. Age-related osteoporosis without current pathological fracture  Chronic stable  Continue calcium and vitamin-D  -     CBC Auto Differential; Future; Expected date: 12/20/2025  -     Comprehensive Metabolic Panel; Future; Expected date: 12/20/2025  -     Lipid Panel; Future; Expected date: 12/20/2025  -     Hemoglobin A1C; Future; Expected date: 12/20/2025  -     Uric Acid; Future; Expected date: 12/20/2025  -     Vitamin D; Future; Expected date: 12/20/2025    5. ANA PAULA (generalized anxiety disorder)  Chronic stable  Continue Zoloft same dose    6. 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  She is taking indomethacin twice over the last 6 months  We will continue to monitor clinically and repeat her uric acid next visit  Given her age he is really not interested in adding allopurinol at this time.  -     CBC Auto Differential; Future; Expected date: 12/20/2025  -     Comprehensive Metabolic Panel; Future; Expected date: 12/20/2025  -     Lipid Panel; Future; Expected date: 12/20/2025  -     Hemoglobin A1C; Future; Expected date: 12/20/2025  -     Uric Acid; Future; Expected date: 12/20/2025       return to clinic 6 months with labs and PRN                [1]   Social History  Socioeconomic History    Marital status:    Tobacco Use    Smoking status: Never   Substance and Sexual Activity    Alcohol use: No    Drug use: No     Social Drivers of Health     Financial Resource Strain: Low Risk  (5/21/2024)    Overall Financial Resource Strain (CARDIA)     Difficulty of Paying Living Expenses: Not hard at all   Food Insecurity: No Food Insecurity (5/21/2024)    Hunger Vital Sign     Worried About Running Out of Food in the Last Year: Never true     Ran Out of Food in the Last Year: Never true   Transportation Needs: No Transportation Needs (5/21/2024)    TRANSPORTATION NEEDS     Transportation : No   Physical Activity: Sufficiently  Active (5/21/2024)    Exercise Vital Sign     Days of Exercise per Week: 7 days     Minutes of Exercise per Session: 60 min   Stress: No Stress Concern Present (5/21/2024)    [employer] - Occupational Stress Questionnaire     Feeling of Stress : Not at all   Housing Stability: Unknown (5/21/2024)    Housing Stability Vital Sign     Unable to Pay for Housing in the Last Year: No     Homeless in the Last Year: No

## (undated) DEVICE — DRAPE FLUID WARMER ORS 44X44IN

## (undated) DEVICE — SUT SILK 0 STRANDS 30IN BLK

## (undated) DEVICE — SUT SILK 2-0 STRANDS 30IN

## (undated) DEVICE — TRAY FOLEY 16FR INFECTION CONT

## (undated) DEVICE — GOWN POLY REINF BRTH SLV XL

## (undated) DEVICE — SUPPORT ULNA NERVE PROTECTOR

## (undated) DEVICE — STAPLER SKIN ROTATING HEAD

## (undated) DEVICE — COVER OVERHEAD SURG LT BLUE

## (undated) DEVICE — SUT VICRYL CTD 2-0 GI 27 SH

## (undated) DEVICE — SUT 2/0 30IN SILK BLK BRAI

## (undated) DEVICE — PACK SURGERY START

## (undated) DEVICE — GOWN POLY REINF BRTH SLV LG

## (undated) DEVICE — SUT CTD VICRYL 3-0 CR/SH

## (undated) DEVICE — APPLICATOR CHLORAPREP ORN 26ML

## (undated) DEVICE — GLOVE SURGICAL LATEX SZ 7

## (undated) DEVICE — SUT SILK 3-0 SH 18IN BLACK

## (undated) DEVICE — SUT 1 48IN PDS II VIO MONO

## (undated) DEVICE — MANIFOLD 4 PORT

## (undated) DEVICE — DRESSING XEROFORM NONADH 1X8IN

## (undated) DEVICE — SPONGE LAP 18X18 PREWASHED

## (undated) DEVICE — DRAPE LAP T SHT W/ INSTR PAD

## (undated) DEVICE — CONTAINERS 32OZ

## (undated) DEVICE — DRESSING TRANS 4X4 3/4

## (undated) DEVICE — SPONGE COTTON TRAY 4X4IN

## (undated) DEVICE — ELECTRODE REM PLYHSV RETURN 9

## (undated) DEVICE — DRESSING AQUACEL SACRAL 9 X 9